# Patient Record
Sex: FEMALE | Race: WHITE | NOT HISPANIC OR LATINO | Employment: OTHER | URBAN - METROPOLITAN AREA
[De-identification: names, ages, dates, MRNs, and addresses within clinical notes are randomized per-mention and may not be internally consistent; named-entity substitution may affect disease eponyms.]

---

## 2017-03-10 ENCOUNTER — HOSPITAL ENCOUNTER (OUTPATIENT)
Dept: LAB | Facility: MEDICAL CENTER | Age: 72
End: 2017-03-10
Attending: SPECIALIST
Payer: MEDICARE

## 2017-03-10 LAB
ALBUMIN SERPL BCP-MCNC: 4 G/DL (ref 3.2–4.9)
ALBUMIN/GLOB SERPL: 1.2 G/DL
ALP SERPL-CCNC: 63 U/L (ref 30–99)
ALT SERPL-CCNC: 27 U/L (ref 2–50)
ANION GAP SERPL CALC-SCNC: 6 MMOL/L (ref 0–11.9)
AST SERPL-CCNC: 16 U/L (ref 12–45)
BASOPHILS # BLD AUTO: 0.07 K/UL (ref 0–0.12)
BASOPHILS NFR BLD AUTO: 0.8 % (ref 0–1.8)
BILIRUB SERPL-MCNC: 0.5 MG/DL (ref 0.1–1.5)
BUN SERPL-MCNC: 10 MG/DL (ref 8–22)
CALCIUM SERPL-MCNC: 9.9 MG/DL (ref 8.5–10.5)
CHLORIDE SERPL-SCNC: 107 MMOL/L (ref 96–112)
CO2 SERPL-SCNC: 25 MMOL/L (ref 20–33)
CREAT SERPL-MCNC: 0.82 MG/DL (ref 0.5–1.4)
EOSINOPHIL # BLD: 0.22 K/UL (ref 0–0.51)
EOSINOPHIL NFR BLD AUTO: 2.4 % (ref 0–6.9)
ERYTHROCYTE [DISTWIDTH] IN BLOOD BY AUTOMATED COUNT: 55 FL (ref 35.9–50)
GLOBULIN SER CALC-MCNC: 3.3 G/DL (ref 1.9–3.5)
GLUCOSE SERPL-MCNC: 116 MG/DL (ref 65–99)
HCT VFR BLD AUTO: 40.1 % (ref 37–47)
HGB BLD-MCNC: 13.3 G/DL (ref 12–16)
IMM GRANULOCYTES # BLD AUTO: 0.06 K/UL (ref 0–0.11)
IMM GRANULOCYTES NFR BLD AUTO: 0.6 % (ref 0–0.9)
LYMPHOCYTES # BLD: 3.13 K/UL (ref 1–4.8)
LYMPHOCYTES NFR BLD AUTO: 33.8 % (ref 22–41)
MCH RBC QN AUTO: 32.8 PG (ref 27–33)
MCHC RBC AUTO-ENTMCNC: 33.2 G/DL (ref 33.6–35)
MCV RBC AUTO: 99 FL (ref 81.4–97.8)
MONOCYTES # BLD: 0.66 K/UL (ref 0–0.85)
MONOCYTES NFR BLD AUTO: 7.1 % (ref 0–13.4)
NEUTROPHILS # BLD: 5.12 K/UL (ref 2–7.15)
NEUTROPHILS NFR BLD AUTO: 55.3 % (ref 44–72)
NRBC # BLD AUTO: 0 K/UL
NRBC BLD-RTO: 0 /100 WBC
PLATELET # BLD AUTO: 284 K/UL (ref 164–446)
PMV BLD AUTO: 9.9 FL (ref 9–12.9)
POTASSIUM SERPL-SCNC: 4.1 MMOL/L (ref 3.6–5.5)
PROT SERPL-MCNC: 7.3 G/DL (ref 6–8.2)
RBC # BLD AUTO: 4.05 M/UL (ref 4.2–5.4)
SODIUM SERPL-SCNC: 138 MMOL/L (ref 135–145)
WBC # BLD AUTO: 9.3 K/UL (ref 4.8–10.8)

## 2017-03-10 PROCEDURE — 36415 COLL VENOUS BLD VENIPUNCTURE: CPT

## 2017-03-10 PROCEDURE — 85025 COMPLETE CBC W/AUTO DIFF WBC: CPT

## 2017-03-10 PROCEDURE — 80053 COMPREHEN METABOLIC PANEL: CPT

## 2017-03-22 ENCOUNTER — OFFICE VISIT (OUTPATIENT)
Dept: URGENT CARE | Facility: PHYSICIAN GROUP | Age: 72
End: 2017-03-22
Payer: MEDICARE

## 2017-03-22 VITALS
WEIGHT: 210 LBS | HEART RATE: 98 BPM | BODY MASS INDEX: 35.85 KG/M2 | RESPIRATION RATE: 16 BRPM | HEIGHT: 64 IN | TEMPERATURE: 98.6 F | SYSTOLIC BLOOD PRESSURE: 140 MMHG | OXYGEN SATURATION: 93 % | DIASTOLIC BLOOD PRESSURE: 70 MMHG

## 2017-03-22 DIAGNOSIS — H69.92 EUSTACHIAN TUBE DYSFUNCTION, LEFT: ICD-10-CM

## 2017-03-22 PROCEDURE — 4040F PNEUMOC VAC/ADMIN/RCVD: CPT | Mod: 8P | Performed by: PHYSICIAN ASSISTANT

## 2017-03-22 PROCEDURE — G8419 CALC BMI OUT NRM PARAM NOF/U: HCPCS | Performed by: PHYSICIAN ASSISTANT

## 2017-03-22 PROCEDURE — 4004F PT TOBACCO SCREEN RCVD TLK: CPT | Performed by: PHYSICIAN ASSISTANT

## 2017-03-22 PROCEDURE — 3014F SCREEN MAMMO DOC REV: CPT | Mod: 8P | Performed by: PHYSICIAN ASSISTANT

## 2017-03-22 PROCEDURE — G8432 DEP SCR NOT DOC, RNG: HCPCS | Performed by: PHYSICIAN ASSISTANT

## 2017-03-22 PROCEDURE — G8484 FLU IMMUNIZE NO ADMIN: HCPCS | Performed by: PHYSICIAN ASSISTANT

## 2017-03-22 PROCEDURE — 1101F PT FALLS ASSESS-DOCD LE1/YR: CPT | Mod: 8P | Performed by: PHYSICIAN ASSISTANT

## 2017-03-22 PROCEDURE — 3017F COLORECTAL CA SCREEN DOC REV: CPT | Mod: 8P | Performed by: PHYSICIAN ASSISTANT

## 2017-03-22 PROCEDURE — 99214 OFFICE O/P EST MOD 30 MIN: CPT | Performed by: PHYSICIAN ASSISTANT

## 2017-03-22 RX ORDER — PREDNISONE 20 MG/1
TABLET ORAL
Qty: 10 TAB | Refills: 0 | Status: SHIPPED | OUTPATIENT
Start: 2017-03-22 | End: 2018-08-13

## 2017-03-22 ASSESSMENT — ENCOUNTER SYMPTOMS
RHINORRHEA: 0
COUGH: 0
NECK PAIN: 0
HEADACHES: 0
DIARRHEA: 0
SORE THROAT: 0

## 2017-03-22 NOTE — PROGRESS NOTES
"Subjective:      Angela Winchester is a 71 y.o. female who presents with Ear Pain            Otalgia   There is pain in the left ear. This is a new problem. The current episode started yesterday. The problem occurs constantly. The problem has been unchanged. There has been no fever. The fever has been present for less than 1 day. The pain is at a severity of 4/10. The pain is mild. Pertinent negatives include no coughing, diarrhea, ear discharge, headaches, hearing loss, neck pain, rash, rhinorrhea or sore throat.   had bronchitis and sinusitis in 11/2016. Denies ringing in ear. Feels \"full\".     Review of Systems   HENT: Positive for ear pain. Negative for ear discharge, hearing loss, rhinorrhea and sore throat.    Respiratory: Negative for cough.    Gastrointestinal: Negative for diarrhea.   Musculoskeletal: Negative for neck pain.   Skin: Negative for rash.   Neurological: Negative for headaches.          Objective:     /70 mmHg  Pulse 98  Temp(Src) 37 °C (98.6 °F)  Resp 16  Ht 1.626 m (5' 4.02\")  Wt 95.255 kg (210 lb)  BMI 36.03 kg/m2  SpO2 93%     Physical Exam   Constitutional: She appears well-developed.   HENT:   Head: Normocephalic.   Right Ear: External ear normal.   Left Ear: External ear normal. No drainage, swelling or tenderness. No foreign bodies. Tympanic membrane is not injected, not perforated, not erythematous and not bulging. Tympanic membrane mobility is abnormal.  No middle ear effusion. No decreased hearing is noted.   Eyes: EOM are normal. Pupils are equal, round, and reactive to light.   Neck: Normal range of motion.   Cardiovascular: Normal rate, regular rhythm and normal heart sounds.    Pulmonary/Chest: Effort normal and breath sounds normal. No respiratory distress. She has no wheezes. She has no rales.   Abdominal: Soft.   Skin: Skin is warm and dry.   Psychiatric: She has a normal mood and affect.               Assessment/Plan:     1. Eustachian tube dysfunction, " left  -take claritan-D daily  -use your fluticasone nasal spray once daily.  -no signs of otitis media. Discussed at length. Follow up as needed  - predniSONE (DELTASONE) 20 MG Tab; Take one pill twice a day for five days  Dispense: 10 Tab; Refill: 0

## 2017-03-22 NOTE — MR AVS SNAPSHOT
"        Angela Winchester   3/22/2017 8:00 AM   Office Visit   MRN: [id number]    Department:  [facility]   Dept Phone:  [phone]    Description:  Female : 1945   Provider:  Leslie Jones PA-C           Reason for Visit     Ear Pain C/o of left side ear and sinus pain, sinus headache, cough, reduced hearing in LT ear x1 day      Allergies as of 3/22/2017     No Known Allergies      You were diagnosed with     Eustachian tube dysfunction, left   [047680]         Vital Signs     Blood Pressure Pulse Temperature Respirations Height Weight    140/70 mmHg 98 37 °C (98.6 °F) 16 1.626 m (5' 4.02\") 95.255 kg (210 lb)    Body Mass Index Oxygen Saturation Smoking Status             36.03 kg/m2 93% Current Every Day Smoker         Basic Information     Date Of Birth Sex Race Ethnicity Preferred Language    1945 Female White Non- English      Health Maintenance        Date Due Completion Dates    IMM DTaP/Tdap/Td Vaccine (1 - Tdap) 1966 ---    PAP SMEAR 1968 ---    MAMMOGRAM 1987 ---    COLONOSCOPY 1997 ---    IMM ZOSTER VACCINE 2007 ---    IMM PNEUMOCOCCAL 65+ (ADULT) LOW/MEDIUM RISK SERIES (1 of 2 - PCV13) 2012 ---    BONE DENSITY 2015    IMM INFLUENZA (1) 2016 ---            Current Immunizations     No immunizations on file.      Below and/or attached are the medications your provider expects you to take. Review all of your home medications and newly ordered medications with your provider and/or pharmacist. Follow medication instructions as directed by your provider and/or pharmacist. Please keep your medication list with you and share with your provider. Update the information when medications are discontinued, doses are changed, or new medications (including over-the-counter products) are added; and carry medication information at all times in the event of emergency situations     Allergies:  No Known Allergies          Medications  Valid as " of: March 22, 2017 -  9:15 AM    Generic Name Brand Name Tablet Size Instructions for use    Adalimumab (Kit) HUMIRA 40 MG/0.8ML Inject 40 mg as instructed every 7 days.        Albuterol   Inhale 2 Puffs by mouth as needed.        ALPRAZolam (Tab) XANAX 0.5 MG Take 0.5 mg by mouth at bedtime as needed for Sleep.        AmLODIPine Besylate (Tab) NORVASC 5 MG Take 5 mg by mouth every day.        Amoxicillin (Cap) AMOXIL 500 MG Take 500 mg by mouth 3 times a day.        Atorvastatin Calcium (Tab) LIPITOR 20 MG Take 20 mg by mouth every evening.        Doxycycline Hyclate (Tab) VIBRAMYCIN 100 MG Take 1 Tab by mouth 2 times a day.        Doxycycline Hyclate (Tab) VIBRAMYCIN 100 MG Take 1 Tab by mouth 2 times a day.        Ergocalciferol (Cap) VITAMIN D2 46334 UNIT Take 50,000 Units by mouth every day.        Flunisolide (Solution) NASAREL 29 MCG/ACT (0.025%) Spray 2 Sprays in nose 2 Times a Day.        Folic Acid (Tab) FOLVITE 1 MG Take 1 mg by mouth every day.        GuaiFENesin   Take  by mouth.        Guaifenesin-Codeine (Solution) ROBITUSSIN -10 mg/5mL Take 5 mL by mouth every 6 hours as needed for Cough.        Hydrocodone-Acetaminophen (Tab) MAXIDONE  MG Take 1-2 Tabs by mouth every 6 hours as needed for Mild Pain.        Methotrexate Sodium (Tab) methotrexate 2.5 MG Take 2.5 mg by mouth every 7 days.        PredniSONE (Tab) DELTASONE 10 MG Take 10 mg by mouth every day. Take with food         PredniSONE (Tab) DELTASONE 20 MG Take one pill twice a day for five days        Tiotropium Bromide Monohydrate   Inhale 1 Tab by mouth every day.        Valsartan (Tab) DIOVAN 80 MG Take 320 mg by mouth every day.        .                 Medicines prescribed today were sent to:     [id number] [place] - [address] [facility]    [address] [place] [address]    Phone: [phone] Fax: [phone]    Open 24 Hours?: No      Medication refill instructions:       If your prescription bottle  indicates you have medication refills left, it is not necessary to call your provider’s office. Please contact your pharmacy and they will refill your medication.    If your prescription bottle indicates you do not have any refills left, you may request refills at any time through one of the following ways: The online Resilience system (except Urgent Care), by calling your provider’s office, or by asking your pharmacy to contact your provider’s office with a refill request. Medication refills are processed only during regular business hours and may not be available until the next business day. Your provider may request additional information or to have a follow-up visit with you prior to refilling your medication.   *Please Note: Medication refills are assigned a new Rx number when refilled electronically. Your pharmacy may indicate that no refills were authorized even though a new prescription for the same medication is available at the pharmacy. Please request the medicine by name with the pharmacy before contacting your provider for a refill.        Instructions    Take claritan-d daily as well as needed          Resilience Access Code: [email or web address]  Expires: 4/21/2017  9:15 AM    Resilience  A secure, online tool to manage your health information     [email or web address]’s Resilience® is a secure, online tool that connects you to your personalized health information from the privacy of your home -- day or night - making it very easy for you to manage your healthcare. Once the activation process is completed, you can even access your medical information using the Resilience daniela, which is available for free in the Apple Daniela store or Google Play store.     Resilience provides the following levels of access (as shown below):   My Chart Features   [facility] Doctor [facility]  Urgent  Care Non-[facility]  Primary Care  Doctor   Email your healthcare team securely and privately 24/7 X X X    Manage appointments:  schedule your next appointment; view details of past/upcoming appointments X      Request prescription refills. X      View recent personal medical records, including lab and immunizations X X X X   View health record, including health history, allergies, medications X X X X   Read reports about your outpatient visits, procedures, consult and ER notes X X X X   See your discharge summary, which is a recap of your hospital and/or ER visit that includes your diagnosis, lab results, and care plan. X X       How to register for [facility]:  1. Go to  [email or web address].  2. Click on the Sign Up Now box, which takes you to the New Member Sign Up page. You will need to provide the following information:  a. Enter your [facility] Access Code exactly as it appears at the top of this page. (You will not need to use this code after you’ve completed the sign-up process. If you do not sign up before the expiration date, you must request a new code.)   b. Enter your date of birth.   c. Enter your home email address.   d. Click Submit, and follow the next screen’s instructions.  3. Create a [facility]ara ID. This will be your [facility]ara login ID and cannot be changed, so think of one that is secure and easy to remember.  4. Create a [facility] password. You can change your password at any time.  5. Enter your Password Reset Question and Answer. This can be used at a later time if you forget your password.   6. Enter your e-mail address. This allows you to receive e-mail notifications when new information is available in [facility].  7. Click Sign Up. You can now view your health information.    For assistance activating your [facility] account, call [phone]        Quit Tobacco Information     Do you want to quit using tobacco?    Quitting tobacco decreases risks of cancer, heart and lung disease, increases life expectancy, improves sense of taste and smell, and increases spending money, among other benefits.    If you are thinking about  quitting, we can help.  • [facility] Quit Tobacco Program: 689-240-3562  o Program occurs weekly for four weeks and includes pharmacist consultation on products to support quitting smoking or chewing tobacco. A provider referral is needed for pharmacist consultation.  • Tobacco Users Help Hotline: 2-800-QUIT-NOW (742-9262) or https://[place].quitlogix.org/  o Free, confidential telephone and online coaching for [place] residents. Sessions are designed on a schedule that is convenient for you. Eligible clients receive free nicotine replacement therapy.  • Nationally: www.smokefree.gov  o Information and professional assistance to support both immediate and long-term needs as you become, and remain, a non-smoker. Smokefree.gov allows you to choose the help that best fits your needs.

## 2017-06-06 ENCOUNTER — HOSPITAL ENCOUNTER (OUTPATIENT)
Dept: LAB | Facility: MEDICAL CENTER | Age: 72
End: 2017-06-06
Attending: NURSE PRACTITIONER
Payer: MEDICARE

## 2017-06-06 LAB
ALBUMIN SERPL BCP-MCNC: 4 G/DL (ref 3.2–4.9)
ALBUMIN/GLOB SERPL: 1.1 G/DL
ALP SERPL-CCNC: 59 U/L (ref 30–99)
ALT SERPL-CCNC: 21 U/L (ref 2–50)
ANION GAP SERPL CALC-SCNC: 10 MMOL/L (ref 0–11.9)
AST SERPL-CCNC: 15 U/L (ref 12–45)
BASOPHILS # BLD AUTO: 0.8 % (ref 0–1.8)
BASOPHILS # BLD: 0.08 K/UL (ref 0–0.12)
BILIRUB SERPL-MCNC: 0.8 MG/DL (ref 0.1–1.5)
BUN SERPL-MCNC: 12 MG/DL (ref 8–22)
CALCIUM SERPL-MCNC: 9.5 MG/DL (ref 8.5–10.5)
CHLORIDE SERPL-SCNC: 100 MMOL/L (ref 96–112)
CO2 SERPL-SCNC: 24 MMOL/L (ref 20–33)
CREAT SERPL-MCNC: 0.83 MG/DL (ref 0.5–1.4)
EOSINOPHIL # BLD AUTO: 0.12 K/UL (ref 0–0.51)
EOSINOPHIL NFR BLD: 1.2 % (ref 0–6.9)
ERYTHROCYTE [DISTWIDTH] IN BLOOD BY AUTOMATED COUNT: 52.6 FL (ref 35.9–50)
GFR SERPL CREATININE-BSD FRML MDRD: >60 ML/MIN/1.73 M 2
GLOBULIN SER CALC-MCNC: 3.6 G/DL (ref 1.9–3.5)
GLUCOSE SERPL-MCNC: 131 MG/DL (ref 65–99)
HCT VFR BLD AUTO: 38.6 % (ref 37–47)
HGB BLD-MCNC: 13 G/DL (ref 12–16)
IMM GRANULOCYTES # BLD AUTO: 0.05 K/UL (ref 0–0.11)
IMM GRANULOCYTES NFR BLD AUTO: 0.5 % (ref 0–0.9)
LYMPHOCYTES # BLD AUTO: 1.61 K/UL (ref 1–4.8)
LYMPHOCYTES NFR BLD: 15.7 % (ref 22–41)
MCH RBC QN AUTO: 33.4 PG (ref 27–33)
MCHC RBC AUTO-ENTMCNC: 33.7 G/DL (ref 33.6–35)
MCV RBC AUTO: 99.2 FL (ref 81.4–97.8)
MONOCYTES # BLD AUTO: 0.64 K/UL (ref 0–0.85)
MONOCYTES NFR BLD AUTO: 6.2 % (ref 0–13.4)
NEUTROPHILS # BLD AUTO: 7.77 K/UL (ref 2–7.15)
NEUTROPHILS NFR BLD: 75.6 % (ref 44–72)
NRBC # BLD AUTO: 0 K/UL
NRBC BLD AUTO-RTO: 0 /100 WBC
PLATELET # BLD AUTO: 284 K/UL (ref 164–446)
PMV BLD AUTO: 9.9 FL (ref 9–12.9)
POTASSIUM SERPL-SCNC: 4 MMOL/L (ref 3.6–5.5)
PROT SERPL-MCNC: 7.6 G/DL (ref 6–8.2)
RBC # BLD AUTO: 3.89 M/UL (ref 4.2–5.4)
SODIUM SERPL-SCNC: 134 MMOL/L (ref 135–145)
WBC # BLD AUTO: 10.3 K/UL (ref 4.8–10.8)

## 2017-06-06 PROCEDURE — 85025 COMPLETE CBC W/AUTO DIFF WBC: CPT

## 2017-06-06 PROCEDURE — 36415 COLL VENOUS BLD VENIPUNCTURE: CPT

## 2017-06-06 PROCEDURE — 80053 COMPREHEN METABOLIC PANEL: CPT

## 2017-08-01 ENCOUNTER — APPOINTMENT (OUTPATIENT)
Dept: RADIOLOGY | Facility: IMAGING CENTER | Age: 72
End: 2017-08-01
Attending: PHYSICIAN ASSISTANT
Payer: MEDICARE

## 2017-08-01 ENCOUNTER — OFFICE VISIT (OUTPATIENT)
Dept: URGENT CARE | Facility: PHYSICIAN GROUP | Age: 72
End: 2017-08-01
Payer: MEDICARE

## 2017-08-01 VITALS
SYSTOLIC BLOOD PRESSURE: 124 MMHG | RESPIRATION RATE: 16 BRPM | BODY MASS INDEX: 35.85 KG/M2 | DIASTOLIC BLOOD PRESSURE: 84 MMHG | TEMPERATURE: 99.3 F | WEIGHT: 210 LBS | OXYGEN SATURATION: 94 % | HEART RATE: 85 BPM | HEIGHT: 64 IN

## 2017-08-01 DIAGNOSIS — Z72.0 TOBACCO ABUSE: ICD-10-CM

## 2017-08-01 DIAGNOSIS — M79.89 SWELLING OF RIGHT HAND: ICD-10-CM

## 2017-08-01 PROCEDURE — L3999 UPPER LIMB ORTHOSIS NOS: HCPCS | Performed by: PHYSICIAN ASSISTANT

## 2017-08-01 PROCEDURE — 73130 X-RAY EXAM OF HAND: CPT | Mod: TC,RT | Performed by: PHYSICIAN ASSISTANT

## 2017-08-01 PROCEDURE — 99214 OFFICE O/P EST MOD 30 MIN: CPT | Mod: 25 | Performed by: PHYSICIAN ASSISTANT

## 2017-08-01 PROCEDURE — 99406 BEHAV CHNG SMOKING 3-10 MIN: CPT | Performed by: PHYSICIAN ASSISTANT

## 2017-08-01 RX ORDER — NAPROXEN 375 MG/1
375 TABLET ORAL 2 TIMES DAILY WITH MEALS
Qty: 28 TAB | Refills: 0 | Status: SHIPPED | OUTPATIENT
Start: 2017-08-01 | End: 2017-08-15

## 2017-08-01 ASSESSMENT — ENCOUNTER SYMPTOMS
MYALGIAS: 0
VOMITING: 0
ABDOMINAL PAIN: 0
DIARRHEA: 0
NAUSEA: 0
SHORTNESS OF BREATH: 0
HEADACHES: 0
FEVER: 0
DIZZINESS: 0
CHILLS: 0

## 2017-08-01 NOTE — PATIENT INSTRUCTIONS
De Quervain Disease  De Quervain disease is inflammation of the tendon on the thumb side of the wrist. Tendons are cords of tissue that connect bones to muscles. The tendons in your hand pass through a tunnel, or sheath. A slippery layer of tissue (synovium) lets the tendons move smoothly in the sheath. With de Quervain disease, the sheath swells or thickens, causing friction and pain.  The condition is also called de Quervain tendinosis and de Quervain syndrome. It occurs most often in women who are 30-50 years old.  CAUSES   The exact cause of de Quervain disease is not known. It may result from:   · Overusing your hands, especially with repetitive motions that involve twisting your hand or using a forceful .  · Pregnancy.  · Rheumatoid disease.  RISK FACTORS  You may have a greater risk for de Quervain disease if you:  · Are a middle-aged woman.  · Are pregnant.  · Have rheumatoid arthritis.  · Have diabetes.  · Use your hands far more than normal, especially with a tight  or excessive twisting.  SIGNS AND SYMPTOMS  Pain on the thumb side of your wrist is the main symptom of de Quervain disease. Other signs and symptoms include:  · Pain that gets worse when you grasp something or turn your wrist.  · Pain that extends up the forearm.  · Cysts in the area of the pain.  · Swelling of your wrist and hand.  · A sensation of snapping in the wrist.  · Trouble moving the thumb and wrist.  DIAGNOSIS   Your health care provider may diagnose de Quervain disease based on your signs and symptoms. A physical exam will also be done. A simple test (Finkelstein test) that involves pulling your thumb and wrist to see if this causes pain can help determine whether you have the condition. Sometimes you may need to have an X-ray.   TREATMENT   Avoiding any activity that causes pain and swelling is the best treatment. Other options include:  · Wearing a splint.  · Taking medicine. Anti-inflammatory medicines and corticosteroid  injections may reduce inflammation and relieve pain.  · Having surgery if other treatments do not work.  HOME CARE INSTRUCTIONS   · Using ice can be helpful after doing activities that involve the sore wrist. To apply ice to the injured area:  ¨ Put ice in a plastic bag.  ¨ Place a towel between your skin and the bag.  ¨ Leave the ice on for 20 minutes, 2-3 times a day.  · Take medicines only as directed by your health care provider.  · Wear your splint as directed. This will allow your hand to rest and heal.  SEEK MEDICAL CARE IF:   · Your pain medicine does not help.    · Your pain gets worse.  · You develop new symptoms.  MAKE SURE YOU:   · Understand these instructions.  · Will watch your condition.  · Will get help right away if you are not doing well or get worse.     This information is not intended to replace advice given to you by your health care provider. Make sure you discuss any questions you have with your health care provider.     Document Released: 09/12/2002 Document Revised: 01/08/2016 Document Reviewed: 04/22/2015  Elsevier Interactive Patient Education ©2016 Elsevier Inc.

## 2017-08-01 NOTE — PROGRESS NOTES
"Subjective:      Angela Winchester is a 71 y.o. female who presents with Wrist Pain            HPI   Patient presents to urgent care reporting right wrist/hand pain x 3 days. She is right handed. She denies any known injury or trauma that provoked the pain. PMH includes rheumatoid arthritis, currently on methotrexate and humira with Norco  mg for break through pain. States he has taken the Norco for this problem without any relief. Pain is mostly localized over thumb and it radiates over dorsal aspect of hand. Reports slight swelling to the hand. She is concerned about a possible infection because she scratched her hand about a month ago. States the scratch was with a piece of metal and was very superficial. She cleaned it out afterwards and it never became red or swollen. She is not UTD on tetanus vaccinations, states they make her sick so she avoids them. Surgical history includes carpal tunnel release on right hand. Denies fevers, chills, nausea, vomiting, or body aches.     Review of Systems   Constitutional: Negative for fever and chills.   Respiratory: Negative for shortness of breath.    Cardiovascular: Negative for chest pain.   Gastrointestinal: Negative for nausea, vomiting, abdominal pain and diarrhea.   Genitourinary: Negative.    Musculoskeletal: Negative for myalgias.        Positive for hand pain   Neurological: Negative for dizziness and headaches.          Objective:     /84 mmHg  Pulse 85  Temp(Src) 37.4 °C (99.3 °F)  Resp 16  Ht 1.626 m (5' 4\")  Wt 95.255 kg (210 lb)  BMI 36.03 kg/m2  SpO2 94%     Physical Exam   Constitutional: She is oriented to person, place, and time. She appears well-developed and well-nourished. No distress.   HENT:   Head: Normocephalic and atraumatic.   Eyes: Pupils are equal, round, and reactive to light.   Neck: Normal range of motion.   Cardiovascular: Normal rate.    Pulmonary/Chest: Effort normal.   Musculoskeletal:        Right hand: She exhibits " decreased range of motion, tenderness and bony tenderness. Normal sensation noted. Decreased strength noted. She exhibits thumb/finger opposition and wrist extension trouble.        Hands:  Slight, diffuse swelling noted over right hand. No overlying erythema or warmth to touch. Patient especially tender over radial aspect of thumb. + Finkelstein test.    Neurological: She is alert and oriented to person, place, and time.   Skin: Skin is warm and dry. She is not diaphoretic.   Psychiatric: She has a normal mood and affect. Her behavior is normal.   Nursing note and vitals reviewed.         PMH:  has a past medical history of Arthritis; Hypertension; Sleep apnea; Snoring; Pain; Cancer (CMS-HCC) (2009); and RA (rheumatoid arthritis) (CMS-HCC).  MEDS:   Current outpatient prescriptions:   •  LOSARTAN POTASSIUM PO, Take  by mouth., Disp: , Rfl:   •  AMLODIPINE-ATORVASTATIN PO, Take  by mouth., Disp: , Rfl:   •  flunisolide, NASAL, (NASAREL) 29 MCG/ACT (0.025%) SOLN, Spray 2 Sprays in nose 2 Times a Day., Disp: 1 Bottle, Rfl: 0  •  amlodipine (NORVASC) 5 MG TABS, Take 5 mg by mouth every day., Disp: , Rfl:   •  Tiotropium Bromide Monohydrate (SPIRIVA HANDIHALER INH), Inhale 1 Tab by mouth every day., Disp: , Rfl:   •  alprazolam (XANAX) 0.5 MG TABS, Take 0.5 mg by mouth at bedtime as needed for Sleep., Disp: , Rfl:   •  atorvastatin (LIPITOR) 20 MG TABS, Take 20 mg by mouth every evening., Disp: , Rfl:   •  methotrexate (TREXALL) 2.5 MG TABS, Take 2.5 mg by mouth every 7 days., Disp: , Rfl:   •  folic acid (FOLVITE) 1 MG TABS, Take 1 mg by mouth every day., Disp: , Rfl:   •  hydrocodone-acetaminophen (MAXIDONE)  MG per tablet, Take 1-2 Tabs by mouth every 6 hours as needed for Mild Pain., Disp: , Rfl:   •  ergocalciferol (VITAMIN D2) 81184 UNIT CAPS, Take 50,000 Units by mouth every day., Disp: , Rfl:   •  adalimumab (HUMIRA) 40 MG/0.8ML injection, Inject 40 mg as instructed every 7 days., Disp: , Rfl:   •   GuaiFENesin (MUCINEX PO), Take  by mouth., Disp: , Rfl:   •  predniSONE (DELTASONE) 20 MG Tab, Take one pill twice a day for five days, Disp: 10 Tab, Rfl: 0  •  guaifenesin-codeine (CHERATUSSIN AC) Solution oral solution, Take 5 mL by mouth every 6 hours as needed for Cough., Disp: 90 mL, Rfl: 0  •  doxycycline (VIBRAMYCIN) 100 MG Tab, Take 1 Tab by mouth 2 times a day., Disp: 20 Tab, Rfl: 0  •  doxycycline (VIBRAMYCIN) 100 MG TABS, Take 1 Tab by mouth 2 times a day., Disp: 20 Tab, Rfl: 0  •  valsartan (DIOVAN) 80 MG TABS, Take 320 mg by mouth every day., Disp: , Rfl:   •  amoxicillin (AMOXIL) 500 MG CAPS, Take 500 mg by mouth 3 times a day., Disp: , Rfl:   •  predniSONE (DELTASONE) 10 MG TABS, Take 10 mg by mouth every day. Take with food , Disp: , Rfl:   •  Albuterol (VENTOLIN INH), Inhale 2 Puffs by mouth as needed., Disp: , Rfl:   ALLERGIES: No Known Allergies  SURGHX:   Past Surgical History   Procedure Laterality Date   • Thoracoscopy  6/8/2009     Performed by GANSER, JOHN H at SURGERY [facility] ORS   • Carpal tunnel endoscopic  2/5/2010     Performed by EREN KOVACS at SURGERY SAME DAY [facility] ORS   • Cataract phaco with iol  6/17/2013     Performed by Tyron Quiles M.D. at SURGERY [facility] ORS   • Cataract phaco with iol  7/1/2013     Performed by Tyron Quiles M.D. at SURGERY [facility]     SOCHX:  reports that she has been smoking Cigarettes.  She has a 10.5 pack-year smoking history. She does not have any smokeless tobacco history on file. She reports that she does not drink alcohol or use illicit drugs.  FH:      Assessment/Plan:     1. Swelling of right hand  - DX-HAND 3+ RIGHT; Future  Impression        Osteoarthritis affecting the interphalangeal joints with joint space narrowing, subchondral sclerosis and osteophyte formation.    No fracture or acute bony abnormality.         Informed patient of x-ray results. Advised her symptoms are consistent with DeQuervain's  tenosynovitis. Thumb spica splint given to patient in clinic. Short course of Naproxen 375 mg BID x 1-2 weeks. Handout given. Encouraged frequent icing. Patient has appointment with her PCP in 6 days and will follow up then.     2. Tobacco abuse    Tobacco cessation counseling was provided to the patient for a duration of three to seven minutes.  Tobacco effects, benefits of cessation and methods to use to achieve this goal were briefly discussed. An information handout was offered.

## 2017-08-01 NOTE — MR AVS SNAPSHOT
"        Angela Winchester   2017 8:45 AM   Office Visit   MRN: [id number]    Department:  [facility]   Dept Phone:  [phone]    Description:  Female : 1945   Provider:  Janet Rosado PA-C           Reason for Visit     Wrist Pain R. wrist and hand pain X 3 days Pt. cut their R. hand a month ago on metal and did not get a TDAP after.       Allergies as of 2017     No Known Allergies      You were diagnosed with     Swelling of right hand   [2094541]       Tobacco abuse   [[id number]]         Vital Signs     Blood Pressure Pulse Temperature Respirations Height Weight    124/84 mmHg 85 37.4 °C (99.3 °F) 16 1.626 m (5' 4\") 95.255 kg (210 lb)    Body Mass Index Oxygen Saturation Smoking Status             36.03 kg/m2 94% Current Every Day Smoker         Basic Information     Date Of Birth Sex Race Ethnicity Preferred Language    1945 Female White Non- English      Health Maintenance        Date Due Completion Dates    IMM DTaP/Tdap/Td Vaccine (1 - Tdap) 1966 ---    PAP SMEAR 1968 ---    MAMMOGRAM 1987 ---    COLONOSCOPY 1997 ---    IMM ZOSTER VACCINE 2007 ---    IMM PNEUMOCOCCAL 65+ (ADULT) LOW/MEDIUM RISK SERIES (1 of 2 - PCV13) 2012 ---    BONE DENSITY 2015    IMM INFLUENZA (1) 2017 ---            Current Immunizations     No immunizations on file.      Below and/or attached are the medications your provider expects you to take. Review all of your home medications and newly ordered medications with your provider and/or pharmacist. Follow medication instructions as directed by your provider and/or pharmacist. Please keep your medication list with you and share with your provider. Update the information when medications are discontinued, doses are changed, or new medications (including over-the-counter products) are added; and carry medication information at all times in the event of emergency situations     Allergies:  No Known " Allergies          Medications  Valid as of: August 01, 2017 - 11:01 AM    Generic Name Brand Name Tablet Size Instructions for use    Adalimumab (Kit) HUMIRA 40 MG/0.8ML Inject 40 mg as instructed every 7 days.        Albuterol   Inhale 2 Puffs by mouth as needed.        ALPRAZolam (Tab) XANAX 0.5 MG Take 0.5 mg by mouth at bedtime as needed for Sleep.        AmLODIPine Besylate (Tab) NORVASC 5 MG Take 5 mg by mouth every day.        Amlodipine-Atorvastatin   Take  by mouth.        Amoxicillin (Cap) AMOXIL 500 MG Take 500 mg by mouth 3 times a day.        Atorvastatin Calcium (Tab) LIPITOR 20 MG Take 20 mg by mouth every evening.        Doxycycline Hyclate (Tab) VIBRAMYCIN 100 MG Take 1 Tab by mouth 2 times a day.        Doxycycline Hyclate (Tab) VIBRAMYCIN 100 MG Take 1 Tab by mouth 2 times a day.        Ergocalciferol (Cap) VITAMIN D2 45763 UNIT Take 50,000 Units by mouth every day.        Flunisolide (Solution) NASAREL 29 MCG/ACT (0.025%) Spray 2 Sprays in nose 2 Times a Day.        Folic Acid (Tab) FOLVITE 1 MG Take 1 mg by mouth every day.        GuaiFENesin   Take  by mouth.        Guaifenesin-Codeine (Solution) ROBITUSSIN -10 mg/5mL Take 5 mL by mouth every 6 hours as needed for Cough.        Hydrocodone-Acetaminophen (Tab) MAXIDONE  MG Take 1-2 Tabs by mouth every 6 hours as needed for Mild Pain.        Losartan Potassium   Take  by mouth.        Methotrexate Sodium (Tab) methotrexate 2.5 MG Take 2.5 mg by mouth every 7 days.        Naproxen (Tab) NAPROSYN 375 MG Take 1 Tab by mouth 2 times a day, with meals for 14 days.        PredniSONE (Tab) DELTASONE 10 MG Take 10 mg by mouth every day. Take with food         PredniSONE (Tab) DELTASONE 20 MG Take one pill twice a day for five days        Tiotropium Bromide Monohydrate   Inhale 1 Tab by mouth every day.        Valsartan (Tab) DIOVAN 80 MG Take 320 mg by mouth every day.        .                 Medicines prescribed today were sent to:      [id number] - [place], [place] - [address] [facility]    [address] [facility] [place] [address]    Phone: [phone] Fax: [phone]    Open 24 Hours?: No      Medication refill instructions:       If your prescription bottle indicates you have medication refills left, it is not necessary to call your provider’s office. Please contact your pharmacy and they will refill your medication.    If your prescription bottle indicates you do not have any refills left, you may request refills at any time through one of the following ways: The online [employer] system (except Urgent Care), by calling your provider’s office, or by asking your pharmacy to contact your provider’s office with a refill request. Medication refills are processed only during regular business hours and may not be available until the next business day. Your provider may request additional information or to have a follow-up visit with you prior to refilling your medication.   *Please Note: Medication refills are assigned a new Rx number when refilled electronically. Your pharmacy may indicate that no refills were authorized even though a new prescription for the same medication is available at the pharmacy. Please request the medicine by name with the pharmacy before contacting your provider for a refill.        Your To Do List     Future Labs/Procedures Complete By Expires    DX-HAND 3+ RIGHT  As directed 8/1/2018      Instructions    De Quervain Disease  De Quervain disease is inflammation of the tendon on the thumb side of the wrist. Tendons are cords of tissue that connect bones to muscles. The tendons in your hand pass through a tunnel, or sheath. A slippery layer of tissue (synovium) lets the tendons move smoothly in the sheath. With de Quervain disease, the sheath swells or thickens, causing friction and pain.  The condition is also called de Quervain tendinosis and de Quervain syndrome. It occurs most often in women who are 30-50 years  old.  CAUSES   The exact cause of de Quervain disease is not known. It may result from:   · Overusing your hands, especially with repetitive motions that involve twisting your hand or using a forceful .  · Pregnancy.  · Rheumatoid disease.  RISK FACTORS  You may have a greater risk for de Quervain disease if you:  · Are a middle-aged woman.  · Are pregnant.  · Have rheumatoid arthritis.  · Have diabetes.  · Use your hands far more than normal, especially with a tight  or excessive twisting.  SIGNS AND SYMPTOMS  Pain on the thumb side of your wrist is the main symptom of de Quervain disease. Other signs and symptoms include:  · Pain that gets worse when you grasp something or turn your wrist.  · Pain that extends up the forearm.  · Cysts in the area of the pain.  · Swelling of your wrist and hand.  · A sensation of snapping in the wrist.  · Trouble moving the thumb and wrist.  DIAGNOSIS   Your health care provider may diagnose de Quervain disease based on your signs and symptoms. A physical exam will also be done. A simple test (Finkelstein test) that involves pulling your thumb and wrist to see if this causes pain can help determine whether you have the condition. Sometimes you may need to have an X-ray.   TREATMENT   Avoiding any activity that causes pain and swelling is the best treatment. Other options include:  · Wearing a splint.  · Taking medicine. Anti-inflammatory medicines and corticosteroid injections may reduce inflammation and relieve pain.  · Having surgery if other treatments do not work.  HOME CARE INSTRUCTIONS   · Using ice can be helpful after doing activities that involve the sore wrist. To apply ice to the injured area:  ¨ Put ice in a plastic bag.  ¨ Place a towel between your skin and the bag.  ¨ Leave the ice on for 20 minutes, 2-3 times a day.  · Take medicines only as directed by your health care provider.  · Wear your splint as directed. This will allow your hand to rest and  heal.  SEEK MEDICAL CARE IF:   · Your pain medicine does not help.    · Your pain gets worse.  · You develop new symptoms.  MAKE SURE YOU:   · Understand these instructions.  · Will watch your condition.  · Will get help right away if you are not doing well or get worse.     This information is not intended to replace advice given to you by your health care provider. Make sure you discuss any questions you have with your health care provider.     Document Released: 09/12/2002 Document Revised: 01/08/2016 Document Reviewed: 04/22/2015  [employer] Interactive Patient Education ©2016 [employer]vier Inc.            MedAware [email or web address] Access Code: Activation code not generated  Current MedAware [email or web address] Status: Active          Quit Tobacco Information     Do you want to quit using tobacco?    Quitting tobacco decreases risks of cancer, heart and lung disease, increases life expectancy, improves sense of taste and smell, and increases spending money, among other benefits.    If you are thinking about quitting, we can help.  • [facility] Quit Tobacco Program: 343-039-7897  o Program occurs weekly for four weeks and includes pharmacist consultation on products to support quitting smoking or chewing tobacco. A provider referral is needed for pharmacist consultation.  • Tobacco Users Help Hotline: 9-175-QUIT-NOW (053-8055) or https://[place].quitlogix.org/  o Free, confidential telephone and online coaching for [place] residents. Sessions are designed on a schedule that is convenient for you. Eligible clients receive free nicotine replacement therapy.  • Nationally: www.smokefree.gov  o Information and professional assistance to support both immediate and long-term needs as you become, and remain, a non-smoker. Smokefree.gov allows you to choose the help that best fits your needs.

## 2017-09-13 ENCOUNTER — HOSPITAL ENCOUNTER (OUTPATIENT)
Dept: LAB | Facility: MEDICAL CENTER | Age: 72
End: 2017-09-13
Attending: NURSE PRACTITIONER
Payer: MEDICARE

## 2017-09-13 LAB
ALBUMIN SERPL BCP-MCNC: 3.7 G/DL (ref 3.2–4.9)
ALBUMIN SERPL BCP-MCNC: 3.8 G/DL (ref 3.2–4.9)
ALBUMIN/GLOB SERPL: 1.1 G/DL
ALBUMIN/GLOB SERPL: 1.2 G/DL
ALP SERPL-CCNC: 53 U/L (ref 30–99)
ALP SERPL-CCNC: 53 U/L (ref 30–99)
ALT SERPL-CCNC: 18 U/L (ref 2–50)
ALT SERPL-CCNC: 19 U/L (ref 2–50)
ANION GAP SERPL CALC-SCNC: 6 MMOL/L (ref 0–11.9)
ANION GAP SERPL CALC-SCNC: 8 MMOL/L (ref 0–11.9)
AST SERPL-CCNC: 16 U/L (ref 12–45)
AST SERPL-CCNC: 16 U/L (ref 12–45)
BASOPHILS # BLD AUTO: 1.2 % (ref 0–1.8)
BASOPHILS # BLD: 0.08 K/UL (ref 0–0.12)
BILIRUB SERPL-MCNC: 1 MG/DL (ref 0.1–1.5)
BILIRUB SERPL-MCNC: 1 MG/DL (ref 0.1–1.5)
BUN SERPL-MCNC: 10 MG/DL (ref 8–22)
BUN SERPL-MCNC: 11 MG/DL (ref 8–22)
CALCIUM SERPL-MCNC: 8.8 MG/DL (ref 8.5–10.5)
CALCIUM SERPL-MCNC: 8.9 MG/DL (ref 8.5–10.5)
CHLORIDE SERPL-SCNC: 102 MMOL/L (ref 96–112)
CHLORIDE SERPL-SCNC: 103 MMOL/L (ref 96–112)
CHOLEST SERPL-MCNC: 125 MG/DL (ref 100–199)
CO2 SERPL-SCNC: 24 MMOL/L (ref 20–33)
CO2 SERPL-SCNC: 24 MMOL/L (ref 20–33)
CREAT SERPL-MCNC: 0.9 MG/DL (ref 0.5–1.4)
CREAT SERPL-MCNC: 0.93 MG/DL (ref 0.5–1.4)
EOSINOPHIL # BLD AUTO: 0.13 K/UL (ref 0–0.51)
EOSINOPHIL NFR BLD: 1.9 % (ref 0–6.9)
ERYTHROCYTE [DISTWIDTH] IN BLOOD BY AUTOMATED COUNT: 54.3 FL (ref 35.9–50)
GFR SERPL CREATININE-BSD FRML MDRD: 60 ML/MIN/1.73 M 2
GFR SERPL CREATININE-BSD FRML MDRD: >60 ML/MIN/1.73 M 2
GLOBULIN SER CALC-MCNC: 3.3 G/DL (ref 1.9–3.5)
GLOBULIN SER CALC-MCNC: 3.3 G/DL (ref 1.9–3.5)
GLUCOSE SERPL-MCNC: 138 MG/DL (ref 65–99)
GLUCOSE SERPL-MCNC: 138 MG/DL (ref 65–99)
HCT VFR BLD AUTO: 38.1 % (ref 37–47)
HDLC SERPL-MCNC: 28 MG/DL
HGB BLD-MCNC: 12.5 G/DL (ref 12–16)
IMM GRANULOCYTES # BLD AUTO: 0.07 K/UL (ref 0–0.11)
IMM GRANULOCYTES NFR BLD AUTO: 1 % (ref 0–0.9)
LDLC SERPL CALC-MCNC: 73 MG/DL
LYMPHOCYTES # BLD AUTO: 1.62 K/UL (ref 1–4.8)
LYMPHOCYTES NFR BLD: 24.3 % (ref 22–41)
MCH RBC QN AUTO: 33.2 PG (ref 27–33)
MCHC RBC AUTO-ENTMCNC: 32.8 G/DL (ref 33.6–35)
MCV RBC AUTO: 101.1 FL (ref 81.4–97.8)
MONOCYTES # BLD AUTO: 0.75 K/UL (ref 0–0.85)
MONOCYTES NFR BLD AUTO: 11.2 % (ref 0–13.4)
NEUTROPHILS # BLD AUTO: 4.02 K/UL (ref 2–7.15)
NEUTROPHILS NFR BLD: 60.4 % (ref 44–72)
NRBC # BLD AUTO: 0 K/UL
NRBC BLD AUTO-RTO: 0 /100 WBC
PLATELET # BLD AUTO: 270 K/UL (ref 164–446)
PMV BLD AUTO: 9.4 FL (ref 9–12.9)
POTASSIUM SERPL-SCNC: 4.1 MMOL/L (ref 3.6–5.5)
POTASSIUM SERPL-SCNC: 4.3 MMOL/L (ref 3.6–5.5)
PROT SERPL-MCNC: 7 G/DL (ref 6–8.2)
PROT SERPL-MCNC: 7.1 G/DL (ref 6–8.2)
RBC # BLD AUTO: 3.77 M/UL (ref 4.2–5.4)
SODIUM SERPL-SCNC: 133 MMOL/L (ref 135–145)
SODIUM SERPL-SCNC: 134 MMOL/L (ref 135–145)
TRIGL SERPL-MCNC: 121 MG/DL (ref 0–149)
WBC # BLD AUTO: 6.7 K/UL (ref 4.8–10.8)

## 2017-09-13 PROCEDURE — 80061 LIPID PANEL: CPT

## 2017-09-13 PROCEDURE — 80053 COMPREHEN METABOLIC PANEL: CPT

## 2017-09-13 PROCEDURE — 36415 COLL VENOUS BLD VENIPUNCTURE: CPT

## 2017-09-13 PROCEDURE — 80053 COMPREHEN METABOLIC PANEL: CPT | Mod: 91

## 2017-09-13 PROCEDURE — 85025 COMPLETE CBC W/AUTO DIFF WBC: CPT

## 2017-09-22 ENCOUNTER — OFFICE VISIT (OUTPATIENT)
Dept: URGENT CARE | Facility: PHYSICIAN GROUP | Age: 72
End: 2017-09-22
Payer: MEDICARE

## 2017-09-22 ENCOUNTER — APPOINTMENT (OUTPATIENT)
Dept: RADIOLOGY | Facility: IMAGING CENTER | Age: 72
End: 2017-09-22
Attending: NURSE PRACTITIONER
Payer: MEDICARE

## 2017-09-22 VITALS
HEART RATE: 86 BPM | OXYGEN SATURATION: 96 % | BODY MASS INDEX: 33.46 KG/M2 | WEIGHT: 196 LBS | TEMPERATURE: 99.4 F | RESPIRATION RATE: 24 BRPM | SYSTOLIC BLOOD PRESSURE: 126 MMHG | HEIGHT: 64 IN | DIASTOLIC BLOOD PRESSURE: 70 MMHG

## 2017-09-22 DIAGNOSIS — R06.02 SOB (SHORTNESS OF BREATH): ICD-10-CM

## 2017-09-22 DIAGNOSIS — J18.9 PNEUMONIA OF BOTH LOWER LOBES DUE TO INFECTIOUS ORGANISM: ICD-10-CM

## 2017-09-22 DIAGNOSIS — R05.8 PRODUCTIVE COUGH: ICD-10-CM

## 2017-09-22 DIAGNOSIS — R91.8 OPACITY OF LUNG ON IMAGING STUDY: ICD-10-CM

## 2017-09-22 PROCEDURE — 99214 OFFICE O/P EST MOD 30 MIN: CPT | Mod: 25 | Performed by: NURSE PRACTITIONER

## 2017-09-22 PROCEDURE — 71020 DX-CHEST-2 VIEWS: CPT | Mod: TC | Performed by: NURSE PRACTITIONER

## 2017-09-22 PROCEDURE — 94640 AIRWAY INHALATION TREATMENT: CPT | Performed by: NURSE PRACTITIONER

## 2017-09-22 RX ORDER — IPRATROPIUM BROMIDE AND ALBUTEROL SULFATE 2.5; .5 MG/3ML; MG/3ML
3 SOLUTION RESPIRATORY (INHALATION) ONCE
Status: COMPLETED | OUTPATIENT
Start: 2017-09-22 | End: 2017-09-22

## 2017-09-22 RX ORDER — DOXYCYCLINE HYCLATE 100 MG
100 TABLET ORAL 2 TIMES DAILY
Qty: 14 TAB | Refills: 0 | Status: SHIPPED | OUTPATIENT
Start: 2017-09-22 | End: 2017-09-29

## 2017-09-22 RX ORDER — METOPROLOL SUCCINATE 25 MG/1
25 TABLET, EXTENDED RELEASE ORAL DAILY
COMMUNITY

## 2017-09-22 RX ORDER — LEVOFLOXACIN 750 MG/1
750 TABLET, FILM COATED ORAL DAILY
COMMUNITY
End: 2018-02-06

## 2017-09-22 RX ADMIN — IPRATROPIUM BROMIDE AND ALBUTEROL SULFATE 3 ML: 2.5; .5 SOLUTION RESPIRATORY (INHALATION) at 11:52

## 2017-09-22 ASSESSMENT — ENCOUNTER SYMPTOMS
ORTHOPNEA: 0
MYALGIAS: 0
NAUSEA: 0
PALPITATIONS: 0
CONSTIPATION: 0
COUGH: 1
SPUTUM PRODUCTION: 1
WEAKNESS: 0
VOMITING: 0
FEVER: 0
HEADACHES: 0
CHILLS: 1
ABDOMINAL PAIN: 0
DIZZINESS: 0
SORE THROAT: 0
WHEEZING: 1
BACK PAIN: 0
DIARRHEA: 0
SHORTNESS OF BREATH: 1

## 2017-09-22 NOTE — PROGRESS NOTES
"Subjective:      Angela Winchester is a 72 y.o. female who presents with Cough (short of breath, wheezing, chest congestion X 2 weeks )            HPI  Angela is a 72 year old female who is here for cough x 2 weeks. C/o sob, chest tightness, wheeze, h/o COPD and smoker, taking spiriva once at night, ventolin- once daily prn but not regularly, states phlegm- thick white/brown/yellow. Nebulizer at home and states has albuterol- not using. Oxygen at home at night- CPAP. No fevers. Chills on and off. Prescribed Levaquin 750 mg x 5 day, states on 3rd day and \"not feeling any better\".     PMH:  has a past medical history of Arthritis; Cancer (CMS-HCC) (2009); Hypertension; Pain; RA (rheumatoid arthritis) (CMS-HCC); Sleep apnea; and Snoring.  MEDS:   Current Outpatient Prescriptions:   •  levofloxacin (LEVAQUIN) 750 MG tablet, Take 750 mg by mouth every day., Disp: , Rfl:   •  metoprolol SR (TOPROL XL) 25 MG TABLET SR 24 HR, Take 25 mg by mouth every day., Disp: , Rfl:   •  LOSARTAN POTASSIUM PO, Take  by mouth., Disp: , Rfl:   •  AMLODIPINE-ATORVASTATIN PO, Take  by mouth., Disp: , Rfl:   •  flunisolide, NASAL, (NASAREL) 29 MCG/ACT (0.025%) SOLN, Spray 2 Sprays in nose 2 Times a Day., Disp: 1 Bottle, Rfl: 0  •  Tiotropium Bromide Monohydrate (SPIRIVA HANDIHALER INH), Inhale 1 Tab by mouth every day., Disp: , Rfl:   •  alprazolam (XANAX) 0.5 MG TABS, Take 0.5 mg by mouth at bedtime as needed for Sleep., Disp: , Rfl:   •  atorvastatin (LIPITOR) 20 MG TABS, Take 20 mg by mouth every evening., Disp: , Rfl:   •  methotrexate (TREXALL) 2.5 MG TABS, Take 2.5 mg by mouth every 7 days., Disp: , Rfl:   •  hydrocodone-acetaminophen (MAXIDONE)  MG per tablet, Take 1-2 Tabs by mouth every 6 hours as needed for Mild Pain., Disp: , Rfl:   •  ergocalciferol (VITAMIN D2) 19076 UNIT CAPS, Take 50,000 Units by mouth every day., Disp: , Rfl:   •  GuaiFENesin (MUCINEX PO), Take  by mouth., Disp: , Rfl:   •  predniSONE (DELTASONE) 20 MG " Tab, Take one pill twice a day for five days, Disp: 10 Tab, Rfl: 0  •  guaifenesin-codeine (CHERATUSSIN AC) Solution oral solution, Take 5 mL by mouth every 6 hours as needed for Cough., Disp: 90 mL, Rfl: 0  •  doxycycline (VIBRAMYCIN) 100 MG Tab, Take 1 Tab by mouth 2 times a day., Disp: 20 Tab, Rfl: 0  •  doxycycline (VIBRAMYCIN) 100 MG TABS, Take 1 Tab by mouth 2 times a day., Disp: 20 Tab, Rfl: 0  •  valsartan (DIOVAN) 80 MG TABS, Take 320 mg by mouth every day., Disp: , Rfl:   •  amlodipine (NORVASC) 5 MG TABS, Take 5 mg by mouth every day., Disp: , Rfl:   •  amoxicillin (AMOXIL) 500 MG CAPS, Take 500 mg by mouth 3 times a day., Disp: , Rfl:   •  predniSONE (DELTASONE) 10 MG TABS, Take 10 mg by mouth every day. Take with food , Disp: , Rfl:   •  folic acid (FOLVITE) 1 MG TABS, Take 1 mg by mouth every day., Disp: , Rfl:   •  adalimumab (HUMIRA) 40 MG/0.8ML injection, Inject 40 mg as instructed every 7 days., Disp: , Rfl:   •  Albuterol (VENTOLIN INH), Inhale 2 Puffs by mouth as needed., Disp: , Rfl:   ALLERGIES: No Known Allergies  SURGHX:   Past Surgical History:   Procedure Laterality Date   • CATARACT PHACO WITH IOL  7/1/2013    Performed by Tyron Quiles M.D. at SURGERY [facility] ORS   • CATARACT PHACO WITH IOL  6/17/2013    Performed by Tyron Quiles M.D. at SUR[facility]ERY [facility] ORS   • CARPAL TUNNEL ENDOSCOPIC  2/5/2010    Performed by EREN KOVACS at SURGERY [facility] ORS   • THORACOSCOPY  6/8/2009    Performed by GANSER, JOHN H at [facility]ERY [facility] ORS     SOCHX:  reports that she has been smoking Cigarettes.  She has a 10.50 pack-year smoking history. She has never used smokeless tobacco. She reports that she does not drink alcohol or use drugs.  FH: Family history was reviewed, no pertinent findings to report    Review of Systems   Constitutional: Positive for chills. Negative for fever and malaise/fatigue.   HENT: Negative for congestion, ear pain and sore throat.   "  Respiratory: Positive for cough, sputum production, shortness of breath and wheezing.    Cardiovascular: Negative for chest pain, palpitations and orthopnea.   Gastrointestinal: Negative for abdominal pain, constipation, diarrhea, nausea and vomiting.   Musculoskeletal: Negative for back pain and myalgias.   Neurological: Negative for dizziness, weakness and headaches.   All other systems reviewed and are negative.         Objective:     /70   Pulse 86   Temp 37.4 °C (99.4 °F)   Resp (!) 24   Ht 1.626 m (5' 4\")   Wt 88.9 kg (196 lb)   SpO2 96%   BMI 33.64 kg/m²      Physical Exam   Constitutional: She is oriented to person, place, and time. Vital signs are normal. She appears well-developed and well-nourished. She is active and cooperative.  Non-toxic appearance. She does not have a sickly appearance. She does not appear ill. No distress.   HENT:   Head: Normocephalic.   Eyes: Conjunctivae and EOM are normal. Pupils are equal, round, and reactive to light.   Neck: Normal range of motion. Neck supple.   Cardiovascular: Normal rate and regular rhythm.    Pulmonary/Chest: Effort normal. No accessory muscle usage. No tachypnea. No respiratory distress. She has decreased breath sounds in the right lower field and the left lower field. She has no wheezes. She has no rhonchi. She has no rales.   Neurological: She is alert and oriented to person, place, and time.   Skin: Skin is warm and dry. She is not diaphoretic.   Psychiatric: Judgment and thought content normal. Her mood appears anxious. Her speech is rapid and/or pressured. She is agitated. She is not actively hallucinating. Cognition and memory are normal.   Patient appears agitated by waiting period, patient confrontational with when she will be better and was not interested in education about inhaler and nebulizer use to help her.  She is attentive.   Vitals reviewed.       Duo neb breathing treatment: Patient has chest tightness, sob without wheeze " or CP. Cough induced especially with talking. Air movement better throughout.     CXR FINDINGS:  The heart is normal in size.  Hyperexpanded lungs and apical blebs.  Mild lung base interstitial opacities. No consolidation.  No pleural effusions are appreciated.     Assessment/Plan:     1. SOB (shortness of breath)    - DX-CHEST-2 VIEWS; Future  - ipratropium-albuterol (DUONEB) nebulizer solution 3 mL; 3 mL by Nebulization route Once.    2. Productive cough    - ipratropium-albuterol (DUONEB) nebulizer solution 3 mL; 3 mL by Nebulization route Once.    3. Opacity of lung on imaging study    - doxycycline (VIBRAMYCIN) 100 MG Tab; Take 1 Tab by mouth 2 times a day for 7 days.  Dispense: 14 Tab; Refill: 0    4. Pneumonia of both lower lobes due to infectious organism    - doxycycline (VIBRAMYCIN) 100 MG Tab; Take 1 Tab by mouth 2 times a day for 7 days.  Dispense: 14 Tab; Refill: 0    Finish Levaquin, if no improvement, may start doxycycline  Increase water intake  May use Ibuprofen/Tylenol prn for fever or body aches  Get rest  May use saline nasal spray prn for any nasal congestion  Use inhalers and nebulizer prn for SOB, wheeze with cough  Monitor for fevers, productive cough, SOB, CP, chest tightness- need re-evaluation

## 2017-12-01 ENCOUNTER — HOSPITAL ENCOUNTER (OUTPATIENT)
Dept: LAB | Facility: MEDICAL CENTER | Age: 72
End: 2017-12-01
Attending: NURSE PRACTITIONER
Payer: MEDICARE

## 2017-12-01 ENCOUNTER — HOSPITAL ENCOUNTER (OUTPATIENT)
Dept: LAB | Facility: MEDICAL CENTER | Age: 72
End: 2017-12-01
Attending: SPECIALIST
Payer: MEDICARE

## 2017-12-01 LAB
ALBUMIN SERPL BCP-MCNC: 3.1 G/DL (ref 3.2–4.9)
ALBUMIN SERPL BCP-MCNC: 3.2 G/DL (ref 3.2–4.9)
ALBUMIN/GLOB SERPL: 0.8 G/DL
ALBUMIN/GLOB SERPL: 0.8 G/DL
ALP SERPL-CCNC: 71 U/L (ref 30–99)
ALP SERPL-CCNC: 74 U/L (ref 30–99)
ALT SERPL-CCNC: 23 U/L (ref 2–50)
ALT SERPL-CCNC: 24 U/L (ref 2–50)
ANION GAP SERPL CALC-SCNC: 10 MMOL/L (ref 0–11.9)
ANION GAP SERPL CALC-SCNC: 11 MMOL/L (ref 0–11.9)
AST SERPL-CCNC: 19 U/L (ref 12–45)
AST SERPL-CCNC: 19 U/L (ref 12–45)
BASOPHILS # BLD AUTO: 0.9 % (ref 0–1.8)
BASOPHILS # BLD: 0.14 K/UL (ref 0–0.12)
BILIRUB SERPL-MCNC: 0.2 MG/DL (ref 0.1–1.5)
BILIRUB SERPL-MCNC: 0.2 MG/DL (ref 0.1–1.5)
BUN SERPL-MCNC: 13 MG/DL (ref 8–22)
BUN SERPL-MCNC: 13 MG/DL (ref 8–22)
CALCIUM SERPL-MCNC: 8.8 MG/DL (ref 8.5–10.5)
CALCIUM SERPL-MCNC: 9.2 MG/DL (ref 8.5–10.5)
CHLORIDE SERPL-SCNC: 106 MMOL/L (ref 96–112)
CHLORIDE SERPL-SCNC: 107 MMOL/L (ref 96–112)
CHOLEST SERPL-MCNC: 97 MG/DL (ref 100–199)
CO2 SERPL-SCNC: 22 MMOL/L (ref 20–33)
CO2 SERPL-SCNC: 22 MMOL/L (ref 20–33)
CREAT SERPL-MCNC: 0.6 MG/DL (ref 0.5–1.4)
CREAT SERPL-MCNC: 0.63 MG/DL (ref 0.5–1.4)
EOSINOPHIL # BLD AUTO: 0.16 K/UL (ref 0–0.51)
EOSINOPHIL NFR BLD: 1.1 % (ref 0–6.9)
ERYTHROCYTE [DISTWIDTH] IN BLOOD BY AUTOMATED COUNT: 57.7 FL (ref 35.9–50)
GFR SERPL CREATININE-BSD FRML MDRD: >60 ML/MIN/1.73 M 2
GFR SERPL CREATININE-BSD FRML MDRD: >60 ML/MIN/1.73 M 2
GLOBULIN SER CALC-MCNC: 3.9 G/DL (ref 1.9–3.5)
GLOBULIN SER CALC-MCNC: 4 G/DL (ref 1.9–3.5)
GLUCOSE SERPL-MCNC: 100 MG/DL (ref 65–99)
GLUCOSE SERPL-MCNC: 99 MG/DL (ref 65–99)
HCT VFR BLD AUTO: 37.5 % (ref 37–47)
HDLC SERPL-MCNC: 28 MG/DL
HGB BLD-MCNC: 11.9 G/DL (ref 12–16)
IMM GRANULOCYTES # BLD AUTO: 0.63 K/UL (ref 0–0.11)
IMM GRANULOCYTES NFR BLD AUTO: 4.3 % (ref 0–0.9)
LDLC SERPL CALC-MCNC: 50 MG/DL
LYMPHOCYTES # BLD AUTO: 5.08 K/UL (ref 1–4.8)
LYMPHOCYTES NFR BLD: 34.3 % (ref 22–41)
MCH RBC QN AUTO: 32.1 PG (ref 27–33)
MCHC RBC AUTO-ENTMCNC: 31.7 G/DL (ref 33.6–35)
MCV RBC AUTO: 101.1 FL (ref 81.4–97.8)
MONOCYTES # BLD AUTO: 1.21 K/UL (ref 0–0.85)
MONOCYTES NFR BLD AUTO: 8.2 % (ref 0–13.4)
NEUTROPHILS # BLD AUTO: 7.6 K/UL (ref 2–7.15)
NEUTROPHILS NFR BLD: 51.2 % (ref 44–72)
NRBC # BLD AUTO: 0 K/UL
NRBC BLD AUTO-RTO: 0 /100 WBC
PLATELET # BLD AUTO: 526 K/UL (ref 164–446)
PMV BLD AUTO: 8.7 FL (ref 9–12.9)
POTASSIUM SERPL-SCNC: 3.6 MMOL/L (ref 3.6–5.5)
POTASSIUM SERPL-SCNC: 3.9 MMOL/L (ref 3.6–5.5)
PROT SERPL-MCNC: 7.1 G/DL (ref 6–8.2)
PROT SERPL-MCNC: 7.1 G/DL (ref 6–8.2)
RBC # BLD AUTO: 3.71 M/UL (ref 4.2–5.4)
SODIUM SERPL-SCNC: 138 MMOL/L (ref 135–145)
SODIUM SERPL-SCNC: 140 MMOL/L (ref 135–145)
TRIGL SERPL-MCNC: 97 MG/DL (ref 0–149)
WBC # BLD AUTO: 14.8 K/UL (ref 4.8–10.8)

## 2017-12-01 PROCEDURE — 80053 COMPREHEN METABOLIC PANEL: CPT

## 2017-12-01 PROCEDURE — 85025 COMPLETE CBC W/AUTO DIFF WBC: CPT

## 2017-12-01 PROCEDURE — 80061 LIPID PANEL: CPT

## 2017-12-01 PROCEDURE — 80053 COMPREHEN METABOLIC PANEL: CPT | Mod: 91

## 2017-12-01 PROCEDURE — 36415 COLL VENOUS BLD VENIPUNCTURE: CPT

## 2018-02-06 ENCOUNTER — OFFICE VISIT (OUTPATIENT)
Dept: URGENT CARE | Facility: PHYSICIAN GROUP | Age: 73
End: 2018-02-06
Payer: MEDICARE

## 2018-02-06 VITALS
HEART RATE: 71 BPM | SYSTOLIC BLOOD PRESSURE: 124 MMHG | OXYGEN SATURATION: 96 % | HEIGHT: 64 IN | TEMPERATURE: 98.6 F | DIASTOLIC BLOOD PRESSURE: 60 MMHG | WEIGHT: 203 LBS | BODY MASS INDEX: 34.66 KG/M2

## 2018-02-06 DIAGNOSIS — J01.10 ACUTE NON-RECURRENT FRONTAL SINUSITIS: Primary | ICD-10-CM

## 2018-02-06 PROCEDURE — 99214 OFFICE O/P EST MOD 30 MIN: CPT | Performed by: NURSE PRACTITIONER

## 2018-02-06 RX ORDER — CEFDINIR 300 MG/1
300 CAPSULE ORAL 2 TIMES DAILY
Qty: 20 CAP | Refills: 0 | Status: SHIPPED | OUTPATIENT
Start: 2018-02-06 | End: 2018-08-13

## 2018-02-06 ASSESSMENT — ENCOUNTER SYMPTOMS
SHORTNESS OF BREATH: 0
WEAKNESS: 1
SINUS PRESSURE: 1
VOMITING: 0
NAUSEA: 0
SORE THROAT: 1
CHILLS: 0
MYALGIAS: 0
COUGH: 1
FEVER: 0
DIARRHEA: 0
SPUTUM PRODUCTION: 1

## 2018-02-06 NOTE — PROGRESS NOTES
"Subjective:      Angela Winchester is a 72 y.o. female who presents with Sinus Problem (Sinus congestion, cough, chest feels tight and hurts in the morning x1 wk )            Medications, Allergies and Prior Medical Hx reviewed and [facility]with patient/family today         Sinus Problem   This is a new problem. The current episode started in the past 7 days. The problem has been gradually worsening since onset. There has been no fever. She is experiencing no pain. Associated symptoms include congestion, coughing, sinus pressure and a sore throat. Pertinent negatives include no chills, ear pain or shortness of breath. Past treatments include oral decongestants. The treatment provided mild relief.       Review of Systems   Constitutional: Positive for malaise/fatigue. Negative for chills and fever.   HENT: Positive for congestion, sinus pressure and sore throat. Negative for ear discharge and ear pain.    Respiratory: Positive for cough and sputum production. Negative for shortness of breath.    Gastrointestinal: Negative for diarrhea, nausea and vomiting.   Musculoskeletal: Negative for myalgias.   Neurological: Positive for weakness.          Objective:     /60   Pulse 71   Temp 37 °C (98.6 °F)   Ht 1.626 m (5' 4\")   Wt 92.1 kg (203 lb)   SpO2 96%   BMI 34.84 kg/m²      Physical Exam   Constitutional: She appears well-developed and well-nourished. No distress.   HENT:   Head: Normocephalic and atraumatic.   Right Ear: Tympanic membrane and ear canal normal.   Left Ear: Tympanic membrane and ear canal normal.   Nose: Rhinorrhea present. Right sinus exhibits maxillary sinus tenderness. Right sinus exhibits no frontal sinus tenderness. Left sinus exhibits maxillary sinus tenderness. Left sinus exhibits no frontal sinus tenderness.   Mouth/Throat: Uvula is midline and mucous membranes are normal. No trismus in the jaw. No uvula swelling. Posterior oropharyngeal edema and posterior oropharyngeal " erythema present. No oropharyngeal exudate.   Eyes: Conjunctivae are normal. Pupils are equal, round, and reactive to light.   Neck: Neck supple.   Cardiovascular: Normal rate, regular rhythm and normal heart sounds.    Pulmonary/Chest: Effort normal and breath sounds normal. No respiratory distress. She has no wheezes.   Lymphadenopathy:     She has cervical adenopathy.   Neurological: She is alert.   Skin: Skin is warm and dry. Capillary refill takes less than 2 seconds.   Psychiatric: She has a normal mood and affect. Her behavior is normal.   Vitals reviewed.              Assessment/Plan:     1. Acute non-recurrent frontal sinusitis  cefdinir (OMNICEF) 300 MG Cap         Rest, Fluids, tylenol, ibuprofen, sinus irrigation,  humidified air, and otc decongestants  Pt will go to the ER for worsening or changing symptoms as discussed,   Follow-up with your primary care provider or return here if not improving in 5 - 7 days   Discharge instructions discussed with pt/family who verbalize understanding and agreement with poc

## 2018-02-12 ENCOUNTER — OFFICE VISIT (OUTPATIENT)
Dept: URGENT CARE | Facility: PHYSICIAN GROUP | Age: 73
End: 2018-02-12
Payer: MEDICARE

## 2018-02-12 VITALS
BODY MASS INDEX: 35.2 KG/M2 | DIASTOLIC BLOOD PRESSURE: 60 MMHG | OXYGEN SATURATION: 92 % | HEART RATE: 80 BPM | HEIGHT: 64 IN | WEIGHT: 206.2 LBS | SYSTOLIC BLOOD PRESSURE: 118 MMHG | TEMPERATURE: 99.3 F

## 2018-02-12 DIAGNOSIS — J01.40 ACUTE PANSINUSITIS, RECURRENCE NOT SPECIFIED: ICD-10-CM

## 2018-02-12 PROCEDURE — 99214 OFFICE O/P EST MOD 30 MIN: CPT | Performed by: NURSE PRACTITIONER

## 2018-02-12 RX ORDER — AMOXICILLIN AND CLAVULANATE POTASSIUM 875; 125 MG/1; MG/1
1 TABLET, FILM COATED ORAL 2 TIMES DAILY
Qty: 14 TAB | Refills: 0 | Status: SHIPPED | OUTPATIENT
Start: 2018-02-12 | End: 2018-02-19

## 2018-02-12 RX ORDER — METHYLPREDNISOLONE 4 MG/1
TABLET ORAL
Qty: 1 KIT | Refills: 0 | Status: SHIPPED | OUTPATIENT
Start: 2018-02-12 | End: 2018-08-13

## 2018-02-12 RX ORDER — FLUTICASONE PROPIONATE 50 MCG
2 SPRAY, SUSPENSION (ML) NASAL DAILY
Qty: 1 BOTTLE | Refills: 0 | Status: SHIPPED | OUTPATIENT
Start: 2018-02-12 | End: 2018-08-13

## 2018-02-12 ASSESSMENT — ENCOUNTER SYMPTOMS
SINUS PAIN: 1
SORE THROAT: 0
NECK PAIN: 0
DIZZINESS: 0
WEAKNESS: 0
DIARRHEA: 0
VOMITING: 0
HEADACHES: 0
EYE REDNESS: 0
CHILLS: 0
SHORTNESS OF BREATH: 0
FEVER: 0
BACK PAIN: 0
CONSTIPATION: 0
NAUSEA: 0
EYE DISCHARGE: 0
ABDOMINAL PAIN: 0
COUGH: 0
WHEEZING: 0
MYALGIAS: 0

## 2018-02-12 NOTE — PROGRESS NOTES
Subjective:      Angela Winchester is a 72 y.o. female who presents with Sinus Problem (sinus pressure, trouble hearing after taking precribed meds, x10 days  )            HPI  Angela is here for sinus problems. Was seen in  6 days ago and placed on Cefdinir. States increased nasal congestion and ear pressure with decreased hearing. Flonase nasal spray at night- just started, no saline flushing. Denies fever, sore throat or cough. Smoker. Wants Augmentin.    PMH:  has a past medical history of Arthritis; Cancer (CMS-HCC) (2009); Hypertension; Pain; RA (rheumatoid arthritis) (CMS-HCC); Sleep apnea; and Snoring.  MEDS:   Current Outpatient Prescriptions:   •  fluticasone (FLONASE) 50 MCG/ACT nasal spray, Spray 2 Sprays in nose every day., Disp: 1 Bottle, Rfl: 0  •  MethylPREDNISolone (MEDROL DOSEPAK) 4 MG Tablet Therapy Pack, Use as directed, Disp: 1 Kit, Rfl: 0  •  amoxicillin-clavulanate (AUGMENTIN) 875-125 MG Tab, Take 1 Tab by mouth 2 times a day for 7 days., Disp: 14 Tab, Rfl: 0  •  cefdinir (OMNICEF) 300 MG Cap, Take 1 Cap by mouth 2 times a day., Disp: 20 Cap, Rfl: 0  •  metoprolol SR (TOPROL XL) 25 MG TABLET SR 24 HR, Take 25 mg by mouth every day., Disp: , Rfl:   •  LOSARTAN POTASSIUM PO, Take  by mouth., Disp: , Rfl:   •  AMLODIPINE-ATORVASTATIN PO, Take  by mouth., Disp: , Rfl:   •  GuaiFENesin (MUCINEX PO), Take  by mouth., Disp: , Rfl:   •  predniSONE (DELTASONE) 20 MG Tab, Take one pill twice a day for five days, Disp: 10 Tab, Rfl: 0  •  guaifenesin-codeine (CHERATUSSIN AC) Solution oral solution, Take 5 mL by mouth every 6 hours as needed for Cough., Disp: 90 mL, Rfl: 0  •  flunisolide, NASAL, (NASAREL) 29 MCG/ACT (0.025%) SOLN, Spray 2 Sprays in nose 2 Times a Day., Disp: 1 Bottle, Rfl: 0  •  valsartan (DIOVAN) 80 MG TABS, Take 320 mg by mouth every day., Disp: , Rfl:   •  amlodipine (NORVASC) 5 MG TABS, Take 5 mg by mouth every day., Disp: , Rfl:   •  predniSONE (DELTASONE) 10 MG TABS, Take 10 mg by  mouth every day. Take with food , Disp: , Rfl:   •  Tiotropium Bromide Monohydrate (SPIRIVA HANDIHALER INH), Inhale 1 Tab by mouth every day., Disp: , Rfl:   •  alprazolam (XANAX) 0.5 MG TABS, Take 0.5 mg by mouth at bedtime as needed for Sleep., Disp: , Rfl:   •  atorvastatin (LIPITOR) 20 MG TABS, Take 20 mg by mouth every evening., Disp: , Rfl:   •  methotrexate (TREXALL) 2.5 MG TABS, Take 2.5 mg by mouth every 7 days., Disp: , Rfl:   •  folic acid (FOLVITE) 1 MG TABS, Take 1 mg by mouth every day., Disp: , Rfl:   •  hydrocodone-acetaminophen (MAXIDONE)  MG per tablet, Take 1-2 Tabs by mouth every 6 hours as needed for Mild Pain., Disp: , Rfl:   •  ergocalciferol (VITAMIN D2) 08815 UNIT CAPS, Take 50,000 Units by mouth every day., Disp: , Rfl:   •  adalimumab (HUMIRA) 40 MG/0.8ML injection, Inject 40 mg as instructed every 7 days., Disp: , Rfl:   •  Albuterol (VENTOLIN INH), Inhale 2 Puffs by mouth as needed., Disp: , Rfl:   ALLERGIES: No Known Allergies  SURGHX:   Past Surgical History:   Procedure Laterality Date   • CATARACT PHACO WITH IOL  7/1/2013    Performed by Tyron Quiles M.D. at SURGERY SURGICAL [facility] ORS   • CATARACT PHACO WITH IOL  6/17/2013    Performed by Tyron Quiles M.D. at SURGERY SURGICAL [facility] ORS   • CARPAL TUNNEL ENDOSCOPIC  2/5/2010    Performed by EREN KOVACS at SURGERY SAME DAY [facility] ORS   • THORACOSCOPY  6/8/2009    Performed by GANSER, JOHN H at SURGERY [facility] ORS     SOCHX:  reports that she has been smoking Cigarettes.  She has a 10.50 pack-year smoking history. She has never used smokeless tobacco. She reports that she does not drink alcohol or use drugs.  FH: Family history was reviewed, no pertinent findings to report    Review of Systems   Constitutional: Positive for malaise/fatigue. Negative for chills and fever.   HENT: Positive for congestion, hearing loss and sinus pain. Negative for ear discharge, ear pain and sore throat.    Eyes: Negative for  "discharge and redness.   Respiratory: Negative for cough, shortness of breath and wheezing.    Gastrointestinal: Negative for abdominal pain, constipation, diarrhea, nausea and vomiting.   Musculoskeletal: Negative for back pain, myalgias and neck pain.   Neurological: Negative for dizziness, weakness and headaches.   All other systems reviewed and are negative.         Objective:     /60   Pulse 80   Temp 37.4 °C (99.3 °F)   Ht 1.626 m (5' 4\")   Wt 93.5 kg (206 lb 3.2 oz)   SpO2 92%   BMI 35.39 kg/m²      Physical Exam   Constitutional: She is oriented to person, place, and time. She appears well-developed and well-nourished. She is active and cooperative.  Non-toxic appearance. She does not have a sickly appearance. She appears ill. No distress.   HENT:   Head: Normocephalic.   Right Ear: External ear and ear canal normal. Tympanic membrane is injected and bulging.   Left Ear: External ear and ear canal normal. Tympanic membrane is injected.   Nose: Mucosal edema, rhinorrhea and sinus tenderness present. Right sinus exhibits maxillary sinus tenderness and frontal sinus tenderness. Left sinus exhibits maxillary sinus tenderness and frontal sinus tenderness.   Mouth/Throat: Uvula is midline. Mucous membranes are dry. No uvula swelling. No posterior oropharyngeal edema or posterior oropharyngeal erythema.   Eyes: Conjunctivae and EOM are normal. Pupils are equal, round, and reactive to light.   Neck: Normal range of motion. Neck supple.   Cardiovascular: Normal rate and regular rhythm.    Pulmonary/Chest: Effort normal and breath sounds normal. No accessory muscle usage. No respiratory distress. She has no decreased breath sounds. She has no wheezes. She has no rhonchi. She has no rales.   Musculoskeletal: Normal range of motion.   Lymphadenopathy:     She has no cervical adenopathy.   Neurological: She is alert and oriented to person, place, and time.   Skin: Skin is warm and dry. She is not diaphoretic. "   Vitals reviewed.              Assessment/Plan:     1. Acute pansinusitis, recurrence not specified    - fluticasone (FLONASE) 50 MCG/ACT nasal spray; Spray 2 Sprays in nose every day.  Dispense: 1 Bottle; Refill: 0  - MethylPREDNISolone (MEDROL DOSEPAK) 4 MG Tablet Therapy Pack; Use as directed  Dispense: 1 Kit; Refill: 0  - amoxicillin-clavulanate (AUGMENTIN) 875-125 MG Tab; Take 1 Tab by mouth 2 times a day for 7 days.  Dispense: 14 Tab; Refill: 0    D/c Cefdinir  Increase water intake  Get rest  May use Ibuprofen/Tylenol prn for any fever, body aches or throat pain  May take longer acting antihistamine for seasonal allergy symptoms prn  May use saline nasal spray for nasal congestion 4x/day  May use Flonase  for  nasal congestion  May gargle with salt water prn for throat discomfort  May drink smoothies for nutrition if too painful to swallow solid foods  Monitor for productive cough, SOB and chest pain/tightness- need re-evaluation

## 2018-03-13 ENCOUNTER — HOSPITAL ENCOUNTER (OUTPATIENT)
Dept: LAB | Facility: MEDICAL CENTER | Age: 73
End: 2018-03-13
Attending: SPECIALIST
Payer: MEDICARE

## 2018-03-13 LAB
ALBUMIN SERPL BCP-MCNC: 3.8 G/DL (ref 3.2–4.9)
ALBUMIN/GLOB SERPL: 0.9 G/DL
ALP SERPL-CCNC: 55 U/L (ref 30–99)
ALT SERPL-CCNC: 14 U/L (ref 2–50)
ANION GAP SERPL CALC-SCNC: 9 MMOL/L (ref 0–11.9)
AST SERPL-CCNC: 12 U/L (ref 12–45)
BASOPHILS # BLD AUTO: 1 % (ref 0–1.8)
BASOPHILS # BLD: 0.13 K/UL (ref 0–0.12)
BILIRUB SERPL-MCNC: 0.5 MG/DL (ref 0.1–1.5)
BUN SERPL-MCNC: 19 MG/DL (ref 8–22)
CALCIUM SERPL-MCNC: 9.9 MG/DL (ref 8.5–10.5)
CHLORIDE SERPL-SCNC: 102 MMOL/L (ref 96–112)
CO2 SERPL-SCNC: 22 MMOL/L (ref 20–33)
CREAT SERPL-MCNC: 0.95 MG/DL (ref 0.5–1.4)
EOSINOPHIL # BLD AUTO: 0.36 K/UL (ref 0–0.51)
EOSINOPHIL NFR BLD: 2.7 % (ref 0–6.9)
ERYTHROCYTE [DISTWIDTH] IN BLOOD BY AUTOMATED COUNT: 52.3 FL (ref 35.9–50)
GLOBULIN SER CALC-MCNC: 4.2 G/DL (ref 1.9–3.5)
GLUCOSE SERPL-MCNC: 112 MG/DL (ref 65–99)
HCT VFR BLD AUTO: 38.5 % (ref 37–47)
HGB BLD-MCNC: 12.6 G/DL (ref 12–16)
IMM GRANULOCYTES # BLD AUTO: 0.15 K/UL (ref 0–0.11)
IMM GRANULOCYTES NFR BLD AUTO: 1.1 % (ref 0–0.9)
LYMPHOCYTES # BLD AUTO: 3.31 K/UL (ref 1–4.8)
LYMPHOCYTES NFR BLD: 25 % (ref 22–41)
MCH RBC QN AUTO: 32.3 PG (ref 27–33)
MCHC RBC AUTO-ENTMCNC: 32.7 G/DL (ref 33.6–35)
MCV RBC AUTO: 98.7 FL (ref 81.4–97.8)
MONOCYTES # BLD AUTO: 1.85 K/UL (ref 0–0.85)
MONOCYTES NFR BLD AUTO: 14 % (ref 0–13.4)
NEUTROPHILS # BLD AUTO: 7.45 K/UL (ref 2–7.15)
NEUTROPHILS NFR BLD: 56.2 % (ref 44–72)
NRBC # BLD AUTO: 0 K/UL
NRBC BLD-RTO: 0 /100 WBC
PLATELET # BLD AUTO: 415 K/UL (ref 164–446)
PMV BLD AUTO: 9.3 FL (ref 9–12.9)
POTASSIUM SERPL-SCNC: 4.2 MMOL/L (ref 3.6–5.5)
PROT SERPL-MCNC: 8 G/DL (ref 6–8.2)
RBC # BLD AUTO: 3.9 M/UL (ref 4.2–5.4)
SODIUM SERPL-SCNC: 133 MMOL/L (ref 135–145)
WBC # BLD AUTO: 13.3 K/UL (ref 4.8–10.8)

## 2018-03-13 PROCEDURE — 80053 COMPREHEN METABOLIC PANEL: CPT

## 2018-03-13 PROCEDURE — 85025 COMPLETE CBC W/AUTO DIFF WBC: CPT

## 2018-03-13 PROCEDURE — 36415 COLL VENOUS BLD VENIPUNCTURE: CPT

## 2018-04-30 ENCOUNTER — HOSPITAL ENCOUNTER (OUTPATIENT)
Dept: LAB | Facility: MEDICAL CENTER | Age: 73
End: 2018-04-30
Attending: SPECIALIST
Payer: MEDICARE

## 2018-04-30 LAB
ALBUMIN SERPL BCP-MCNC: 3.9 G/DL (ref 3.2–4.9)
ALBUMIN/GLOB SERPL: 1 G/DL
ALP SERPL-CCNC: 50 U/L (ref 30–99)
ALT SERPL-CCNC: 17 U/L (ref 2–50)
ANION GAP SERPL CALC-SCNC: 12 MMOL/L (ref 0–11.9)
AST SERPL-CCNC: 12 U/L (ref 12–45)
BASOPHILS # BLD AUTO: 0.9 % (ref 0–1.8)
BASOPHILS # BLD: 0.12 K/UL (ref 0–0.12)
BILIRUB SERPL-MCNC: 0.3 MG/DL (ref 0.1–1.5)
BUN SERPL-MCNC: 18 MG/DL (ref 8–22)
CALCIUM SERPL-MCNC: 9.1 MG/DL (ref 8.5–10.5)
CHLORIDE SERPL-SCNC: 98 MMOL/L (ref 96–112)
CO2 SERPL-SCNC: 27 MMOL/L (ref 20–33)
CREAT SERPL-MCNC: 0.69 MG/DL (ref 0.5–1.4)
EOSINOPHIL # BLD AUTO: 0.15 K/UL (ref 0–0.51)
EOSINOPHIL NFR BLD: 1.1 % (ref 0–6.9)
ERYTHROCYTE [DISTWIDTH] IN BLOOD BY AUTOMATED COUNT: 52.3 FL (ref 35.9–50)
GLOBULIN SER CALC-MCNC: 4.1 G/DL (ref 1.9–3.5)
GLUCOSE SERPL-MCNC: 81 MG/DL (ref 65–99)
HCT VFR BLD AUTO: 39.5 % (ref 37–47)
HGB BLD-MCNC: 13.1 G/DL (ref 12–16)
IMM GRANULOCYTES # BLD AUTO: 0.14 K/UL (ref 0–0.11)
IMM GRANULOCYTES NFR BLD AUTO: 1.1 % (ref 0–0.9)
LYMPHOCYTES # BLD AUTO: 4.37 K/UL (ref 1–4.8)
LYMPHOCYTES NFR BLD: 33.1 % (ref 22–41)
MCH RBC QN AUTO: 32.3 PG (ref 27–33)
MCHC RBC AUTO-ENTMCNC: 33.2 G/DL (ref 33.6–35)
MCV RBC AUTO: 97.5 FL (ref 81.4–97.8)
MONOCYTES # BLD AUTO: 1.11 K/UL (ref 0–0.85)
MONOCYTES NFR BLD AUTO: 8.4 % (ref 0–13.4)
NEUTROPHILS # BLD AUTO: 7.32 K/UL (ref 2–7.15)
NEUTROPHILS NFR BLD: 55.4 % (ref 44–72)
NRBC # BLD AUTO: 0 K/UL
NRBC BLD-RTO: 0 /100 WBC
PLATELET # BLD AUTO: 452 K/UL (ref 164–446)
PMV BLD AUTO: 9.1 FL (ref 9–12.9)
POTASSIUM SERPL-SCNC: 3.8 MMOL/L (ref 3.6–5.5)
PROT SERPL-MCNC: 8 G/DL (ref 6–8.2)
RBC # BLD AUTO: 4.05 M/UL (ref 4.2–5.4)
SODIUM SERPL-SCNC: 137 MMOL/L (ref 135–145)
WBC # BLD AUTO: 13.2 K/UL (ref 4.8–10.8)

## 2018-04-30 PROCEDURE — 80053 COMPREHEN METABOLIC PANEL: CPT

## 2018-04-30 PROCEDURE — 36415 COLL VENOUS BLD VENIPUNCTURE: CPT

## 2018-04-30 PROCEDURE — 85025 COMPLETE CBC W/AUTO DIFF WBC: CPT

## 2018-06-08 ENCOUNTER — HOSPITAL ENCOUNTER (OUTPATIENT)
Dept: LAB | Facility: MEDICAL CENTER | Age: 73
End: 2018-06-08
Attending: SPECIALIST
Payer: MEDICARE

## 2018-06-08 LAB
ALBUMIN SERPL BCP-MCNC: 4 G/DL (ref 3.2–4.9)
ALBUMIN/GLOB SERPL: 1.2 G/DL
ALP SERPL-CCNC: 54 U/L (ref 30–99)
ALT SERPL-CCNC: 23 U/L (ref 2–50)
ANION GAP SERPL CALC-SCNC: 6 MMOL/L (ref 0–11.9)
AST SERPL-CCNC: 12 U/L (ref 12–45)
BASOPHILS # BLD AUTO: 0.8 % (ref 0–1.8)
BASOPHILS # BLD: 0.09 K/UL (ref 0–0.12)
BILIRUB SERPL-MCNC: 0.5 MG/DL (ref 0.1–1.5)
BUN SERPL-MCNC: 21 MG/DL (ref 8–22)
CALCIUM SERPL-MCNC: 9.8 MG/DL (ref 8.5–10.5)
CHLORIDE SERPL-SCNC: 106 MMOL/L (ref 96–112)
CO2 SERPL-SCNC: 26 MMOL/L (ref 20–33)
CREAT SERPL-MCNC: 0.82 MG/DL (ref 0.5–1.4)
EOSINOPHIL # BLD AUTO: 0.1 K/UL (ref 0–0.51)
EOSINOPHIL NFR BLD: 0.9 % (ref 0–6.9)
ERYTHROCYTE [DISTWIDTH] IN BLOOD BY AUTOMATED COUNT: 61.9 FL (ref 35.9–50)
GLOBULIN SER CALC-MCNC: 3.3 G/DL (ref 1.9–3.5)
GLUCOSE SERPL-MCNC: 106 MG/DL (ref 65–99)
HCT VFR BLD AUTO: 41.4 % (ref 37–47)
HGB BLD-MCNC: 13.4 G/DL (ref 12–16)
IMM GRANULOCYTES # BLD AUTO: 0.07 K/UL (ref 0–0.11)
IMM GRANULOCYTES NFR BLD AUTO: 0.6 % (ref 0–0.9)
LYMPHOCYTES # BLD AUTO: 2.55 K/UL (ref 1–4.8)
LYMPHOCYTES NFR BLD: 22.7 % (ref 22–41)
MCH RBC QN AUTO: 32.4 PG (ref 27–33)
MCHC RBC AUTO-ENTMCNC: 32.4 G/DL (ref 33.6–35)
MCV RBC AUTO: 100 FL (ref 81.4–97.8)
MONOCYTES # BLD AUTO: 0.89 K/UL (ref 0–0.85)
MONOCYTES NFR BLD AUTO: 7.9 % (ref 0–13.4)
NEUTROPHILS # BLD AUTO: 7.53 K/UL (ref 2–7.15)
NEUTROPHILS NFR BLD: 67.1 % (ref 44–72)
NRBC # BLD AUTO: 0 K/UL
NRBC BLD-RTO: 0 /100 WBC
PLATELET # BLD AUTO: 281 K/UL (ref 164–446)
PMV BLD AUTO: 9.6 FL (ref 9–12.9)
POTASSIUM SERPL-SCNC: 4.7 MMOL/L (ref 3.6–5.5)
PROT SERPL-MCNC: 7.3 G/DL (ref 6–8.2)
RBC # BLD AUTO: 4.14 M/UL (ref 4.2–5.4)
SODIUM SERPL-SCNC: 138 MMOL/L (ref 135–145)
WBC # BLD AUTO: 11.2 K/UL (ref 4.8–10.8)

## 2018-06-08 PROCEDURE — 85025 COMPLETE CBC W/AUTO DIFF WBC: CPT

## 2018-06-08 PROCEDURE — 80053 COMPREHEN METABOLIC PANEL: CPT

## 2018-06-08 PROCEDURE — 36415 COLL VENOUS BLD VENIPUNCTURE: CPT

## 2018-08-07 ENCOUNTER — APPOINTMENT (OUTPATIENT)
Dept: OTHER | Facility: IMAGING CENTER | Age: 73
End: 2018-08-07

## 2018-08-13 DIAGNOSIS — Z01.812 PRE-OPERATIVE LABORATORY EXAMINATION: ICD-10-CM

## 2018-08-13 DIAGNOSIS — Z01.810 PRE-OPERATIVE CARDIOVASCULAR EXAMINATION: ICD-10-CM

## 2018-08-13 LAB
ANION GAP SERPL CALC-SCNC: 12 MMOL/L (ref 0–11.9)
BUN SERPL-MCNC: 11 MG/DL (ref 8–22)
CALCIUM SERPL-MCNC: 9.3 MG/DL (ref 8.5–10.5)
CHLORIDE SERPL-SCNC: 100 MMOL/L (ref 96–112)
CO2 SERPL-SCNC: 23 MMOL/L (ref 20–33)
CREAT SERPL-MCNC: 0.8 MG/DL (ref 0.5–1.4)
EKG IMPRESSION: NORMAL
GLUCOSE SERPL-MCNC: 104 MG/DL (ref 65–99)
POTASSIUM SERPL-SCNC: 4.5 MMOL/L (ref 3.6–5.5)
SODIUM SERPL-SCNC: 135 MMOL/L (ref 135–145)

## 2018-08-13 PROCEDURE — 36415 COLL VENOUS BLD VENIPUNCTURE: CPT

## 2018-08-13 PROCEDURE — 87640 STAPH A DNA AMP PROBE: CPT | Mod: XU

## 2018-08-13 PROCEDURE — 81001 URINALYSIS AUTO W/SCOPE: CPT

## 2018-08-13 PROCEDURE — 85027 COMPLETE CBC AUTOMATED: CPT

## 2018-08-13 PROCEDURE — 93005 ELECTROCARDIOGRAM TRACING: CPT

## 2018-08-13 PROCEDURE — 80048 BASIC METABOLIC PNL TOTAL CA: CPT

## 2018-08-13 PROCEDURE — 87641 MR-STAPH DNA AMP PROBE: CPT

## 2018-08-13 PROCEDURE — 93010 ELECTROCARDIOGRAM REPORT: CPT | Performed by: INTERNAL MEDICINE

## 2018-08-13 RX ORDER — LOSARTAN POTASSIUM 100 MG/1
100 TABLET ORAL DAILY
COMMUNITY

## 2018-08-13 RX ORDER — LOSARTAN POTASSIUM 25 MG/1
25 TABLET ORAL DAILY
COMMUNITY
End: 2018-08-13

## 2018-08-13 NOTE — DISCHARGE PLANNING
DISCHARGE PLANNING NOTE - TOTAL JOINT    Procedure: Procedure(s):  KNEE ARTHROPLASTY TOTAL  TIBIAL OSTEOTOMY - POSS TUBERCLE  Procedure Date: 8/27/2018  Insurance:    Equipment currently available at home? Yes  Steps into the home? unknown  Steps within the home? unknown  Toilet height?   Type of shower?   Who will be with you during your recovery? spouse  Is Outpatient Physical Therapy set up after surgery? Yes  Did you take the Total Joint Class and where? Yes    Plan: Plans to be admitted possible 2 night stay

## 2018-08-13 NOTE — OR NURSING
"TOTAL JOINT REPLACEMENT SURGERY   PreAdmit Appointment  Pre-Operative Education Note       1) Did you take a Total Joint Replacement Pre-Operative Education class?  Where?  Answer: Yes; [facility]    2) If you did not take a class, did you receive pre-op education in the form of a book or through an online class?    Answer:     3) Have you had the same joint replacement procedure within the last 3 years?   Answer:    For patients who answered \"No\" to the above questions:     4) Was patient given information on [facility]'s Pre-Op Education class through the Pre-Op Education Class flyer or the Alternative Pre-op Education flyer?   Answer:     5) Was a [facility] Total Joint Replacement Patient Guide binder handed out?   Answer:    6) Did the patient refuse preoperative education?  Answer:    "

## 2018-08-13 NOTE — OR NURSING
"Preadmit appointment: \" Preparing for your Procedure information\" sheet given to patient with verbal and written instructions. Patient instructed to continue prescribed medications through the day before surgery, instructed to take the following medications the day of surgery per anesthesia protocol: Albuterol inhaler if needed, Amlodipine, Hydrocodone if needed, Metoprolol.  Pt instructed to bring rescue inhaler and CPAP day of surgery.  Pt given [facility] education sheet.  Pt had stopped her inhalers because she thought she needed to along with her vitamins and supplements and RA meds.  Pt was given specific instructions from her MD when to stop RA meds.  Instructed pt to start back on her inhalers today and to use as prescribed up to surgery.   "

## 2018-08-14 LAB
APPEARANCE UR: ABNORMAL
BACTERIA #/AREA URNS HPF: ABNORMAL /HPF
BILIRUB UR QL STRIP.AUTO: NEGATIVE
COLOR UR: ABNORMAL
EPI CELLS #/AREA URNS HPF: ABNORMAL /HPF
ERYTHROCYTE [DISTWIDTH] IN BLOOD BY AUTOMATED COUNT: 56.7 FL (ref 35.9–50)
GLUCOSE UR STRIP.AUTO-MCNC: NEGATIVE MG/DL
HCT VFR BLD AUTO: 37.8 % (ref 37–47)
HGB BLD-MCNC: 12.1 G/DL (ref 12–16)
HYALINE CASTS #/AREA URNS LPF: ABNORMAL /LPF
KETONES UR STRIP.AUTO-MCNC: NEGATIVE MG/DL
LEUKOCYTE ESTERASE UR QL STRIP.AUTO: ABNORMAL
MCH RBC QN AUTO: 31.5 PG (ref 27–33)
MCHC RBC AUTO-ENTMCNC: 32 G/DL (ref 33.6–35)
MCV RBC AUTO: 98.4 FL (ref 81.4–97.8)
MICRO URNS: ABNORMAL
NITRITE UR QL STRIP.AUTO: NEGATIVE
PH UR STRIP.AUTO: 6 [PH]
PLATELET # BLD AUTO: 416 K/UL (ref 164–446)
PMV BLD AUTO: 9.2 FL (ref 9–12.9)
PROT UR QL STRIP: 30 MG/DL
RBC # BLD AUTO: 3.84 M/UL (ref 4.2–5.4)
RBC # URNS HPF: ABNORMAL /HPF
RBC UR QL AUTO: NEGATIVE
SCCMEC + MECA PNL NOSE NAA+PROBE: NEGATIVE
SCCMEC + MECA PNL NOSE NAA+PROBE: NEGATIVE
SP GR UR STRIP.AUTO: 1.02
UROBILINOGEN UR STRIP.AUTO-MCNC: 0.2 MG/DL
WBC # BLD AUTO: 13.9 K/UL (ref 4.8–10.8)
WBC #/AREA URNS HPF: ABNORMAL /HPF

## 2018-08-27 ENCOUNTER — HOSPITAL ENCOUNTER (OUTPATIENT)
Facility: MEDICAL CENTER | Age: 73
End: 2018-08-28
Attending: ORTHOPAEDIC SURGERY | Admitting: ORTHOPAEDIC SURGERY
Payer: MEDICARE

## 2018-08-27 ENCOUNTER — APPOINTMENT (OUTPATIENT)
Dept: RADIOLOGY | Facility: MEDICAL CENTER | Age: 73
End: 2018-08-27
Attending: ORTHOPAEDIC SURGERY
Payer: MEDICARE

## 2018-08-27 PROCEDURE — 160041 HCHG SURGERY MINUTES - EA ADDL 1 MIN LEVEL 4: Performed by: ORTHOPAEDIC SURGERY

## 2018-08-27 PROCEDURE — 502000 HCHG MISC OR IMPLANTS RC 0278: Performed by: ORTHOPAEDIC SURGERY

## 2018-08-27 PROCEDURE — 160035 HCHG PACU - 1ST 60 MINS PHASE I: Performed by: ORTHOPAEDIC SURGERY

## 2018-08-27 PROCEDURE — 96365 THER/PROPH/DIAG IV INF INIT: CPT | Mod: XU

## 2018-08-27 PROCEDURE — 160009 HCHG ANES TIME/MIN: Performed by: ORTHOPAEDIC SURGERY

## 2018-08-27 PROCEDURE — 501838 HCHG SUTURE GENERAL: Performed by: ORTHOPAEDIC SURGERY

## 2018-08-27 PROCEDURE — 160029 HCHG SURGERY MINUTES - 1ST 30 MINS LEVEL 4: Performed by: ORTHOPAEDIC SURGERY

## 2018-08-27 PROCEDURE — 500367 HCHG DRAIN KIT, HEMOVAC: Performed by: ORTHOPAEDIC SURGERY

## 2018-08-27 PROCEDURE — 700101 HCHG RX REV CODE 250

## 2018-08-27 PROCEDURE — 73560 X-RAY EXAM OF KNEE 1 OR 2: CPT | Mod: RT

## 2018-08-27 PROCEDURE — A4314 CATH W/DRAINAGE 2-WAY LATEX: HCPCS | Performed by: ORTHOPAEDIC SURGERY

## 2018-08-27 PROCEDURE — L8699 PROSTHETIC IMPLANT NOS: HCPCS | Performed by: ORTHOPAEDIC SURGERY

## 2018-08-27 PROCEDURE — 160036 HCHG PACU - EA ADDL 30 MINS PHASE I: Performed by: ORTHOPAEDIC SURGERY

## 2018-08-27 PROCEDURE — 700111 HCHG RX REV CODE 636 W/ 250 OVERRIDE (IP): Performed by: ORTHOPAEDIC SURGERY

## 2018-08-27 PROCEDURE — A9270 NON-COVERED ITEM OR SERVICE: HCPCS | Performed by: ORTHOPAEDIC SURGERY

## 2018-08-27 PROCEDURE — 700105 HCHG RX REV CODE 258: Performed by: ORTHOPAEDIC SURGERY

## 2018-08-27 PROCEDURE — 700112 HCHG RX REV CODE 229: Performed by: ORTHOPAEDIC SURGERY

## 2018-08-27 PROCEDURE — 94760 N-INVAS EAR/PLS OXIMETRY 1: CPT

## 2018-08-27 PROCEDURE — 160048 HCHG OR STATISTICAL LEVEL 1-5: Performed by: ORTHOPAEDIC SURGERY

## 2018-08-27 PROCEDURE — 160002 HCHG RECOVERY MINUTES (STAT): Performed by: ORTHOPAEDIC SURGERY

## 2018-08-27 PROCEDURE — 502579 HCHG PACK, TOTAL KNEE: Performed by: ORTHOPAEDIC SURGERY

## 2018-08-27 PROCEDURE — 96376 TX/PRO/DX INJ SAME DRUG ADON: CPT | Mod: XU

## 2018-08-27 PROCEDURE — 502580 HCHG PACK, KNEE ARTHROSCOPY: Performed by: ORTHOPAEDIC SURGERY

## 2018-08-27 PROCEDURE — 700111 HCHG RX REV CODE 636 W/ 250 OVERRIDE (IP)

## 2018-08-27 PROCEDURE — 700102 HCHG RX REV CODE 250 W/ 637 OVERRIDE(OP): Performed by: ORTHOPAEDIC SURGERY

## 2018-08-27 PROCEDURE — G0378 HOSPITAL OBSERVATION PER HR: HCPCS

## 2018-08-27 PROCEDURE — 700102 HCHG RX REV CODE 250 W/ 637 OVERRIDE(OP)

## 2018-08-27 PROCEDURE — 96375 TX/PRO/DX INJ NEW DRUG ADDON: CPT | Mod: XU

## 2018-08-27 PROCEDURE — A9270 NON-COVERED ITEM OR SERVICE: HCPCS

## 2018-08-27 PROCEDURE — A6454 SELF-ADHER BAND W>=3" <5"/YD: HCPCS | Performed by: ORTHOPAEDIC SURGERY

## 2018-08-27 DEVICE — CEMENT ORTHOPEDIC HV US  (10/PK): Type: IMPLANTABLE DEVICE | Site: KNEE | Status: FUNCTIONAL

## 2018-08-27 DEVICE — IMPLANTABLE DEVICE: Type: IMPLANTABLE DEVICE | Site: KNEE | Status: FUNCTIONAL

## 2018-08-27 DEVICE — LGN PS HIGH FLEX XLPE SZ 3-4 10MM (1EA): Type: IMPLANTABLE DEVICE | Site: KNEE | Status: FUNCTIONAL

## 2018-08-27 DEVICE — PATELLA GNS II RESURF  29MM: Type: IMPLANTABLE DEVICE | Site: KNEE | Status: FUNCTIONAL

## 2018-08-27 DEVICE — IMPLANT GII CM TIB SIZE 3 RIGHT (1EA): Type: IMPLANTABLE DEVICE | Site: KNEE | Status: FUNCTIONAL

## 2018-08-27 RX ORDER — AMLODIPINE BESYLATE 5 MG/1
5 TABLET ORAL DAILY
Status: DISCONTINUED | OUTPATIENT
Start: 2018-08-28 | End: 2018-08-28 | Stop reason: HOSPADM

## 2018-08-27 RX ORDER — BUPIVACAINE HYDROCHLORIDE 2.5 MG/ML
INJECTION, SOLUTION EPIDURAL; INFILTRATION; INTRACAUDAL
Status: DISCONTINUED | OUTPATIENT
Start: 2018-08-27 | End: 2018-08-27 | Stop reason: HOSPADM

## 2018-08-27 RX ORDER — SODIUM CHLORIDE, SODIUM LACTATE, POTASSIUM CHLORIDE, CALCIUM CHLORIDE 600; 310; 30; 20 MG/100ML; MG/100ML; MG/100ML; MG/100ML
1000 INJECTION, SOLUTION INTRAVENOUS
Status: ACTIVE | OUTPATIENT
Start: 2018-08-27 | End: 2018-08-27

## 2018-08-27 RX ORDER — OXYCODONE HYDROCHLORIDE 10 MG/1
10 TABLET ORAL
Status: DISCONTINUED | OUTPATIENT
Start: 2018-08-27 | End: 2018-08-28 | Stop reason: HOSPADM

## 2018-08-27 RX ORDER — CELECOXIB 200 MG/1
200 CAPSULE ORAL 2 TIMES DAILY WITH MEALS
Status: DISCONTINUED | OUTPATIENT
Start: 2018-08-28 | End: 2018-08-28 | Stop reason: HOSPADM

## 2018-08-27 RX ORDER — POLYETHYLENE GLYCOL 3350 17 G/17G
1 POWDER, FOR SOLUTION ORAL 2 TIMES DAILY PRN
Status: DISCONTINUED | OUTPATIENT
Start: 2018-08-27 | End: 2018-08-28 | Stop reason: HOSPADM

## 2018-08-27 RX ORDER — HALOPERIDOL 5 MG/ML
1 INJECTION INTRAMUSCULAR EVERY 6 HOURS PRN
Status: DISCONTINUED | OUTPATIENT
Start: 2018-08-27 | End: 2018-08-28 | Stop reason: HOSPADM

## 2018-08-27 RX ORDER — ENEMA 19; 7 G/133ML; G/133ML
1 ENEMA RECTAL
Status: DISCONTINUED | OUTPATIENT
Start: 2018-08-27 | End: 2018-08-28 | Stop reason: HOSPADM

## 2018-08-27 RX ORDER — OXYCODONE HYDROCHLORIDE 5 MG/1
5 TABLET ORAL
Status: DISCONTINUED | OUTPATIENT
Start: 2018-08-27 | End: 2018-08-28 | Stop reason: HOSPADM

## 2018-08-27 RX ORDER — ATORVASTATIN CALCIUM 20 MG/1
20 TABLET, FILM COATED ORAL NIGHTLY
Status: DISCONTINUED | OUTPATIENT
Start: 2018-08-27 | End: 2018-08-28 | Stop reason: HOSPADM

## 2018-08-27 RX ORDER — DIPHENHYDRAMINE HYDROCHLORIDE 50 MG/ML
25 INJECTION INTRAMUSCULAR; INTRAVENOUS EVERY 6 HOURS PRN
Status: DISCONTINUED | OUTPATIENT
Start: 2018-08-27 | End: 2018-08-28 | Stop reason: HOSPADM

## 2018-08-27 RX ORDER — OXYCODONE HCL 5 MG/5 ML
SOLUTION, ORAL ORAL
Status: COMPLETED
Start: 2018-08-27 | End: 2018-08-27

## 2018-08-27 RX ORDER — BISACODYL 10 MG
10 SUPPOSITORY, RECTAL RECTAL
Status: DISCONTINUED | OUTPATIENT
Start: 2018-08-27 | End: 2018-08-28 | Stop reason: HOSPADM

## 2018-08-27 RX ORDER — AMOXICILLIN 250 MG
1 CAPSULE ORAL NIGHTLY
Status: DISCONTINUED | OUTPATIENT
Start: 2018-08-27 | End: 2018-08-28 | Stop reason: HOSPADM

## 2018-08-27 RX ORDER — ALPRAZOLAM 0.5 MG/1
0.5 TABLET ORAL NIGHTLY PRN
Status: DISCONTINUED | OUTPATIENT
Start: 2018-08-27 | End: 2018-08-28 | Stop reason: HOSPADM

## 2018-08-27 RX ORDER — SCOLOPAMINE TRANSDERMAL SYSTEM 1 MG/1
1 PATCH, EXTENDED RELEASE TRANSDERMAL
Status: DISCONTINUED | OUTPATIENT
Start: 2018-08-27 | End: 2018-08-28 | Stop reason: HOSPADM

## 2018-08-27 RX ORDER — DEXAMETHASONE SODIUM PHOSPHATE 4 MG/ML
4 INJECTION, SOLUTION INTRA-ARTICULAR; INTRALESIONAL; INTRAMUSCULAR; INTRAVENOUS; SOFT TISSUE
Status: DISCONTINUED | OUTPATIENT
Start: 2018-08-27 | End: 2018-08-28 | Stop reason: HOSPADM

## 2018-08-27 RX ORDER — DOCUSATE SODIUM 100 MG/1
100 CAPSULE, LIQUID FILLED ORAL 2 TIMES DAILY
Status: DISCONTINUED | OUTPATIENT
Start: 2018-08-27 | End: 2018-08-28 | Stop reason: HOSPADM

## 2018-08-27 RX ORDER — LOSARTAN POTASSIUM 25 MG/1
100 TABLET ORAL DAILY
Status: DISCONTINUED | OUTPATIENT
Start: 2018-08-27 | End: 2018-08-28 | Stop reason: HOSPADM

## 2018-08-27 RX ORDER — ONDANSETRON 2 MG/ML
4 INJECTION INTRAMUSCULAR; INTRAVENOUS EVERY 4 HOURS PRN
Status: DISCONTINUED | OUTPATIENT
Start: 2018-08-27 | End: 2018-08-28 | Stop reason: HOSPADM

## 2018-08-27 RX ORDER — FOLIC ACID 1 MG/1
1 TABLET ORAL DAILY
Status: DISCONTINUED | OUTPATIENT
Start: 2018-08-27 | End: 2018-08-28 | Stop reason: HOSPADM

## 2018-08-27 RX ORDER — KETOROLAC TROMETHAMINE 30 MG/ML
15 INJECTION, SOLUTION INTRAMUSCULAR; INTRAVENOUS EVERY 6 HOURS
Status: COMPLETED | OUTPATIENT
Start: 2018-08-27 | End: 2018-08-28

## 2018-08-27 RX ORDER — CEFAZOLIN SODIUM 1 G/3ML
INJECTION, POWDER, FOR SOLUTION INTRAMUSCULAR; INTRAVENOUS
Status: DISCONTINUED | OUTPATIENT
Start: 2018-08-27 | End: 2018-08-27 | Stop reason: HOSPADM

## 2018-08-27 RX ORDER — ACETAMINOPHEN 325 MG/1
650 TABLET ORAL EVERY 6 HOURS
Status: DISCONTINUED | OUTPATIENT
Start: 2018-08-27 | End: 2018-08-28 | Stop reason: HOSPADM

## 2018-08-27 RX ORDER — AMOXICILLIN 250 MG
1 CAPSULE ORAL
Status: DISCONTINUED | OUTPATIENT
Start: 2018-08-27 | End: 2018-08-28 | Stop reason: HOSPADM

## 2018-08-27 RX ORDER — HYDROMORPHONE HYDROCHLORIDE 2 MG/ML
0.5 INJECTION, SOLUTION INTRAMUSCULAR; INTRAVENOUS; SUBCUTANEOUS
Status: DISCONTINUED | OUTPATIENT
Start: 2018-08-27 | End: 2018-08-28 | Stop reason: HOSPADM

## 2018-08-27 RX ORDER — METOPROLOL SUCCINATE 25 MG/1
25 TABLET, EXTENDED RELEASE ORAL DAILY
Status: DISCONTINUED | OUTPATIENT
Start: 2018-08-28 | End: 2018-08-28 | Stop reason: HOSPADM

## 2018-08-27 RX ADMIN — KETOROLAC TROMETHAMINE 15 MG: 30 INJECTION, SOLUTION INTRAMUSCULAR at 12:55

## 2018-08-27 RX ADMIN — DOCUSATE SODIUM 100 MG: 100 CAPSULE, LIQUID FILLED ORAL at 18:04

## 2018-08-27 RX ADMIN — SODIUM CHLORIDE 2 G: 9 INJECTION, SOLUTION INTRAVENOUS at 16:05

## 2018-08-27 RX ADMIN — OXYCODONE HYDROCHLORIDE 5 MG: 5 SOLUTION ORAL at 10:47

## 2018-08-27 RX ADMIN — KETOROLAC TROMETHAMINE 15 MG: 30 INJECTION, SOLUTION INTRAMUSCULAR at 18:04

## 2018-08-27 RX ADMIN — OXYCODONE HYDROCHLORIDE 5 MG: 5 TABLET ORAL at 21:48

## 2018-08-27 RX ADMIN — ASPIRIN 325 MG: 325 TABLET, DELAYED RELEASE ORAL at 21:48

## 2018-08-27 RX ADMIN — ALPRAZOLAM 0.5 MG: 0.5 TABLET ORAL at 21:53

## 2018-08-27 RX ADMIN — ATORVASTATIN CALCIUM 20 MG: 20 TABLET, FILM COATED ORAL at 21:48

## 2018-08-27 RX ADMIN — DOCUSATE SODIUM 100 MG: 100 CAPSULE, LIQUID FILLED ORAL at 12:54

## 2018-08-27 RX ADMIN — DOCUSATE SODIUM AND SENNOSIDES 1 TABLET: 8.6; 5 TABLET, FILM COATED ORAL at 21:48

## 2018-08-27 RX ADMIN — OXYCODONE HYDROCHLORIDE 5 MG: 5 TABLET ORAL at 18:07

## 2018-08-27 RX ADMIN — ACETAMINOPHEN 650 MG: 325 TABLET, FILM COATED ORAL at 12:54

## 2018-08-27 RX ADMIN — ACETAMINOPHEN 650 MG: 325 TABLET, FILM COATED ORAL at 18:04

## 2018-08-27 ASSESSMENT — PATIENT HEALTH QUESTIONNAIRE - PHQ9
6. FEELING BAD ABOUT YOURSELF - OR THAT YOU ARE A FAILURE OR HAVE LET YOURSELF OR YOUR FAMILY DOWN: NOT AL ALL
4. FEELING TIRED OR HAVING LITTLE ENERGY: NOT AT ALL
1. LITTLE INTEREST OR PLEASURE IN DOING THINGS: SEVERAL DAYS
7. TROUBLE CONCENTRATING ON THINGS, SUCH AS READING THE NEWSPAPER OR WATCHING TELEVISION: NOT AT ALL
8. MOVING OR SPEAKING SO SLOWLY THAT OTHER PEOPLE COULD HAVE NOTICED. OR THE OPPOSITE, BEING SO FIGETY OR RESTLESS THAT YOU HAVE BEEN MOVING AROUND A LOT MORE THAN USUAL: NOT AT ALL
SUM OF ALL RESPONSES TO PHQ QUESTIONS 1-9: 2
5. POOR APPETITE OR OVEREATING: NOT AT ALL
9. THOUGHTS THAT YOU WOULD BE BETTER OFF DEAD, OR OF HURTING YOURSELF: NOT AT ALL
SUM OF ALL RESPONSES TO PHQ9 QUESTIONS 1 AND 2: 2
2. FEELING DOWN, DEPRESSED, IRRITABLE, OR HOPELESS: SEVERAL DAYS
3. TROUBLE FALLING OR STAYING ASLEEP OR SLEEPING TOO MUCH: NOT AT ALL

## 2018-08-27 ASSESSMENT — COGNITIVE AND FUNCTIONAL STATUS - GENERAL
STANDING UP FROM CHAIR USING ARMS: A LITTLE
WALKING IN HOSPITAL ROOM: A LITTLE
CLIMB 3 TO 5 STEPS WITH RAILING: A LITTLE
MOBILITY SCORE: 21
SUGGESTED CMS G CODE MODIFIER MOBILITY: CJ

## 2018-08-27 ASSESSMENT — PAIN SCALES - GENERAL
PAINLEVEL_OUTOF10: 0
PAINLEVEL_OUTOF10: 2
PAINLEVEL_OUTOF10: 2
PAINLEVEL_OUTOF10: 0
PAINLEVEL_OUTOF10: 3
PAINLEVEL_OUTOF10: 4
PAINLEVEL_OUTOF10: 2

## 2018-08-27 ASSESSMENT — LIFESTYLE VARIABLES: ALCOHOL_USE: NO

## 2018-08-27 NOTE — OR SURGEON
Immediate Post OP Note    PreOp Diagnosis: R knee arthritis    PostOp Diagnosis: same    Procedure(s):  KNEE ARTHROPLASTY TOTAL - Wound Class: Clean      Surgeon(s):  Fabian Kennedy M.D.    Anesthesiologist/Type of Anesthesia:  Anesthesiologist: Jace Gomez D.O./General    Surgical Staff:  Assistant: Candie Davila R.N.  Circulator: Rebecca Ji R.N.  Limb Castillo: Gaudencio Massey Circulator: Ale Child R.N.  Scrub Person: Amy Grossman    Specimens removed if any:  * No specimens in log *    Estimated Blood Loss: 100 cc    Findings: above    Complications: none        8/27/2018 10:08 AM Fabian Kennedy M.D.

## 2018-08-27 NOTE — OP REPORT
DATE OF SERVICE:  08/27/2018    PREOPERATIVE DIAGNOSES:  Right knee rheumatoid arthritis with severe secondary   degenerative joint disease and patellar tendon calcific tendonitis.    POSTOPERATIVE DIAGNOSES:  Right knee rheumatoid arthritis with severe   secondary degenerative joint disease and patellar tendon calcific tendonitis.    PROCEDURE:  Right total knee arthroplasty.    SURGEON:  Fabian Kennedy MD    ASSISTANT:  YAZMIN Toscano    ANESTHESIA:  General through gentle block.    BLOOD LOSS:  100 mL.    TOURNIQUET:  Right thigh 86 minutes at 250 mmHg.    INDICATION:  The patient is a 72-year-old woman with a history of rheumatoid   arthritis and severe right greater than left knee secondary DJD.  She also has   radiographic evidence of extensive patellar tendon calcific tendinitis.  She   is indicated for total knee arthroplasty with possibility for need for an   extensile approach.    PROSTHESIS:  [employer] Mar[employer]a femur size 4 PS cemented, tibia size 3   cemented, tibial insert 10 mm PS, patella 29 mm onset cemented.    FINDINGS:  Exam under anesthesia revealed range of motion from  degrees.    Ligamentous exam is grossly stable.  There is some limited patellar   mobility.  Open arthrotomy revealed extensive rheumatoid pannus and synovitis.    There was extensive calcification of the patellar tendon with stiffening and   friability of the tendon that limited patellar mobility.  There was full   thickness erosion of the articular cartilage of all compartments.    DESCRIPTION OF PROCEDURE:  Following induction of general anesthesia, time-out   was performed confirming patient, site, and procedure.  Two grams of Ancef IV   were ordered and administered.  A gram of tranexamic acid was administered.    The Gan catheter was placed.  The right thigh tourniquet was applied.  The   right lower extremity was prepped and draped in sterile fashion.  The   extremity was exsanguinated and the  tourniquet was inflated.  A standard   anterior midline longitudinal incision was made and dissection was carried   down sharply through subcutaneous tissues.  A medial parapatellar arthrotomy   was performed in the standard fashion with a standard medial release.  The   posterior half of the fat pad was excised and the lateral patellofemoral   ligaments were incised.  At this point, the patellar tendon was noted to be   very calcified with stiffness and friability.  The patella could not be   everted.  A quadriceps snip was then performed, which increased the mobility   of the patella.  It could still not be safely everted, but it could be   laterally displaced, adequately flex and visualize the joint without any undue   tension on the tendon.  The ACL was absent, the PCL was present, but   attenuated.  Based on the preoperative flexion contracture and severe   attenuation of the tissues with rheumatoid arthritis, it was felt that it was   appropriate to sacrifice the posterior cruciate ligament and substituted.  The   remnants of the medial and lateral menisci were excised.  The drill was used   to enter the tibial and femoral canals.  The femoral IM janes with 5-degree   valgus module was placed and proper alignment was checked, it was pinned.    Sizing was done and the preliminary anterior cut was made.  Next, the   preliminary distal cut was made taking an extra 2 mm because of the flexion   contracture.  Next, the cutting block for the PS box was placed in the proper   position and reaming and chiseling was done to remove the box, the chamfers   were then cut.  Next, with proper retraction, the proximal tibial cut was   made.  The IM alignment janes with cutting block was placed and the proper   rotation was placed in the primary position off the less deficient medial   plateau.  An extramedullary check was done for alignment and the block was   pinned with proper retraction and protection of the patellar tendon  and the   proximal tibial cut was made.  Remaining osteophytes were removed posteriorly.    Care was taken throughout the procedure to not apply too much tension to the   patellar tendon.  Flexion and extension gaps were checked and found to be   appropriate with good alignment.  The tibia was then sized and punched for the   trial.  The tibial trial was placed followed by the femoral trial.  The 9 mm   insert appeared to be appropriate with good alignment and good ligamentous   balance.  Alignment of the tibia was marked.    Next, the patella was cut free cut, taking care not to put too much tension on   the patellar tendon.  It was measured with the caliper and freehand cut was   made.  It was sized and drilled for the lugs.  Patellar trialing was done and   it tracked well with no thumbs test.  The tibia was then punched for the keel   and the trials were removed.  The knee was thoroughly irrigated.    Next cementing was done.  The bony surfaces were thoroughly irrigated and   dried.  Cement in the doughy phase was pressurized onto the proximal tibia and   the tibial component was impacted into place.  Excess cement was removed.    Next, the femoral component was likewise cemented and excess cement was   removed.  The 11 mm trial insert was placed and the knee was held in   extension.  The patella was then cemented and held with a clamp.  Excess   cement was removed and the cement was allowed to cure.  Next, the knee was   thoroughly irrigated and trialing was done.  It was felt that the 10 mm insert   would be appropriate.  The tourniquet was deflated.  Posterior capsule was   injected with 30 mL of 0.25% plain Marcaine.  Hemostasis was achieved with   electrocautery.  The knee was again irrigated and the 10 mm PS tibial insert   was implanted.  This allowed for full extension and good flexion with good   ligamentous balance.  The patella tracked well with no thumbs test.  A deep   Hemovac drain was placed.   Care was taken to carefully repair the quadriceps   snip and the medial parapatellar arthrotomy with #1 Vicryl figure-of-eight   sutures.  This repair was stable to beyond 120 degrees of flexion with   gravity.  Skin was closed in layers with 2-0 Vicryl subcutaneous and staples   on skin.  Sterile dressing was applied followed by JAY hose and knee   immobilizer.  Another gram of tranexamic acid was given.  Patient was awakened   and extubated in the operating room and transferred to recovery room in   stable condition.       ____________________________________     MD KAVIN Ortiz / NINA    DD:  08/27/2018 10:01:55  DT:  08/27/2018 10:46:10    D#:  [id number]  Job#:  [id number]

## 2018-08-27 NOTE — OR NURSING
1001- Patient arrived from OR with oral airway in place. Respirations even and unlabored. Surgical dressing to R knee. C/D/I. Pulse 2+ throughout. Nail beds pink with brisk cap refill. Skin warm.  1020- Patient waking up. Oral airway removed. CPAP unavailable due to family taking all belongings in preop. Patient denies any pain or nausea.   1033- Xray completed at bedside.   1046- Discussion with patient regarding pain management. Patient reporting 3/10 pain. Pain medication administered, see MAR and anesthesia record.   1058- Report to DASH Jacobs  1107- VSS, see flowsheets. Patient transferred out of PACU with CNA and transport.

## 2018-08-28 VITALS
DIASTOLIC BLOOD PRESSURE: 75 MMHG | HEART RATE: 81 BPM | OXYGEN SATURATION: 95 % | RESPIRATION RATE: 18 BRPM | SYSTOLIC BLOOD PRESSURE: 148 MMHG | WEIGHT: 210.1 LBS | BODY MASS INDEX: 36.06 KG/M2 | TEMPERATURE: 97.9 F

## 2018-08-28 LAB
ANION GAP SERPL CALC-SCNC: 9 MMOL/L (ref 0–11.9)
BUN SERPL-MCNC: 12 MG/DL (ref 8–22)
CALCIUM SERPL-MCNC: 8.4 MG/DL (ref 8.4–10.2)
CHLORIDE SERPL-SCNC: 106 MMOL/L (ref 96–112)
CO2 SERPL-SCNC: 21 MMOL/L (ref 20–33)
CREAT SERPL-MCNC: 0.61 MG/DL (ref 0.5–1.4)
GLUCOSE SERPL-MCNC: 139 MG/DL (ref 65–99)
HCT VFR BLD AUTO: 27.9 % (ref 37–47)
HGB BLD-MCNC: 9.3 G/DL (ref 12–16)
POTASSIUM SERPL-SCNC: 3.4 MMOL/L (ref 3.6–5.5)
SODIUM SERPL-SCNC: 136 MMOL/L (ref 135–145)

## 2018-08-28 PROCEDURE — A9270 NON-COVERED ITEM OR SERVICE: HCPCS | Performed by: ORTHOPAEDIC SURGERY

## 2018-08-28 PROCEDURE — G8979 MOBILITY GOAL STATUS: HCPCS | Mod: CJ

## 2018-08-28 PROCEDURE — 700102 HCHG RX REV CODE 250 W/ 637 OVERRIDE(OP): Performed by: ORTHOPAEDIC SURGERY

## 2018-08-28 PROCEDURE — 700111 HCHG RX REV CODE 636 W/ 250 OVERRIDE (IP): Performed by: ORTHOPAEDIC SURGERY

## 2018-08-28 PROCEDURE — G8987 SELF CARE CURRENT STATUS: HCPCS | Mod: CJ

## 2018-08-28 PROCEDURE — 96376 TX/PRO/DX INJ SAME DRUG ADON: CPT

## 2018-08-28 PROCEDURE — 85018 HEMOGLOBIN: CPT

## 2018-08-28 PROCEDURE — G8988 SELF CARE GOAL STATUS: HCPCS | Mod: CJ

## 2018-08-28 PROCEDURE — 36415 COLL VENOUS BLD VENIPUNCTURE: CPT

## 2018-08-28 PROCEDURE — 94660 CPAP INITIATION&MGMT: CPT

## 2018-08-28 PROCEDURE — 97535 SELF CARE MNGMENT TRAINING: CPT

## 2018-08-28 PROCEDURE — G8989 SELF CARE D/C STATUS: HCPCS | Mod: CJ

## 2018-08-28 PROCEDURE — 97161 PT EVAL LOW COMPLEX 20 MIN: CPT

## 2018-08-28 PROCEDURE — 80048 BASIC METABOLIC PNL TOTAL CA: CPT

## 2018-08-28 PROCEDURE — G0378 HOSPITAL OBSERVATION PER HR: HCPCS

## 2018-08-28 PROCEDURE — G8980 MOBILITY D/C STATUS: HCPCS | Mod: CJ

## 2018-08-28 PROCEDURE — 700112 HCHG RX REV CODE 229: Performed by: ORTHOPAEDIC SURGERY

## 2018-08-28 PROCEDURE — 94760 N-INVAS EAR/PLS OXIMETRY 1: CPT

## 2018-08-28 PROCEDURE — 700105 HCHG RX REV CODE 258: Performed by: ORTHOPAEDIC SURGERY

## 2018-08-28 PROCEDURE — 85014 HEMATOCRIT: CPT

## 2018-08-28 PROCEDURE — 97165 OT EVAL LOW COMPLEX 30 MIN: CPT

## 2018-08-28 PROCEDURE — G8978 MOBILITY CURRENT STATUS: HCPCS | Mod: CJ

## 2018-08-28 RX ADMIN — AMLODIPINE BESYLATE 5 MG: 5 TABLET ORAL at 05:40

## 2018-08-28 RX ADMIN — SODIUM CHLORIDE 2 G: 9 INJECTION, SOLUTION INTRAVENOUS at 00:46

## 2018-08-28 RX ADMIN — ACETAMINOPHEN 650 MG: 325 TABLET, FILM COATED ORAL at 05:40

## 2018-08-28 RX ADMIN — DOCUSATE SODIUM 100 MG: 100 CAPSULE, LIQUID FILLED ORAL at 05:40

## 2018-08-28 RX ADMIN — LOSARTAN POTASSIUM 100 MG: 25 TABLET, FILM COATED ORAL at 05:39

## 2018-08-28 RX ADMIN — KETOROLAC TROMETHAMINE 15 MG: 30 INJECTION, SOLUTION INTRAMUSCULAR at 05:40

## 2018-08-28 RX ADMIN — METOPROLOL SUCCINATE 25 MG: 25 TABLET, EXTENDED RELEASE ORAL at 05:44

## 2018-08-28 RX ADMIN — FOLIC ACID 1 MG: 1 TABLET ORAL at 05:40

## 2018-08-28 RX ADMIN — ACETAMINOPHEN 650 MG: 325 TABLET, FILM COATED ORAL at 00:43

## 2018-08-28 RX ADMIN — KETOROLAC TROMETHAMINE 15 MG: 30 INJECTION, SOLUTION INTRAMUSCULAR at 00:44

## 2018-08-28 RX ADMIN — OXYCODONE HYDROCHLORIDE 5 MG: 5 TABLET ORAL at 05:40

## 2018-08-28 ASSESSMENT — GAIT ASSESSMENTS
DISTANCE (FEET): 15
DEVIATION: ANTALGIC;STEP TO;DECREASED HEEL STRIKE;DECREASED TOE OFF
GAIT LEVEL OF ASSIST: SUPERVISED
ASSISTIVE DEVICE: FRONT WHEEL WALKER

## 2018-08-28 ASSESSMENT — COGNITIVE AND FUNCTIONAL STATUS - GENERAL
WALKING IN HOSPITAL ROOM: A LITTLE
CLIMB 3 TO 5 STEPS WITH RAILING: A LITTLE
MOVING FROM LYING ON BACK TO SITTING ON SIDE OF FLAT BED: A LITTLE
STANDING UP FROM CHAIR USING ARMS: A LITTLE
SUGGESTED CMS G CODE MODIFIER MOBILITY: CJ
MOBILITY SCORE: 20

## 2018-08-28 ASSESSMENT — ACTIVITIES OF DAILY LIVING (ADL): TOILETING: INDEPENDENT

## 2018-08-28 ASSESSMENT — PAIN SCALES - GENERAL
PAINLEVEL_OUTOF10: 0
PAINLEVEL_OUTOF10: 4
PAINLEVEL_OUTOF10: 4

## 2018-08-28 NOTE — PROGRESS NOTES
Discussed with pt about ambulating 50ft in the corley. Pt hesitant about ambulating, fearful of body pain r/t her RA. Pt encouraged & reassured about ambulating. Pt assisted up out of recliner chair with 2 person assist & FWW. RN provided cueing for pt regarding technique & FWW. Pt able to ambulate 50ft - steady on feet, some SOB with activity. Pt assisted back to bed safely. Pt tired once back in bed. Denies any further needs at this time. Hourly rounds continue.

## 2018-08-28 NOTE — PROGRESS NOTES
C/O: Doing well.     Blood pressure 148/75, pulse 81, temperature 36.6 °C (97.9 °F), resp. rate 18, weight 95.3 kg (210 lb 1.6 oz), SpO2 95 %, not currently breastfeeding.      Recent Labs      08/28/18   0508   HEMOGLOBIN  9.3*   HEMATOCRIT  27.9*     Recent Labs      08/28/18   0508   SODIUM  136   POTASSIUM  3.4*   CHLORIDE  106   CO2  21   GLUCOSE  139*   BUN  12   CREATININE  0.61   CALCIUM  8.4     Dressing: CD&I   Sensation: intact  Pulses: 2+   Motor: intact   X-ray: components well-positioned      Assessment:  POD 1 R TKA   Plan: PT/OT WBAT with walker

## 2018-08-28 NOTE — PROGRESS NOTES
"Total Joint Replacement Program Rounding     Inpatient bedside rounding completed to address quality of care provided by total joint replacement program IDT and overall patient experience at [facility].    Patient Satisfaction addressed. Patient/family updated on POC during hospital stay.  Thorough safety education completed including use of call light prior to all mobility throughout the entirety of the hospital stay. Also educated on ankle pumps and incentive inspirometry use to prevent post-op complications.    No further questions/concerns at this time. Please see \"MyRounding\" for further details of rounding discussion. RN updated.     Patient states pain much improved post-operatively from her baseline pain due to RA.    Per MD, no ROM restrictions at this time, WBAT on RLE.   "

## 2018-08-28 NOTE — CARE PLAN
Problem: Safety  Goal: Will remain free from falls  Able to demo call light & within reach, non-skid socks in place, personal belongings within reach, room floor free of clutter, bed alarm on through out shift    Problem: Pain Management  Goal: Pain level will decrease to patient's comfort goal  Scheduled & PRN pain medications given - see MAR

## 2018-08-28 NOTE — FLOWSHEET NOTE
08/28/18 0000   CPAP/BiPAP [facility] [employer]   Nocturnal CPAP or BiPAP CPAP   #System Evaluation Yes   Home Unit Used? Yes   Equipment Inspected for Cleanliness, Operation, and Safety? Yes   Settings (If Known) CPAP   FiO2 or LPM 4   Home Mask Used? Yes

## 2018-08-28 NOTE — DISCHARGE INSTRUCTIONS
Take 325mg of Aspirin 2 times a day.    Discharge Instructions        Discharged to home by car with relative. Discharged via wheelchair, hospital escort: Yes.  Special equipment needed: Walker    Be sure to schedule a follow-up appointment with your primary care doctor or any specialists as instructed.     Discharge Plan:   Diet Plan: Discussed  Activity Level: Discussed  Confirmed Follow up Appointment: Patient to Call and Schedule Appointment  Confirmed Symptoms Management: Discussed  Medication Reconciliation Updated: Yes  Pneumococcal Vaccine Administered/Refused: Not given - Patient refused pneumococcal vaccine  Influenza Vaccine Indication: Patient Refuses    I understand that a diet low in cholesterol, fat, and sodium is recommended for good health. Unless I have been given specific instructions below for another diet, I accept this instruction as my diet prescription.   Other diet: reg    Special Instructions: Discharge instructions for the Orthopedic Patient    Follow up with Primary Care Physician within 2 weeks of discharge to home, regarding:  Review of medications and diagnostic testing.  Surveillance for medical complications.  Workup and treatment of osteoporosis, if appropriate.     -Is this a Joint Replacement patient? Yes Total Joint Knee Replacement Discharge Instructions    Pain  - The goal is to slowly wean off the prescription pain medicine.  - Ice can be used for pain control.  20 minutes at a time is recommended, and never directly against your skin or incision.  - Most patients are off the pain pills by 3 weeks; others may require a low level of pain medications for many months.  If your pain continues to be severe, follow up with your physician.  Infection    Knee joint infections; occur in fewer than 2% of patients. The most common causes of infection following total knee replacement surgery are from bacteria that enter the bloodstream during dental procedures, urinary tract infections,  or skin infections. These bacteria can lodge around your knee replacement and cause an infection.  - Keep the incision as clean and dry as possible.  - Always wash your hands before touching your incision.  - Skin infections tend to develop around 7-10 days after surgery; most can be treated with oral antibiotics.  - Dental Care should be delayed for 3 months after surgery, your surgeon recommends taking a dose of antibiotics 1 hour prior to any dental procedure. After 2 years, most surgeons recommend antibiotics only before an extensive procedure.  Ask your surgeon what he recommends.  - Signs and symptoms of infection can include:  low grade fever, redness, pain, swelling and drainage from your incision.  Notify your surgeon immediately if you develop any of these symptoms.  Other instructions  - Bowel habits - constipation is extremely common and is caused by a combination of anesthesia, lack of mobility and pain medicine.  Use stool softeners or laxatives if necessary. It is important not to ignore this problem, as bowel obstructions can be a serious complication after joint replacement surgery.  - Mood/Energy Level - Many patients experience a lack of energy and endurance for up to 2-3 months after surgery.  Some may also feel down and can even become depressed.  This is likely due to the postoperative anemia, change in activity level, lack of sleep, pain medicine and just the emotional reaction to the surgery itself that is a big disruption in a person’s life.  This usually passes.  If symptoms persist, follow up with your primary physician.  - Returning to work - Your surgeon will give you more specific instructions. Depending on the type of activities you perform, it may be 6 to 8 weeks before you return to work.   Generally, if you work a sedentary job requiring little standing or walking, most patients may return within 2-6 weeks.  Manual labor jobs involving walking, lifting and standing may take longer.  Your surgeon’s office can provide a release to part-time or light duty work early on in your recovery and progress you to full duty as able.    - Driving - If your left knee was replaced and you have an automatic transmission, you may be able to begin driving in a week or so, provided you are no longer taking narcotic pain medication. If your right knee was replaced, avoid driving for 6 to 8 weeks. Remember that your reflexes may not be as sharp as before your surgery. Ask your surgeon for specific instructions.   - Avoiding falls - A fall during the first few weeks after surgery can damage your new knee and may result in a need for further surgery.   throw rugs and tack down loose carpeting.  Be aware of floor hazards such as pets, small objects or uneven surfaces.    - Airport Metal Detectors - The sensitivity of metal detectors varies and it is likely that your prosthesis will cause an alarm.  Inform the  of your artificial joint.  Diet  - Resume your normal diet as tolerated.  - It is important to achieve a healthy nutritional status by eating a well balanced diet on a regular basis.  - Your physician may recommend that you take iron and vitamin supplements.   - Continue to drink plenty of fluids.  Shower/Bathing  - You may shower as soon as you get home from the hospital unless otherwise instructed.  - Keep your incision out of water.  To keep the incision dry when showering, cover it with a plastic bag or plastic wrap.  - Pat incision dry if it gets wet.  Don’t rub.  - Do not submerge in a bath until staples are out and the incision is completely healed. (Approximately 6-8 weeks)  Dressing Change:  Procedure (if recommended by your physician)  - Wash hands.  - Open all new dressing change materials.  - Remove old dressing and discard.  - Inspect incision for redness, increase in clear drainage, yellow/green drainage, odor and surrounding skin hot to touch.  -  ABD (large gauze)  pad or “island dressing” by one corner and lay over the incision.  Be careful not to touch the inside of the dressing that will lay over the incision.  - Secure in place as instructed (Ace wrap or tape).    Swelling/Bruising    - Swelling can last from 3-6 months.  - Elevate your leg higher than your heart while reclining.   The first week you are home you should elevate your leg an equal amount of time, as you are active.    - Anti-inflammatory pills can be taken once you have stopped the blood thinners.  - The swelling is usually worse after you go home since you are upright for longer periods of time.  - Bruising is common and can involve the entire leg including the thigh, calf and even foot. Bruising often does not appear until after you arrive home and it can be quite dramatic- purple, black, and green.  The bruising you can see is not usually concerning and will subside without any treatment.      Blood Clot Prevention  Blood clots in the legs and the less common, but frightening, clots that travel to the lungs are a real focus of our preventative. Most patients are at standard risk for them, but those patients who are at higher risk include people who have had previous clots, a family history of clotting, smoking, diabetes, obesity, advanced age, use of estrogen and a sedentary lifestyle.    - Signs of blood clots in legs - Swelling in thigh, calf or ankle that does not go down with elevation.  Pain, heat and tenderness in calf, back of calf or groin area.  NOTE: blood clots can occur in either leg.  - You have been receiving anticoagulant therapy (blood thinners) in the hospital and you may be instructed to continue at home depending on your risk factors.  - Your risk for developing a clot continues for up to 2-3 months after surgery.  You should avoid prolonged sitting and dehydration during that time (long air trips and car trips).  If you do take a trip during this time, please get up and move around  every 1- 1.5 hours.  - If you are prescribed blood-thinning medication for home, follow instructions as directed. (Handouts provided if applicable).      Activity  Once home, you should continue to stay active. The key is to remember not to overdo it! While you can expect some good days and some bad days, you should notice a gradual improvement and a gradual increase in your endurance over the next 6 to 12 months. Exercise is a critical component of home care, particularly during the first few weeks after surgery.     - Normal activities of daily living You should be able to resume most within 3 to 6 weeks following surgery. Some pain with activity and at night is common for several weeks after surgery  -  Physical Therapy Exercises - Continue to do the exercises prescribed for at least two months after surgery. Riding a stationary bicycle can help maintain muscle tone and keep your knee flexible. Try to achieve the maximum degree of bending and extension possible. (handout provided by Therapist).  - Sexual Activity -. Your surgeon can tell you when it safe to resume sexual activity.    - Sleeping Positions - You can safely sleep on your back, on either side, or on your stomach.   - Other Activities - Walk as much as you like, but remember that walking is no substitute for the exercises your doctor and physical therapist will prescribe. Lower impact activities are preferred.  If you have specific questions, consult your Surgeon.    When to Call the Doctor   Call the physician if:   - Fever over 100.5? F  - Increased pain, drainage, redness, odor or heat around the incision area  - Shaking chills  - Increased knee pain with activity and rest  - Increased pain in calf, tenderness or redness above or below the knee  - Increased swelling of calf, ankle, foot  - Sudden increased shortness of breath, sudden onset of chest pain, localized chest pain with coughing  - Incision opening  Or, if there are any questions or  concerns about medications or care.       -Is this patient being discharged with medication to prevent blood clots?  Yes, Aspirin Aspirin, ASA oral tablets  What is this medicine?  ASPIRIN (AS pir in) is a pain reliever. It is used to treat mild pain and fever. This medicine is also used as directed by a doctor to prevent and to treat heart attacks, to prevent strokes, and to treat arthritis or inflammation.  This medicine may be used for other purposes; ask your health care provider or pharmacist if you have questions.  COMMON BRAND NAME(S): Aspir-Low, Aspir-Edwige, Aspirtab, Manoj Advanced Aspirin, Manoj Aspirin, Manoj Aspirin Extra Strength, Manoj Aspirin Plus, Manoj Extra Strength, Manoj Extra Strength Plus, Manoj Genuine Aspirin, Manoj Womens Aspirin, Bufferin, Bufferin Extra Strength, Bufferin Low Dose  What should I tell my health care provider before I take this medicine?  They need to know if you have any of these conditions:  -anemia  -asthma  -bleeding problems  -child with chickenpox, the flu, or other viral infection  -diabetes  -gout  -if you frequently drink alcohol containing drinks  -kidney disease  -liver disease  -low level of vitamin K  -lupus  -smoke tobacco  -stomach ulcers or other problems  -an unusual or allergic reaction to aspirin, tartrazine dye, other medicines, dyes, or preservatives  -pregnant or trying to get pregnant  -breast-feeding  How should I use this medicine?  Take this medicine by mouth with a glass of water. Follow the directions on the package or prescription label. You can take this medicine with or without food. If it upsets your stomach, take it with food. Do not take your medicine more often than directed.  Talk to your pediatrician regarding the use of this medicine in children. While this drug may be prescribed for children as young as 12 years of age for selected conditions, precautions do apply. Children and teenagers should not use this medicine to treat chicken pox  or flu symptoms unless directed by a doctor.  Patients over 65 years old may have a stronger reaction and need a smaller dose.  Overdosage: If you think you have taken too much of this medicine contact a poison control center or emergency room at once.  NOTE: This medicine is only for you. Do not share this medicine with others.  What if I miss a dose?  If you are taking this medicine on a regular schedule and miss a dose, take it as soon as you can. If it is almost time for your next dose, take only that dose. Do not take double or extra doses.  What may interact with this medicine?  Do not take this medicine with any of the following medications:  -cidofovir  -ketorolac  -probenecid  This medicine may also interact with the following medications:  -alcohol  -alendronate  -bismuth subsalicylate  -flavocoxid  -herbal supplements like feverfew, garlic, john, ginkgo biloba, horse chestnut  -medicines for diabetes or glaucoma like acetazolamide, methazolamide  -medicines for gout  -medicines that treat or prevent blood clots like enoxaparin, heparin, ticlopidine, warfarin  -other aspirin and aspirin-like medicines  -NSAIDs, medicines for pain and inflammation, like ibuprofen or naproxen  -pemetrexed  -sulfinpyrazone  -varicella live vaccine  This list may not describe all possible interactions. Give your health care provider a list of all the medicines, herbs, non-prescription drugs, or dietary supplements you use. Also tell them if you smoke, drink alcohol, or use illegal drugs. Some items may interact with your medicine.  What should I watch for while using this medicine?  If you are treating yourself for pain, tell your doctor or health care professional if the pain lasts more than 10 days, if it gets worse, or if there is a new or different kind of pain. Tell your doctor if you see redness or swelling. Also, check with your doctor if you have a fever that lasts for more than 3 days. Only take this medicine to  prevent heart attacks or blood clotting if prescribed by your doctor or health care professional.  Do not take aspirin or aspirin-like medicines with this medicine. Too much aspirin can be dangerous. Always read the labels carefully.  This medicine can irritate your stomach or cause bleeding problems. Do not smoke cigarettes or drink alcohol while taking this medicine. Do not lie down for 30 minutes after taking this medicine to prevent irritation to your throat.  If you are scheduled for any medical or dental procedure, tell your healthcare provider that you are taking this medicine. You may need to stop taking this medicine before the procedure.  This medicine may be used to treat migraines. If you take migraine medicines for 10 or more days a month, your migraines may get worse. Keep a diary of headache days and medicine use. Contact your healthcare professional if your migraine attacks occur more frequently.  What side effects may I notice from receiving this medicine?  Side effects that you should report to your doctor or health care professional as soon as possible:  -allergic reactions like skin rash, itching or hives, swelling of the face, lips, or tongue  -breathing problems  -changes in hearing, ringing in the ears  -confusion  -general ill feeling or flu-like symptoms  -pain on swallowing  -redness, blistering, peeling or loosening of the skin, including inside the mouth or nose  -signs and symptoms of bleeding such as bloody or black, tarry stools; red or dark-brown urine; spitting up blood or brown material that looks like coffee grounds; red spots on the skin; unusual bruising or bleeding from the eye, gums, or nose  -trouble passing urine or change in the amount of urine  -unusually weak or tired  -yellowing of the eyes or skin  Side effects that usually do not require medical attention (report to your doctor or health care professional if they continue or are bothersome):  -diarrhea or  constipation  -headache  -nausea, vomiting  -stomach gas, heartburn  This list may not describe all possible side effects. Call your doctor for medical advice about side effects. You may report side effects to FDA at 8-667-ITR-3433.  Where should I keep my medicine?  Keep out of the reach of children.  Store at room temperature between 15 and 30 degrees C (59 and 86 degrees F). Protect from heat and moisture. Do not use this medicine if it has a strong vinegar smell. Throw away any unused medicine after the expiration date.  NOTE: This sheet is a summary. It may not cover all possible information. If you have questions about this medicine, talk to your doctor, pharmacist, or health care provider.  © 2018 [employer]/Gold Standard (2014-08-19 11:30:31)      · Is patient discharged on Warfarin / Coumadin?   No     Depression / Suicide Risk    As you are discharged from this [facility] facility, it is important to learn how to keep safe from harming yourself.    Recognize the warning signs:  · Abrupt changes in personality, positive or negative- including increase in energy   · Giving away possessions  · Change in eating patterns- significant weight changes-  positive or negative  · Change in sleeping patterns- unable to sleep or sleeping all the time   · Unwillingness or inability to communicate  · Depression  · Unusual sadness, discouragement and loneliness  · Talk of wanting to die  · Neglect of personal appearance   · Rebelliousness- reckless behavior  · Withdrawal from people/activities they love  · Confusion- inability to concentrate     If you or a loved one observes any of these behaviors or has concerns about self-harm, here's what you can do:  · Talk about it- your feelings and reasons for harming yourself  · Remove any means that you might use to hurt yourself (examples: pills, rope, extension cords, firearm)  · Get professional help from the community (Mental Health, Substance Abuse, psychological  counseling)  · Do not be alone:Call your Safe Contact- someone whom you trust who will be there for you.  · Call your local CRISIS HOTLINE 560-6850 or 736-187-0330  · Call your local Children's Mobile Crisis Response Team [place] (187) 864-1399 or www.jiffstore  · Call the toll free National Suicide Prevention Hotlines   · National Suicide Prevention Lifeline 202-099-SCPK (4330)  · National Hope Line Network 800-SUICIDE (751-7843)

## 2018-08-28 NOTE — PROGRESS NOTES
Received report from NOC shift RN and assumed care. Pt is a&ox4. Orders received to remove Hemovac and nj. Removed and pt tolerated well. Awaiting PT/OT and pt will need to void before d/c.

## 2018-08-28 NOTE — DISCHARGE SUMMARY
DATE OF ADMISSION:  08/27/2018    DATE OF DISCHARGE:  08/28/2018    ADMISSION DIAGNOSES:  Right knee rheumatoid arthritis with severe secondary   degenerative joint disease and patellar tendon calcific tendinitis.    POSTOPERATIVE DIAGNOSES:  Right knee rheumatoid arthritis with severe   secondary degenerative joint disease and patellar tendon calcific tendinitis,   status post right total knee arthroplasty.    PROCEDURE:  Right total knee arthroplasty.    COMPLICATIONS:  None.    HISTORY:  Patient is a 72-year-old woman with history of bilateral knee   rheumatoid arthritis involvement with severe DJD.  She also has radiographic   evidence of calcific tendinitis of the right patellar tendon.  She is   indicated for total knee arthroplasty.    HOSPITAL COURSE:  Patient underwent right total knee arthroplasty on the day   of admission.  She recovered appropriately on the orthopedics floor and was   mobilized appropriately.  She was maintained on mechanical DVT prophylaxis and   aspirin 325 p.o. b.i.d.  It was felt that she was ready for discharge to home   on postoperative day #1.    DISPOSITION:  The patient will be discharged to home, weightbearing as   tolerated with a walker.  She will start outpatient physical therapy.  She   will continue aspirin 325 p.o. b.i.d.  She will follow up in my office in 2   weeks.       ____________________________________     MD KAVIN Ortiz / NINA    DD:  08/28/2018 08:17:19  DT:  08/28/2018 08:45:41    D#:  [id number]  Job#:  [id number]

## 2018-08-28 NOTE — THERAPY
"Physical Therapy Evaluation completed.   Bed Mobility:  Supine to Sit:  (Pt up in chair upon entering room )  Transfers: Sit to Stand: Supervised  Gait: Level Of Assist: Supervised with Front-Wheel Walker       Plan of Care: Patient with no further skilled PT needs in the acute care setting at this time  Discharge Recommendations: Equipment: No Equipment Needed. Post-acute therapy Discharge to home with outpatient for additional skilled therapy services.    See \"Rehab Therapy-Acute\" Patient Summary Report for complete documentation.       Pt is a 73 y/o female presenting s/p right TKA. Pt was previously limited in mobility due to pain at right knee and is now able to ambulate again. Pt demonstrating good safety awareness and balance with activity and has good insight into her current health issues. Pt with good family support and owns several pieces of equipment as well as she has a hx of RA. Pt is set up with outpatient therapy upon DC and appears safe to DC home at this time from a PT perspective. Pt with no further skilled acute PT needs at this time.   "

## 2018-08-28 NOTE — CARE PLAN
Problem: Knowledge Deficit  Goal: Knowledge of disease process/condition, treatment plan, diagnostic tests, and medications will improve    Intervention: Explain information regarding disease process/condition, treatment plan, diagnostic tests, and medications and document in education  1115 admitted to room 215 from PACU with VSS, dressing CDI to Rt Knee with immobilizer on and neurovasc status intact, no post op n/v, all admission orders reviewed with pt and , expresses comfortable no pain when up to chair at 1500

## 2018-08-28 NOTE — THERAPY
"Occupational Therapy Evaluation completed.   Functional Status:  Pt seated in chair.  Able to dress LB with Arias, UB with CGA.  Spouse at bedside and attentive, able to assist with LB dressing.   Pt denied need to demo toileting with OT at this moment.  Pt has adequate AE at home as she varies in her functional level due to RA. Educated pt and spouse on role of OT and Total Knee Replacement Protocol.  Reviewed application of precautions and use of Adaptive Equipment for dressing, bathing, toileting, grooming, kitchen activities and general functional mobility in the home. Reviewed the use of reacher (tongs), shower chair and raised toilet seat to assist with ADL's.  Reviewed tub/ shower transfers. Provided pt with handout and suggestions on where to obtain needed items. Educated on covering incision/dressing with extra plastic wrap and waterproof tape to ensure that incision does not get wet.  Educated on around the clock pain management strategies so that pt does not wake up in a pain crisis.  Pt verbalized understanding of all education provided.    Plan of Care: Patient with no further skilled OT needs in the acute care setting at this time  Discharge Recommendations:  Equipment: No Equipment Needed. Post-acute therapy Discharge to home with outpatient or home health for additional skilled therapy services.    See \"Rehab Therapy-Acute\" Patient Summary Report for complete documentation.    "

## 2018-08-28 NOTE — PROGRESS NOTES
Pt discharged to home. Discharge instructions given to pt. Pt verbalizes understanding of instructions at this time. IV removed per policy. Dressing applied to site. Pt tolerated well. Escorted to facility entrance by staff via wheelchair. Accompanied by spouse. Transported via private vehicle. Extra dressings provided to pt.

## 2018-11-12 ENCOUNTER — HOSPITAL ENCOUNTER (OUTPATIENT)
Dept: LAB | Facility: MEDICAL CENTER | Age: 73
End: 2018-11-12
Attending: NURSE PRACTITIONER
Payer: MEDICARE

## 2018-11-12 PROCEDURE — 86803 HEPATITIS C AB TEST: CPT | Mod: GY

## 2018-11-12 PROCEDURE — 36415 COLL VENOUS BLD VENIPUNCTURE: CPT | Mod: GY

## 2018-11-13 LAB — HCV AB SER QL: NEGATIVE

## 2019-01-14 ENCOUNTER — HOSPITAL ENCOUNTER (OUTPATIENT)
Dept: LAB | Facility: MEDICAL CENTER | Age: 74
End: 2019-01-14
Attending: NURSE PRACTITIONER
Payer: MEDICARE

## 2019-01-14 LAB
ALBUMIN SERPL BCP-MCNC: 3.8 G/DL (ref 3.2–4.9)
ALBUMIN/GLOB SERPL: 1 G/DL
ALP SERPL-CCNC: 77 U/L (ref 30–99)
ALT SERPL-CCNC: 8 U/L (ref 2–50)
ANION GAP SERPL CALC-SCNC: 7 MMOL/L (ref 0–11.9)
AST SERPL-CCNC: 11 U/L (ref 12–45)
BILIRUB SERPL-MCNC: 0.4 MG/DL (ref 0.1–1.5)
BUN SERPL-MCNC: 12 MG/DL (ref 8–22)
CALCIUM SERPL-MCNC: 10.1 MG/DL (ref 8.5–10.5)
CHLORIDE SERPL-SCNC: 105 MMOL/L (ref 96–112)
CHOLEST SERPL-MCNC: 109 MG/DL (ref 100–199)
CO2 SERPL-SCNC: 24 MMOL/L (ref 20–33)
CREAT SERPL-MCNC: 0.9 MG/DL (ref 0.5–1.4)
FASTING STATUS PATIENT QL REPORTED: NORMAL
GLOBULIN SER CALC-MCNC: 3.8 G/DL (ref 1.9–3.5)
GLUCOSE SERPL-MCNC: 91 MG/DL (ref 65–99)
HDLC SERPL-MCNC: 32 MG/DL
LDLC SERPL CALC-MCNC: 62 MG/DL
POTASSIUM SERPL-SCNC: 4.4 MMOL/L (ref 3.6–5.5)
PROT SERPL-MCNC: 7.6 G/DL (ref 6–8.2)
SODIUM SERPL-SCNC: 136 MMOL/L (ref 135–145)
TRIGL SERPL-MCNC: 77 MG/DL (ref 0–149)

## 2019-01-14 PROCEDURE — 36415 COLL VENOUS BLD VENIPUNCTURE: CPT

## 2019-01-14 PROCEDURE — 84443 ASSAY THYROID STIM HORMONE: CPT

## 2019-01-14 PROCEDURE — 82306 VITAMIN D 25 HYDROXY: CPT

## 2019-01-14 PROCEDURE — 80061 LIPID PANEL: CPT

## 2019-01-14 PROCEDURE — 80053 COMPREHEN METABOLIC PANEL: CPT

## 2019-01-15 LAB
25(OH)D3 SERPL-MCNC: 62 NG/ML (ref 30–100)
TSH SERPL DL<=0.005 MIU/L-ACNC: 3.05 UIU/ML (ref 0.38–5.33)

## 2019-03-05 ENCOUNTER — HOSPITAL ENCOUNTER (OUTPATIENT)
Dept: LAB | Facility: MEDICAL CENTER | Age: 74
End: 2019-03-05
Attending: NURSE PRACTITIONER
Payer: MEDICARE

## 2019-03-05 LAB
ALBUMIN SERPL BCP-MCNC: 4.1 G/DL (ref 3.2–4.9)
ALBUMIN/GLOB SERPL: 1.1 G/DL
ALP SERPL-CCNC: 74 U/L (ref 30–99)
ALT SERPL-CCNC: 15 U/L (ref 2–50)
ANION GAP SERPL CALC-SCNC: 8 MMOL/L (ref 0–11.9)
AST SERPL-CCNC: 15 U/L (ref 12–45)
BASOPHILS # BLD AUTO: 0.9 % (ref 0–1.8)
BASOPHILS # BLD: 0.1 K/UL (ref 0–0.12)
BILIRUB SERPL-MCNC: 0.4 MG/DL (ref 0.1–1.5)
BUN SERPL-MCNC: 16 MG/DL (ref 8–22)
CALCIUM SERPL-MCNC: 9.8 MG/DL (ref 8.5–10.5)
CHLORIDE SERPL-SCNC: 102 MMOL/L (ref 96–112)
CO2 SERPL-SCNC: 24 MMOL/L (ref 20–33)
CREAT SERPL-MCNC: 0.81 MG/DL (ref 0.5–1.4)
EOSINOPHIL # BLD AUTO: 0.19 K/UL (ref 0–0.51)
EOSINOPHIL NFR BLD: 1.7 % (ref 0–6.9)
ERYTHROCYTE [DISTWIDTH] IN BLOOD BY AUTOMATED COUNT: 64.2 FL (ref 35.9–50)
GLOBULIN SER CALC-MCNC: 3.8 G/DL (ref 1.9–3.5)
GLUCOSE SERPL-MCNC: 85 MG/DL (ref 65–99)
HCT VFR BLD AUTO: 39.7 % (ref 37–47)
HGB BLD-MCNC: 12.8 G/DL (ref 12–16)
IMM GRANULOCYTES # BLD AUTO: 0.05 K/UL (ref 0–0.11)
IMM GRANULOCYTES NFR BLD AUTO: 0.4 % (ref 0–0.9)
LYMPHOCYTES # BLD AUTO: 2.38 K/UL (ref 1–4.8)
LYMPHOCYTES NFR BLD: 21.3 % (ref 22–41)
MCH RBC QN AUTO: 31.9 PG (ref 27–33)
MCHC RBC AUTO-ENTMCNC: 32.2 G/DL (ref 33.6–35)
MCV RBC AUTO: 99 FL (ref 81.4–97.8)
MONOCYTES # BLD AUTO: 0.68 K/UL (ref 0–0.85)
MONOCYTES NFR BLD AUTO: 6.1 % (ref 0–13.4)
NEUTROPHILS # BLD AUTO: 7.78 K/UL (ref 2–7.15)
NEUTROPHILS NFR BLD: 69.6 % (ref 44–72)
NRBC # BLD AUTO: 0 K/UL
NRBC BLD-RTO: 0 /100 WBC
PLATELET # BLD AUTO: 311 K/UL (ref 164–446)
PMV BLD AUTO: 9.4 FL (ref 9–12.9)
POTASSIUM SERPL-SCNC: 4.7 MMOL/L (ref 3.6–5.5)
PROT SERPL-MCNC: 7.9 G/DL (ref 6–8.2)
RBC # BLD AUTO: 4.01 M/UL (ref 4.2–5.4)
SODIUM SERPL-SCNC: 134 MMOL/L (ref 135–145)
WBC # BLD AUTO: 11.2 K/UL (ref 4.8–10.8)

## 2019-03-05 PROCEDURE — 85025 COMPLETE CBC W/AUTO DIFF WBC: CPT

## 2019-03-05 PROCEDURE — 80053 COMPREHEN METABOLIC PANEL: CPT

## 2019-03-05 PROCEDURE — 36415 COLL VENOUS BLD VENIPUNCTURE: CPT

## 2019-06-03 ENCOUNTER — HOSPITAL ENCOUNTER (OUTPATIENT)
Dept: RADIOLOGY | Facility: MEDICAL CENTER | Age: 74
End: 2019-06-03
Attending: INTERNAL MEDICINE
Payer: MEDICARE

## 2019-06-03 DIAGNOSIS — M25.572 PAIN IN JOINTS OF BOTH FEET: ICD-10-CM

## 2019-06-03 DIAGNOSIS — Z11.1 TUBERCULOSIS SCREENING: ICD-10-CM

## 2019-06-03 DIAGNOSIS — M25.542 ARTHRALGIA OF BOTH HANDS: ICD-10-CM

## 2019-06-03 DIAGNOSIS — M05.79 RHEUMATOID ARTHRITIS INVOLVING MULTIPLE SITES WITH POSITIVE RHEUMATOID FACTOR (HCC): ICD-10-CM

## 2019-06-03 DIAGNOSIS — M05.79 SEROPOSITIVE RHEUMATOID ARTHRITIS OF MULTIPLE SITES (HCC): ICD-10-CM

## 2019-06-03 DIAGNOSIS — M25.511 PAIN OF BOTH SHOULDER JOINTS: ICD-10-CM

## 2019-06-03 DIAGNOSIS — M25.571 PAIN IN JOINTS OF BOTH FEET: ICD-10-CM

## 2019-06-03 DIAGNOSIS — M25.512 PAIN OF BOTH SHOULDER JOINTS: ICD-10-CM

## 2019-06-03 DIAGNOSIS — M25.541 ARTHRALGIA OF BOTH HANDS: ICD-10-CM

## 2019-06-03 DIAGNOSIS — M54.2 NECK PAIN: ICD-10-CM

## 2019-06-03 PROCEDURE — 73620 X-RAY EXAM OF FOOT: CPT | Mod: RT

## 2019-06-03 PROCEDURE — 73120 X-RAY EXAM OF HAND: CPT | Mod: LT

## 2019-06-03 PROCEDURE — 72050 X-RAY EXAM NECK SPINE 4/5VWS: CPT

## 2019-06-03 PROCEDURE — 71046 X-RAY EXAM CHEST 2 VIEWS: CPT

## 2019-06-03 PROCEDURE — 73030 X-RAY EXAM OF SHOULDER: CPT | Mod: RT

## 2019-07-02 ENCOUNTER — HOSPITAL ENCOUNTER (OUTPATIENT)
Dept: LAB | Facility: MEDICAL CENTER | Age: 74
End: 2019-07-02
Attending: INTERNAL MEDICINE
Payer: MEDICARE

## 2019-07-02 LAB
ALBUMIN SERPL BCP-MCNC: 4.2 G/DL (ref 3.2–4.9)
ALBUMIN/GLOB SERPL: 1 G/DL
ALP SERPL-CCNC: 111 U/L (ref 30–99)
ALT SERPL-CCNC: 10 U/L (ref 2–50)
ANION GAP SERPL CALC-SCNC: 9 MMOL/L (ref 0–11.9)
ANISOCYTOSIS BLD QL SMEAR: ABNORMAL
AST SERPL-CCNC: 11 U/L (ref 12–45)
BASOPHILS # BLD AUTO: 1.7 % (ref 0–1.8)
BASOPHILS # BLD: 0.19 K/UL (ref 0–0.12)
BILIRUB SERPL-MCNC: 0.4 MG/DL (ref 0.1–1.5)
BUN SERPL-MCNC: 12 MG/DL (ref 8–22)
BURR CELLS BLD QL SMEAR: NORMAL
CALCIUM SERPL-MCNC: 10.1 MG/DL (ref 8.5–10.5)
CHLORIDE SERPL-SCNC: 104 MMOL/L (ref 96–112)
CHOLEST SERPL-MCNC: 123 MG/DL (ref 100–199)
CO2 SERPL-SCNC: 23 MMOL/L (ref 20–33)
CREAT SERPL-MCNC: 0.82 MG/DL (ref 0.5–1.4)
CRP SERPL HS-MCNC: 1.29 MG/DL (ref 0–0.75)
EOSINOPHIL # BLD AUTO: 0.19 K/UL (ref 0–0.51)
EOSINOPHIL NFR BLD: 1.7 % (ref 0–6.9)
ERYTHROCYTE [DISTWIDTH] IN BLOOD BY AUTOMATED COUNT: 70.6 FL (ref 35.9–50)
ERYTHROCYTE [SEDIMENTATION RATE] IN BLOOD BY WESTERGREN METHOD: 76 MM/HOUR (ref 0–30)
GLOBULIN SER CALC-MCNC: 4.1 G/DL (ref 1.9–3.5)
GLUCOSE SERPL-MCNC: 91 MG/DL (ref 65–99)
HAV IGM SERPL QL IA: NEGATIVE
HBV CORE IGM SER QL: POSITIVE
HBV SURFACE AG SER QL: NEGATIVE
HCT VFR BLD AUTO: 40.3 % (ref 37–47)
HCV AB SER QL: NEGATIVE
HDLC SERPL-MCNC: 37 MG/DL
HGB BLD-MCNC: 12.5 G/DL (ref 12–16)
LDLC SERPL CALC-MCNC: 65 MG/DL
LYMPHOCYTES # BLD AUTO: 3.26 K/UL (ref 1–4.8)
LYMPHOCYTES NFR BLD: 29.6 % (ref 22–41)
MANUAL DIFF BLD: NORMAL
MCH RBC QN AUTO: 29.8 PG (ref 27–33)
MCHC RBC AUTO-ENTMCNC: 31 G/DL (ref 33.6–35)
MCV RBC AUTO: 96 FL (ref 81.4–97.8)
MICROCYTES BLD QL SMEAR: ABNORMAL
MONOCYTES # BLD AUTO: 1.06 K/UL (ref 0–0.85)
MONOCYTES NFR BLD AUTO: 9.6 % (ref 0–13.4)
MORPHOLOGY BLD-IMP: NORMAL
NEUTROPHILS # BLD AUTO: 6.31 K/UL (ref 2–7.15)
NEUTROPHILS NFR BLD: 57.4 % (ref 44–72)
NRBC # BLD AUTO: 0 K/UL
NRBC BLD-RTO: 0 /100 WBC
PLATELET # BLD AUTO: 349 K/UL (ref 164–446)
PLATELET BLD QL SMEAR: NORMAL
PMV BLD AUTO: 9.4 FL (ref 9–12.9)
POIKILOCYTOSIS BLD QL SMEAR: NORMAL
POTASSIUM SERPL-SCNC: 4.1 MMOL/L (ref 3.6–5.5)
PROT SERPL-MCNC: 8.3 G/DL (ref 6–8.2)
RBC # BLD AUTO: 4.2 M/UL (ref 4.2–5.4)
RBC BLD AUTO: PRESENT
RHEUMATOID FACT SER IA-ACNC: 107 IU/ML (ref 0–14)
SODIUM SERPL-SCNC: 136 MMOL/L (ref 135–145)
TRIGL SERPL-MCNC: 103 MG/DL (ref 0–149)
WBC # BLD AUTO: 11 K/UL (ref 4.8–10.8)

## 2019-07-02 PROCEDURE — 85027 COMPLETE CBC AUTOMATED: CPT

## 2019-07-02 PROCEDURE — 86140 C-REACTIVE PROTEIN: CPT

## 2019-07-02 PROCEDURE — 86480 TB TEST CELL IMMUN MEASURE: CPT

## 2019-07-02 PROCEDURE — 80061 LIPID PANEL: CPT

## 2019-07-02 PROCEDURE — 86038 ANTINUCLEAR ANTIBODIES: CPT

## 2019-07-02 PROCEDURE — 85652 RBC SED RATE AUTOMATED: CPT

## 2019-07-02 PROCEDURE — 85007 BL SMEAR W/DIFF WBC COUNT: CPT

## 2019-07-02 PROCEDURE — 86200 CCP ANTIBODY: CPT

## 2019-07-02 PROCEDURE — 36415 COLL VENOUS BLD VENIPUNCTURE: CPT

## 2019-07-02 PROCEDURE — 80074 ACUTE HEPATITIS PANEL: CPT

## 2019-07-02 PROCEDURE — 80053 COMPREHEN METABOLIC PANEL: CPT

## 2019-07-02 PROCEDURE — 86431 RHEUMATOID FACTOR QUANT: CPT

## 2019-07-03 LAB
GAMMA INTERFERON BACKGROUND BLD IA-ACNC: 0.01 IU/ML
M TB IFN-G BLD-IMP: NEGATIVE
M TB IFN-G CD4+ BCKGRND COR BLD-ACNC: 0 IU/ML
MITOGEN IGNF BCKGRD COR BLD-ACNC: >10 IU/ML
QFT TB2 - NIL TBQ2: 0 IU/ML

## 2019-07-04 LAB
CCP IGG SERPL-ACNC: 174 UNITS (ref 0–19)
NUCLEAR IGG SER QL IA: NORMAL

## 2019-07-11 ENCOUNTER — HOSPITAL ENCOUNTER (OUTPATIENT)
Dept: LAB | Facility: MEDICAL CENTER | Age: 74
End: 2019-07-11
Attending: NURSE PRACTITIONER
Payer: MEDICARE

## 2019-10-01 ENCOUNTER — HOSPITAL ENCOUNTER (OUTPATIENT)
Dept: LAB | Facility: MEDICAL CENTER | Age: 74
End: 2019-10-01
Attending: INTERNAL MEDICINE
Payer: MEDICARE

## 2019-10-01 LAB
ALBUMIN SERPL BCP-MCNC: 4.7 G/DL (ref 3.2–4.9)
ALBUMIN/GLOB SERPL: 1.6 G/DL
ALP SERPL-CCNC: 100 U/L (ref 30–99)
ALT SERPL-CCNC: 20 U/L (ref 2–50)
ANION GAP SERPL CALC-SCNC: 10 MMOL/L (ref 0–11.9)
AST SERPL-CCNC: 16 U/L (ref 12–45)
BASOPHILS # BLD AUTO: 0.9 % (ref 0–1.8)
BASOPHILS # BLD: 0.11 K/UL (ref 0–0.12)
BILIRUB SERPL-MCNC: 0.5 MG/DL (ref 0.1–1.5)
BUN SERPL-MCNC: 15 MG/DL (ref 8–22)
CALCIUM SERPL-MCNC: 9.6 MG/DL (ref 8.5–10.5)
CHLORIDE SERPL-SCNC: 109 MMOL/L (ref 96–112)
CHOLEST SERPL-MCNC: 142 MG/DL (ref 100–199)
CO2 SERPL-SCNC: 22 MMOL/L (ref 20–33)
CREAT SERPL-MCNC: 0.77 MG/DL (ref 0.5–1.4)
CRP SERPL HS-MCNC: 0.32 MG/DL (ref 0–0.75)
EOSINOPHIL # BLD AUTO: 0.16 K/UL (ref 0–0.51)
EOSINOPHIL NFR BLD: 1.3 % (ref 0–6.9)
ERYTHROCYTE [DISTWIDTH] IN BLOOD BY AUTOMATED COUNT: 60.4 FL (ref 35.9–50)
ERYTHROCYTE [SEDIMENTATION RATE] IN BLOOD BY WESTERGREN METHOD: 33 MM/HOUR (ref 0–30)
GLOBULIN SER CALC-MCNC: 2.9 G/DL (ref 1.9–3.5)
GLUCOSE SERPL-MCNC: 91 MG/DL (ref 65–99)
HBV CORE AB SERPL QL IA: NEGATIVE
HCT VFR BLD AUTO: 40.2 % (ref 37–47)
HDLC SERPL-MCNC: 45 MG/DL
HGB BLD-MCNC: 13.3 G/DL (ref 12–16)
IMM GRANULOCYTES # BLD AUTO: 0.13 K/UL (ref 0–0.11)
IMM GRANULOCYTES NFR BLD AUTO: 1.1 % (ref 0–0.9)
LDLC SERPL CALC-MCNC: 78 MG/DL
LYMPHOCYTES # BLD AUTO: 2.76 K/UL (ref 1–4.8)
LYMPHOCYTES NFR BLD: 22.8 % (ref 22–41)
MCH RBC QN AUTO: 34.6 PG (ref 27–33)
MCHC RBC AUTO-ENTMCNC: 33.1 G/DL (ref 33.6–35)
MCV RBC AUTO: 104.7 FL (ref 81.4–97.8)
MONOCYTES # BLD AUTO: 1.08 K/UL (ref 0–0.85)
MONOCYTES NFR BLD AUTO: 8.9 % (ref 0–13.4)
NEUTROPHILS # BLD AUTO: 7.85 K/UL (ref 2–7.15)
NEUTROPHILS NFR BLD: 65 % (ref 44–72)
NRBC # BLD AUTO: 0 K/UL
NRBC BLD-RTO: 0 /100 WBC
PLATELET # BLD AUTO: 302 K/UL (ref 164–446)
PMV BLD AUTO: 10 FL (ref 9–12.9)
POTASSIUM SERPL-SCNC: 4 MMOL/L (ref 3.6–5.5)
PROT SERPL-MCNC: 7.6 G/DL (ref 6–8.2)
RBC # BLD AUTO: 3.84 M/UL (ref 4.2–5.4)
SODIUM SERPL-SCNC: 141 MMOL/L (ref 135–145)
TRIGL SERPL-MCNC: 93 MG/DL (ref 0–149)
WBC # BLD AUTO: 12.1 K/UL (ref 4.8–10.8)

## 2019-10-01 PROCEDURE — 86140 C-REACTIVE PROTEIN: CPT

## 2019-10-01 PROCEDURE — 80053 COMPREHEN METABOLIC PANEL: CPT

## 2019-10-01 PROCEDURE — 87517 HEPATITIS B DNA QUANT: CPT

## 2019-10-01 PROCEDURE — 86704 HEP B CORE ANTIBODY TOTAL: CPT | Mod: GA

## 2019-10-01 PROCEDURE — 85025 COMPLETE CBC W/AUTO DIFF WBC: CPT

## 2019-10-01 PROCEDURE — 85652 RBC SED RATE AUTOMATED: CPT

## 2019-10-01 PROCEDURE — 80061 LIPID PANEL: CPT

## 2019-10-01 PROCEDURE — 36415 COLL VENOUS BLD VENIPUNCTURE: CPT

## 2019-10-03 LAB
HBV DNA SERPL NAA+PROBE-ACNC: NOT DETECTED IU/ML
HBV DNA SERPL NAA+PROBE-LOG IU: NOT DETECTED LOG IU/ML
HBV DNA SERPL QL NAA+PROBE: NOT DETECTED

## 2020-01-07 ENCOUNTER — HOSPITAL ENCOUNTER (OUTPATIENT)
Dept: LAB | Facility: MEDICAL CENTER | Age: 75
End: 2020-01-07
Attending: NURSE PRACTITIONER
Payer: MEDICARE

## 2020-01-07 LAB
ALBUMIN SERPL BCP-MCNC: 4.2 G/DL (ref 3.2–4.9)
ALBUMIN/GLOB SERPL: 1.2 G/DL
ALP SERPL-CCNC: 95 U/L (ref 30–99)
ALT SERPL-CCNC: 22 U/L (ref 2–50)
ANION GAP SERPL CALC-SCNC: 9 MMOL/L (ref 0–11.9)
AST SERPL-CCNC: 15 U/L (ref 12–45)
BASOPHILS # BLD AUTO: 0.9 % (ref 0–1.8)
BASOPHILS # BLD: 0.11 K/UL (ref 0–0.12)
BILIRUB SERPL-MCNC: 0.4 MG/DL (ref 0.1–1.5)
BUN SERPL-MCNC: 15 MG/DL (ref 8–22)
CALCIUM SERPL-MCNC: 9.4 MG/DL (ref 8.5–10.5)
CHLORIDE SERPL-SCNC: 106 MMOL/L (ref 96–112)
CO2 SERPL-SCNC: 24 MMOL/L (ref 20–33)
CREAT SERPL-MCNC: 0.79 MG/DL (ref 0.5–1.4)
CRP SERPL HS-MCNC: 0.43 MG/DL (ref 0–0.75)
EOSINOPHIL # BLD AUTO: 0.18 K/UL (ref 0–0.51)
EOSINOPHIL NFR BLD: 1.5 % (ref 0–6.9)
ERYTHROCYTE [DISTWIDTH] IN BLOOD BY AUTOMATED COUNT: 57 FL (ref 35.9–50)
ERYTHROCYTE [SEDIMENTATION RATE] IN BLOOD BY WESTERGREN METHOD: 18 MM/HOUR (ref 0–30)
GLOBULIN SER CALC-MCNC: 3.5 G/DL (ref 1.9–3.5)
GLUCOSE SERPL-MCNC: 85 MG/DL (ref 65–99)
HCT VFR BLD AUTO: 42.1 % (ref 37–47)
HGB BLD-MCNC: 13.7 G/DL (ref 12–16)
IMM GRANULOCYTES # BLD AUTO: 0.17 K/UL (ref 0–0.11)
IMM GRANULOCYTES NFR BLD AUTO: 1.4 % (ref 0–0.9)
LYMPHOCYTES # BLD AUTO: 2.23 K/UL (ref 1–4.8)
LYMPHOCYTES NFR BLD: 18.5 % (ref 22–41)
MCH RBC QN AUTO: 34.4 PG (ref 27–33)
MCHC RBC AUTO-ENTMCNC: 32.5 G/DL (ref 33.6–35)
MCV RBC AUTO: 105.8 FL (ref 81.4–97.8)
MONOCYTES # BLD AUTO: 1.27 K/UL (ref 0–0.85)
MONOCYTES NFR BLD AUTO: 10.5 % (ref 0–13.4)
NEUTROPHILS # BLD AUTO: 8.08 K/UL (ref 2–7.15)
NEUTROPHILS NFR BLD: 67.2 % (ref 44–72)
NRBC # BLD AUTO: 0 K/UL
NRBC BLD-RTO: 0 /100 WBC
PLATELET # BLD AUTO: 331 K/UL (ref 164–446)
PMV BLD AUTO: 9.6 FL (ref 9–12.9)
POTASSIUM SERPL-SCNC: 4.9 MMOL/L (ref 3.6–5.5)
PROT SERPL-MCNC: 7.7 G/DL (ref 6–8.2)
RBC # BLD AUTO: 3.98 M/UL (ref 4.2–5.4)
SODIUM SERPL-SCNC: 139 MMOL/L (ref 135–145)
WBC # BLD AUTO: 12 K/UL (ref 4.8–10.8)

## 2020-01-07 PROCEDURE — 36415 COLL VENOUS BLD VENIPUNCTURE: CPT

## 2020-01-07 PROCEDURE — 85652 RBC SED RATE AUTOMATED: CPT

## 2020-01-07 PROCEDURE — 80053 COMPREHEN METABOLIC PANEL: CPT

## 2020-01-07 PROCEDURE — 85025 COMPLETE CBC W/AUTO DIFF WBC: CPT

## 2020-01-07 PROCEDURE — 86140 C-REACTIVE PROTEIN: CPT

## 2020-01-25 ENCOUNTER — OFFICE VISIT (OUTPATIENT)
Dept: URGENT CARE | Facility: PHYSICIAN GROUP | Age: 75
End: 2020-01-25
Payer: MEDICARE

## 2020-01-25 ENCOUNTER — APPOINTMENT (OUTPATIENT)
Dept: RADIOLOGY | Facility: IMAGING CENTER | Age: 75
End: 2020-01-25
Attending: NURSE PRACTITIONER
Payer: MEDICARE

## 2020-01-25 VITALS
BODY MASS INDEX: 32.61 KG/M2 | SYSTOLIC BLOOD PRESSURE: 110 MMHG | HEART RATE: 71 BPM | OXYGEN SATURATION: 96 % | RESPIRATION RATE: 18 BRPM | WEIGHT: 191 LBS | HEIGHT: 64 IN | TEMPERATURE: 97.8 F | DIASTOLIC BLOOD PRESSURE: 52 MMHG

## 2020-01-25 DIAGNOSIS — Z87.01 HISTORY OF PNEUMONIA: ICD-10-CM

## 2020-01-25 DIAGNOSIS — R05.9 COUGH: ICD-10-CM

## 2020-01-25 DIAGNOSIS — J18.9 PNEUMONIA OF RIGHT UPPER LOBE DUE TO INFECTIOUS ORGANISM: ICD-10-CM

## 2020-01-25 PROCEDURE — 71046 X-RAY EXAM CHEST 2 VIEWS: CPT | Mod: TC | Performed by: NURSE PRACTITIONER

## 2020-01-25 PROCEDURE — 94640 AIRWAY INHALATION TREATMENT: CPT | Performed by: NURSE PRACTITIONER

## 2020-01-25 PROCEDURE — 99214 OFFICE O/P EST MOD 30 MIN: CPT | Mod: 25 | Performed by: NURSE PRACTITIONER

## 2020-01-25 RX ORDER — AMOXICILLIN AND CLAVULANATE POTASSIUM 875; 125 MG/1; MG/1
1 TABLET, FILM COATED ORAL 2 TIMES DAILY
Qty: 14 TAB | Refills: 0 | Status: SHIPPED | OUTPATIENT
Start: 2020-01-25 | End: 2020-02-01

## 2020-01-25 RX ORDER — ALBUTEROL SULFATE 90 UG/1
1-2 AEROSOL, METERED RESPIRATORY (INHALATION) EVERY 4 HOURS PRN
Qty: 1 INHALER | Refills: 0 | Status: SHIPPED | OUTPATIENT
Start: 2020-01-25

## 2020-01-25 RX ORDER — DOXYCYCLINE 100 MG/1
100 CAPSULE ORAL 2 TIMES DAILY
Qty: 14 CAP | Refills: 0 | Status: SHIPPED | OUTPATIENT
Start: 2020-01-25 | End: 2020-02-01

## 2020-01-25 RX ORDER — ALBUTEROL SULFATE 2.5 MG/3ML
2.5 SOLUTION RESPIRATORY (INHALATION) ONCE
Status: COMPLETED | OUTPATIENT
Start: 2020-01-25 | End: 2020-01-25

## 2020-01-25 RX ADMIN — ALBUTEROL SULFATE 2.5 MG: 2.5 SOLUTION RESPIRATORY (INHALATION) at 15:34

## 2020-01-25 ASSESSMENT — ENCOUNTER SYMPTOMS
COUGH: 1
SHORTNESS OF BREATH: 0
STRIDOR: 0
FEVER: 1
MYALGIAS: 0
WHEEZING: 0
EYE REDNESS: 0
CHILLS: 1
EYE DISCHARGE: 0
VOMITING: 0
SORE THROAT: 0
NAUSEA: 0
ABDOMINAL PAIN: 0
SPUTUM PRODUCTION: 1
PALPITATIONS: 0

## 2020-01-25 NOTE — PROGRESS NOTES
"Subjective:   Angela Winchester is a 74 y.o. female who presents for Cough (C/o ear fullness, cough, fever, chest congestion, RT chest discomfort while coughing x1wk)        Cough   This is a new problem. The current episode started in the past 7 days. The problem has been gradually worsening. The cough is productive of sputum, productive of brown sputum and productive of blood-tinged sputum. Associated symptoms include chest pain (When coughing), chills and a fever. Pertinent negatives include no ear pain, eye redness, myalgias, nasal congestion, postnasal drip, rash, sore throat, shortness of breath or wheezing. She has tried OTC cough suppressant for the symptoms. The treatment provided no relief. Her past medical history is significant for pneumonia. There is no history of asthma.        Review of Systems   Constitutional: Positive for chills, fever and malaise/fatigue.   HENT: Positive for congestion. Negative for ear discharge, ear pain, postnasal drip and sore throat.    Eyes: Negative for discharge and redness.   Respiratory: Positive for cough and sputum production. Negative for shortness of breath, wheezing and stridor.    Cardiovascular: Positive for chest pain (When coughing). Negative for palpitations.   Gastrointestinal: Negative for abdominal pain, nausea and vomiting.   Musculoskeletal: Negative for myalgias.   Skin: Negative for itching and rash.   All other systems reviewed and are negative.    Patient's PMH, SocHx, SurgHx, FamHx, Drug allergies and medications reviewed.     Objective:   /52 (BP Location: Right arm, Patient Position: Sitting, BP Cuff Size: Adult)   Pulse 71   Temp 36.6 °C (97.8 °F) (Temporal)   Resp 18   Ht 1.626 m (5' 4\")   Wt 86.6 kg (191 lb)   SpO2 96%   BMI 32.79 kg/m²   Physical Exam  Vitals signs reviewed.   Constitutional:       Appearance: She is well-developed.   HENT:      Head: Normocephalic.      Right Ear: Tympanic membrane and ear canal normal. No middle " ear effusion. Tympanic membrane is not perforated or erythematous.      Left Ear: Tympanic membrane and ear canal normal.  No middle ear effusion. Tympanic membrane is not perforated or erythematous.      Nose: Nose normal. No rhinorrhea.      Right Sinus: No maxillary sinus tenderness or frontal sinus tenderness.      Left Sinus: No maxillary sinus tenderness or frontal sinus tenderness.      Mouth/Throat:      Lips: Pink.      Mouth: Mucous membranes are moist.      Pharynx: Uvula midline. Oropharyngeal exudate present. No posterior oropharyngeal erythema or uvula swelling.      Tonsils: No tonsillar exudate.   Eyes:      General: Lids are normal.      Extraocular Movements: Extraocular movements intact.      Conjunctiva/sclera: Conjunctivae normal.      Pupils: Pupils are equal, round, and reactive to light.   Neck:      Musculoskeletal: Normal range of motion.      Thyroid: No thyromegaly.   Cardiovascular:      Rate and Rhythm: Normal rate and regular rhythm.      Heart sounds: Normal heart sounds.   Pulmonary:      Effort: Pulmonary effort is normal. No tachypnea, bradypnea, accessory muscle usage, prolonged expiration or respiratory distress.      Breath sounds: Examination of the right-upper field reveals wheezing. Examination of the right-lower field reveals decreased breath sounds. Examination of the left-lower field reveals decreased breath sounds. Decreased breath sounds and wheezing present.   Lymphadenopathy:      Head:      Right side of head: No submandibular or tonsillar adenopathy.      Left side of head: No submandibular or tonsillar adenopathy.   Skin:     General: Skin is warm and dry.      Findings: No rash.   Neurological:      Mental Status: She is alert and oriented to person, place, and time.   Psychiatric:         Mood and Affect: Mood normal.         Speech: Speech normal.         Behavior: Behavior normal. Behavior is cooperative.         Thought Content: Thought content normal.          "Judgment: Judgment normal.           Assessment/Plan:   Assessment    1. Cough  - DX-CHEST-2 VIEWS; Future  - albuterol (PROVENTIL) 2.5mg/3ml nebulizer solution 2.5 mg    2. History of pneumonia  - amoxicillin-clavulanate (AUGMENTIN) 875-125 MG Tab; Take 1 Tab by mouth 2 times a day for 7 days.  Dispense: 14 Tab; Refill: 0  - doxycycline (MONODOX) 100 MG capsule; Take 1 Cap by mouth 2 times a day for 7 days.  Dispense: 14 Cap; Refill: 0    3. Pneumonia of right upper lobe due to infectious organism (HCC)  - albuterol 108 (90 Base) MCG/ACT Aero Soln inhalation aerosol; Inhale 1-2 Puffs by mouth every four hours as needed for Shortness of Breath.  Dispense: 1 Inhaler; Refill: 0  - amoxicillin-clavulanate (AUGMENTIN) 875-125 MG Tab; Take 1 Tab by mouth 2 times a day for 7 days.  Dispense: 14 Tab; Refill: 0  - doxycycline (MONODOX) 100 MG capsule; Take 1 Cap by mouth 2 times a day for 7 days.  Dispense: 14 Cap; Refill: 0    Xray:\"Right upper lobe pneumonia. Follow-up after treatment to ensure resolution.\"  Albuterol breathing treatment given in office with much improvement to respiratory wheezing. Discussed proper inhaler use for at home.  I have reviewed CURB-65 criteria and patient receives 1 point at this time. Strict ER precautions discussed  Recommended Repeat Chest xray in 3-4 weeks for complete resolution.  Follow up with a PCP within 7-10 days  Use an oral probiotic daily, such as Culturelle, Align, or yogurt to reduce gastrointestinal symptoms from antibiotic use.    Differential diagnosis, natural history, supportive care, and indications for immediate follow-up discussed.     **Please note that all invasive procedures during this visit were performed by myself and/or the Medical Assistant under the supervision of the PA or MD in office**        "

## 2020-06-10 NOTE — RESPIRATORY CARE
Patient able to remove CPAP mask on her on.   Implemented All Universal Safety Interventions:  [place] to call system. Call bell, personal items and telephone within reach. Instruct patient to call for assistance. Room bathroom lighting operational. Non-slip footwear when patient is off stretcher. Physically safe environment: no spills, clutter or unnecessary equipment. Stretcher in lowest position, wheels locked, appropriate side rails in place.

## 2020-06-17 ENCOUNTER — HOSPITAL ENCOUNTER (OUTPATIENT)
Dept: LAB | Facility: MEDICAL CENTER | Age: 75
End: 2020-06-17
Attending: INTERNAL MEDICINE
Payer: MEDICARE

## 2020-06-17 LAB
ALBUMIN SERPL BCP-MCNC: 4.1 G/DL (ref 3.2–4.9)
ALBUMIN/GLOB SERPL: 1.3 G/DL
ALP SERPL-CCNC: 79 U/L (ref 30–99)
ALT SERPL-CCNC: 15 U/L (ref 2–50)
ANION GAP SERPL CALC-SCNC: 16 MMOL/L (ref 7–16)
AST SERPL-CCNC: 15 U/L (ref 12–45)
BASOPHILS # BLD AUTO: 1 % (ref 0–1.8)
BASOPHILS # BLD: 0.12 K/UL (ref 0–0.12)
BILIRUB SERPL-MCNC: 0.3 MG/DL (ref 0.1–1.5)
BUN SERPL-MCNC: 10 MG/DL (ref 8–22)
CALCIUM SERPL-MCNC: 9.5 MG/DL (ref 8.5–10.5)
CHLORIDE SERPL-SCNC: 102 MMOL/L (ref 96–112)
CO2 SERPL-SCNC: 22 MMOL/L (ref 20–33)
CREAT SERPL-MCNC: 0.8 MG/DL (ref 0.5–1.4)
CRP SERPL HS-MCNC: 0.54 MG/DL (ref 0–0.75)
EOSINOPHIL # BLD AUTO: 0.24 K/UL (ref 0–0.51)
EOSINOPHIL NFR BLD: 2 % (ref 0–6.9)
ERYTHROCYTE [DISTWIDTH] IN BLOOD BY AUTOMATED COUNT: 55 FL (ref 35.9–50)
FASTING STATUS PATIENT QL REPORTED: NORMAL
GLOBULIN SER CALC-MCNC: 3.2 G/DL (ref 1.9–3.5)
GLUCOSE SERPL-MCNC: 113 MG/DL (ref 65–99)
HCT VFR BLD AUTO: 39.4 % (ref 37–47)
HGB BLD-MCNC: 12.8 G/DL (ref 12–16)
IMM GRANULOCYTES # BLD AUTO: 0.24 K/UL (ref 0–0.11)
IMM GRANULOCYTES NFR BLD AUTO: 2 % (ref 0–0.9)
LYMPHOCYTES # BLD AUTO: 2.41 K/UL (ref 1–4.8)
LYMPHOCYTES NFR BLD: 20 % (ref 22–41)
MCH RBC QN AUTO: 33.3 PG (ref 27–33)
MCHC RBC AUTO-ENTMCNC: 32.5 G/DL (ref 33.6–35)
MCV RBC AUTO: 102.6 FL (ref 81.4–97.8)
MONOCYTES # BLD AUTO: 1.56 K/UL (ref 0–0.85)
MONOCYTES NFR BLD AUTO: 12.9 % (ref 0–13.4)
NEUTROPHILS # BLD AUTO: 7.5 K/UL (ref 2–7.15)
NEUTROPHILS NFR BLD: 62.1 % (ref 44–72)
NRBC # BLD AUTO: 0 K/UL
NRBC BLD-RTO: 0 /100 WBC
PLATELET # BLD AUTO: 323 K/UL (ref 164–446)
PMV BLD AUTO: 9.8 FL (ref 9–12.9)
POTASSIUM SERPL-SCNC: 4.7 MMOL/L (ref 3.6–5.5)
PROT SERPL-MCNC: 7.3 G/DL (ref 6–8.2)
RBC # BLD AUTO: 3.84 M/UL (ref 4.2–5.4)
SODIUM SERPL-SCNC: 140 MMOL/L (ref 135–145)
WBC # BLD AUTO: 12.1 K/UL (ref 4.8–10.8)

## 2020-06-17 PROCEDURE — 85025 COMPLETE CBC W/AUTO DIFF WBC: CPT

## 2020-06-17 PROCEDURE — 80053 COMPREHEN METABOLIC PANEL: CPT

## 2020-06-17 PROCEDURE — 86140 C-REACTIVE PROTEIN: CPT

## 2020-06-17 PROCEDURE — 85652 RBC SED RATE AUTOMATED: CPT

## 2020-06-17 PROCEDURE — 36415 COLL VENOUS BLD VENIPUNCTURE: CPT

## 2020-06-18 LAB — ERYTHROCYTE [SEDIMENTATION RATE] IN BLOOD BY WESTERGREN METHOD: 45 MM/HOUR (ref 0–30)

## 2020-09-09 ENCOUNTER — HOSPITAL ENCOUNTER (OUTPATIENT)
Dept: LAB | Facility: MEDICAL CENTER | Age: 75
End: 2020-09-09
Attending: INTERNAL MEDICINE
Payer: MEDICARE

## 2020-09-09 LAB
ALBUMIN SERPL BCP-MCNC: 4.3 G/DL (ref 3.2–4.9)
ALBUMIN/GLOB SERPL: 1.4 G/DL
ALP SERPL-CCNC: 90 U/L (ref 30–99)
ALT SERPL-CCNC: 24 U/L (ref 2–50)
ANION GAP SERPL CALC-SCNC: 16 MMOL/L (ref 7–16)
AST SERPL-CCNC: 15 U/L (ref 12–45)
BASOPHILS # BLD AUTO: 0.8 % (ref 0–1.8)
BASOPHILS # BLD: 0.08 K/UL (ref 0–0.12)
BILIRUB SERPL-MCNC: 0.4 MG/DL (ref 0.1–1.5)
BUN SERPL-MCNC: 15 MG/DL (ref 8–22)
CALCIUM SERPL-MCNC: 9.7 MG/DL (ref 8.5–10.5)
CHLORIDE SERPL-SCNC: 102 MMOL/L (ref 96–112)
CO2 SERPL-SCNC: 22 MMOL/L (ref 20–33)
CREAT SERPL-MCNC: 0.91 MG/DL (ref 0.5–1.4)
CRP SERPL HS-MCNC: 1.03 MG/DL (ref 0–0.75)
EOSINOPHIL # BLD AUTO: 0.25 K/UL (ref 0–0.51)
EOSINOPHIL NFR BLD: 2.7 % (ref 0–6.9)
ERYTHROCYTE [DISTWIDTH] IN BLOOD BY AUTOMATED COUNT: 59.8 FL (ref 35.9–50)
ERYTHROCYTE [SEDIMENTATION RATE] IN BLOOD BY WESTERGREN METHOD: 46 MM/HOUR (ref 0–30)
GLOBULIN SER CALC-MCNC: 3 G/DL (ref 1.9–3.5)
GLUCOSE SERPL-MCNC: 115 MG/DL (ref 65–99)
HCT VFR BLD AUTO: 38.9 % (ref 37–47)
HGB BLD-MCNC: 12.6 G/DL (ref 12–16)
IMM GRANULOCYTES # BLD AUTO: 0.21 K/UL (ref 0–0.11)
IMM GRANULOCYTES NFR BLD AUTO: 2.2 % (ref 0–0.9)
LYMPHOCYTES # BLD AUTO: 1.3 K/UL (ref 1–4.8)
LYMPHOCYTES NFR BLD: 13.8 % (ref 22–41)
MCH RBC QN AUTO: 33.4 PG (ref 27–33)
MCHC RBC AUTO-ENTMCNC: 32.4 G/DL (ref 33.6–35)
MCV RBC AUTO: 103.2 FL (ref 81.4–97.8)
MONOCYTES # BLD AUTO: 1.33 K/UL (ref 0–0.85)
MONOCYTES NFR BLD AUTO: 14.1 % (ref 0–13.4)
NEUTROPHILS # BLD AUTO: 6.25 K/UL (ref 2–7.15)
NEUTROPHILS NFR BLD: 66.4 % (ref 44–72)
NRBC # BLD AUTO: 0 K/UL
NRBC BLD-RTO: 0 /100 WBC
PLATELET # BLD AUTO: 297 K/UL (ref 164–446)
PMV BLD AUTO: 9.5 FL (ref 9–12.9)
POTASSIUM SERPL-SCNC: 4.6 MMOL/L (ref 3.6–5.5)
PROT SERPL-MCNC: 7.3 G/DL (ref 6–8.2)
RBC # BLD AUTO: 3.77 M/UL (ref 4.2–5.4)
SODIUM SERPL-SCNC: 140 MMOL/L (ref 135–145)
WBC # BLD AUTO: 9.4 K/UL (ref 4.8–10.8)

## 2020-09-09 PROCEDURE — 86140 C-REACTIVE PROTEIN: CPT

## 2020-09-09 PROCEDURE — 85025 COMPLETE CBC W/AUTO DIFF WBC: CPT

## 2020-09-09 PROCEDURE — 36415 COLL VENOUS BLD VENIPUNCTURE: CPT

## 2020-09-09 PROCEDURE — 80053 COMPREHEN METABOLIC PANEL: CPT

## 2020-09-09 PROCEDURE — 85652 RBC SED RATE AUTOMATED: CPT

## 2020-11-06 ENCOUNTER — HOSPITAL ENCOUNTER (OUTPATIENT)
Dept: LAB | Facility: MEDICAL CENTER | Age: 75
End: 2020-11-06
Attending: NURSE PRACTITIONER
Payer: MEDICARE

## 2020-11-06 ENCOUNTER — HOSPITAL ENCOUNTER (OUTPATIENT)
Dept: LAB | Facility: MEDICAL CENTER | Age: 75
End: 2020-11-06
Attending: INTERNAL MEDICINE
Payer: MEDICARE

## 2020-11-06 LAB
ALBUMIN SERPL BCP-MCNC: 4.6 G/DL (ref 3.2–4.9)
ALBUMIN SERPL BCP-MCNC: 4.6 G/DL (ref 3.2–4.9)
ALBUMIN/GLOB SERPL: 1.3 G/DL
ALBUMIN/GLOB SERPL: 1.4 G/DL
ALP SERPL-CCNC: 90 U/L (ref 30–99)
ALP SERPL-CCNC: 91 U/L (ref 30–99)
ALT SERPL-CCNC: 81 U/L (ref 2–50)
ALT SERPL-CCNC: 83 U/L (ref 2–50)
ANION GAP SERPL CALC-SCNC: 13 MMOL/L (ref 7–16)
ANION GAP SERPL CALC-SCNC: 15 MMOL/L (ref 7–16)
AST SERPL-CCNC: 55 U/L (ref 12–45)
AST SERPL-CCNC: 56 U/L (ref 12–45)
BASOPHILS # BLD AUTO: 0.8 % (ref 0–1.8)
BASOPHILS # BLD: 0.08 K/UL (ref 0–0.12)
BILIRUB SERPL-MCNC: 0.4 MG/DL (ref 0.1–1.5)
BILIRUB SERPL-MCNC: 0.4 MG/DL (ref 0.1–1.5)
BUN SERPL-MCNC: 14 MG/DL (ref 8–22)
BUN SERPL-MCNC: 14 MG/DL (ref 8–22)
CALCIUM SERPL-MCNC: 10 MG/DL (ref 8.5–10.5)
CALCIUM SERPL-MCNC: 9.9 MG/DL (ref 8.5–10.5)
CHLORIDE SERPL-SCNC: 101 MMOL/L (ref 96–112)
CHLORIDE SERPL-SCNC: 104 MMOL/L (ref 96–112)
CHOLEST SERPL-MCNC: 177 MG/DL (ref 100–199)
CO2 SERPL-SCNC: 22 MMOL/L (ref 20–33)
CO2 SERPL-SCNC: 23 MMOL/L (ref 20–33)
CREAT SERPL-MCNC: 0.79 MG/DL (ref 0.5–1.4)
CREAT SERPL-MCNC: 0.83 MG/DL (ref 0.5–1.4)
CRP SERPL HS-MCNC: 0.65 MG/DL (ref 0–0.75)
EOSINOPHIL # BLD AUTO: 0.11 K/UL (ref 0–0.51)
EOSINOPHIL NFR BLD: 1.1 % (ref 0–6.9)
ERYTHROCYTE [DISTWIDTH] IN BLOOD BY AUTOMATED COUNT: 58.3 FL (ref 35.9–50)
ERYTHROCYTE [SEDIMENTATION RATE] IN BLOOD BY WESTERGREN METHOD: 42 MM/HOUR (ref 0–30)
GLOBULIN SER CALC-MCNC: 3.2 G/DL (ref 1.9–3.5)
GLOBULIN SER CALC-MCNC: 3.5 G/DL (ref 1.9–3.5)
GLUCOSE SERPL-MCNC: 94 MG/DL (ref 65–99)
GLUCOSE SERPL-MCNC: 97 MG/DL (ref 65–99)
HCT VFR BLD AUTO: 42.6 % (ref 37–47)
HDLC SERPL-MCNC: 48 MG/DL
HGB BLD-MCNC: 14.1 G/DL (ref 12–16)
IMM GRANULOCYTES # BLD AUTO: 0.09 K/UL (ref 0–0.11)
IMM GRANULOCYTES NFR BLD AUTO: 0.9 % (ref 0–0.9)
LDLC SERPL CALC-MCNC: 96 MG/DL
LYMPHOCYTES # BLD AUTO: 1.93 K/UL (ref 1–4.8)
LYMPHOCYTES NFR BLD: 20.1 % (ref 22–41)
MCH RBC QN AUTO: 33.1 PG (ref 27–33)
MCHC RBC AUTO-ENTMCNC: 33.1 G/DL (ref 33.6–35)
MCV RBC AUTO: 100 FL (ref 81.4–97.8)
MONOCYTES # BLD AUTO: 0.68 K/UL (ref 0–0.85)
MONOCYTES NFR BLD AUTO: 7.1 % (ref 0–13.4)
NEUTROPHILS # BLD AUTO: 6.7 K/UL (ref 2–7.15)
NEUTROPHILS NFR BLD: 70 % (ref 44–72)
NRBC # BLD AUTO: 0 K/UL
NRBC BLD-RTO: 0 /100 WBC
PLATELET # BLD AUTO: 309 K/UL (ref 164–446)
PMV BLD AUTO: 9.4 FL (ref 9–12.9)
POTASSIUM SERPL-SCNC: 4.2 MMOL/L (ref 3.6–5.5)
POTASSIUM SERPL-SCNC: 4.4 MMOL/L (ref 3.6–5.5)
PROT SERPL-MCNC: 7.8 G/DL (ref 6–8.2)
PROT SERPL-MCNC: 8.1 G/DL (ref 6–8.2)
RBC # BLD AUTO: 4.26 M/UL (ref 4.2–5.4)
SODIUM SERPL-SCNC: 138 MMOL/L (ref 135–145)
SODIUM SERPL-SCNC: 140 MMOL/L (ref 135–145)
TRIGL SERPL-MCNC: 164 MG/DL (ref 0–149)
WBC # BLD AUTO: 9.6 K/UL (ref 4.8–10.8)

## 2020-11-06 PROCEDURE — 80053 COMPREHEN METABOLIC PANEL: CPT | Mod: 91

## 2020-11-06 PROCEDURE — 83036 HEMOGLOBIN GLYCOSYLATED A1C: CPT | Mod: GA

## 2020-11-06 PROCEDURE — 80061 LIPID PANEL: CPT

## 2020-11-06 PROCEDURE — 80053 COMPREHEN METABOLIC PANEL: CPT

## 2020-11-06 PROCEDURE — 85652 RBC SED RATE AUTOMATED: CPT

## 2020-11-06 PROCEDURE — 82306 VITAMIN D 25 HYDROXY: CPT

## 2020-11-06 PROCEDURE — 85025 COMPLETE CBC W/AUTO DIFF WBC: CPT

## 2020-11-06 PROCEDURE — 86140 C-REACTIVE PROTEIN: CPT

## 2020-11-06 PROCEDURE — 36415 COLL VENOUS BLD VENIPUNCTURE: CPT

## 2020-11-07 LAB — 25(OH)D3 SERPL-MCNC: 61 NG/ML (ref 30–100)

## 2020-11-08 LAB
EST. AVERAGE GLUCOSE BLD GHB EST-MCNC: 140 MG/DL
HBA1C MFR BLD: 6.5 % (ref 0–5.6)

## 2021-01-15 DIAGNOSIS — Z23 NEED FOR VACCINATION: ICD-10-CM

## 2021-03-03 ENCOUNTER — HOSPITAL ENCOUNTER (OUTPATIENT)
Dept: LAB | Facility: MEDICAL CENTER | Age: 76
End: 2021-03-03
Attending: NURSE PRACTITIONER
Payer: MEDICARE

## 2021-03-03 ENCOUNTER — HOSPITAL ENCOUNTER (OUTPATIENT)
Dept: LAB | Facility: MEDICAL CENTER | Age: 76
End: 2021-03-03
Attending: INTERNAL MEDICINE
Payer: MEDICARE

## 2021-03-03 LAB
ALBUMIN SERPL BCP-MCNC: 4 G/DL (ref 3.2–4.9)
ALBUMIN SERPL BCP-MCNC: 4 G/DL (ref 3.2–4.9)
ALBUMIN/GLOB SERPL: 1.3 G/DL
ALBUMIN/GLOB SERPL: 1.3 G/DL
ALP SERPL-CCNC: 87 U/L (ref 30–99)
ALP SERPL-CCNC: 88 U/L (ref 30–99)
ALT SERPL-CCNC: 28 U/L (ref 2–50)
ALT SERPL-CCNC: 31 U/L (ref 2–50)
ANION GAP SERPL CALC-SCNC: 13 MMOL/L (ref 7–16)
ANION GAP SERPL CALC-SCNC: 14 MMOL/L (ref 7–16)
AST SERPL-CCNC: 18 U/L (ref 12–45)
AST SERPL-CCNC: 19 U/L (ref 12–45)
BASOPHILS # BLD AUTO: 0.9 % (ref 0–1.8)
BASOPHILS # BLD: 0.07 K/UL (ref 0–0.12)
BILIRUB SERPL-MCNC: 0.4 MG/DL (ref 0.1–1.5)
BILIRUB SERPL-MCNC: 0.4 MG/DL (ref 0.1–1.5)
BUN SERPL-MCNC: 14 MG/DL (ref 8–22)
BUN SERPL-MCNC: 14 MG/DL (ref 8–22)
CALCIUM SERPL-MCNC: 9.3 MG/DL (ref 8.5–10.5)
CALCIUM SERPL-MCNC: 9.4 MG/DL (ref 8.5–10.5)
CHLORIDE SERPL-SCNC: 103 MMOL/L (ref 96–112)
CHLORIDE SERPL-SCNC: 106 MMOL/L (ref 96–112)
CO2 SERPL-SCNC: 22 MMOL/L (ref 20–33)
CO2 SERPL-SCNC: 23 MMOL/L (ref 20–33)
CREAT SERPL-MCNC: 0.85 MG/DL (ref 0.5–1.4)
CREAT SERPL-MCNC: 0.9 MG/DL (ref 0.5–1.4)
CRP SERPL HS-MCNC: 2.01 MG/DL (ref 0–0.75)
EOSINOPHIL # BLD AUTO: 0.23 K/UL (ref 0–0.51)
EOSINOPHIL NFR BLD: 2.8 % (ref 0–6.9)
ERYTHROCYTE [DISTWIDTH] IN BLOOD BY AUTOMATED COUNT: 60 FL (ref 35.9–50)
ERYTHROCYTE [SEDIMENTATION RATE] IN BLOOD BY WESTERGREN METHOD: 54 MM/HOUR (ref 0–30)
GLOBULIN SER CALC-MCNC: 3.2 G/DL (ref 1.9–3.5)
GLOBULIN SER CALC-MCNC: 3.2 G/DL (ref 1.9–3.5)
GLUCOSE SERPL-MCNC: 148 MG/DL (ref 65–99)
GLUCOSE SERPL-MCNC: 148 MG/DL (ref 65–99)
HCT VFR BLD AUTO: 38.4 % (ref 37–47)
HGB BLD-MCNC: 12.3 G/DL (ref 12–16)
IMM GRANULOCYTES # BLD AUTO: 0.19 K/UL (ref 0–0.11)
IMM GRANULOCYTES NFR BLD AUTO: 2.3 % (ref 0–0.9)
LYMPHOCYTES # BLD AUTO: 1.04 K/UL (ref 1–4.8)
LYMPHOCYTES NFR BLD: 12.7 % (ref 22–41)
MCH RBC QN AUTO: 32.9 PG (ref 27–33)
MCHC RBC AUTO-ENTMCNC: 32 G/DL (ref 33.6–35)
MCV RBC AUTO: 102.7 FL (ref 81.4–97.8)
MONOCYTES # BLD AUTO: 1.11 K/UL (ref 0–0.85)
MONOCYTES NFR BLD AUTO: 13.5 % (ref 0–13.4)
NEUTROPHILS # BLD AUTO: 5.56 K/UL (ref 2–7.15)
NEUTROPHILS NFR BLD: 67.8 % (ref 44–72)
NRBC # BLD AUTO: 0 K/UL
NRBC BLD-RTO: 0 /100 WBC
PLATELET # BLD AUTO: 261 K/UL (ref 164–446)
PMV BLD AUTO: 10 FL (ref 9–12.9)
POTASSIUM SERPL-SCNC: 4.4 MMOL/L (ref 3.6–5.5)
POTASSIUM SERPL-SCNC: 4.4 MMOL/L (ref 3.6–5.5)
PROT SERPL-MCNC: 7.2 G/DL (ref 6–8.2)
PROT SERPL-MCNC: 7.2 G/DL (ref 6–8.2)
RBC # BLD AUTO: 3.74 M/UL (ref 4.2–5.4)
SODIUM SERPL-SCNC: 139 MMOL/L (ref 135–145)
SODIUM SERPL-SCNC: 142 MMOL/L (ref 135–145)
WBC # BLD AUTO: 8.2 K/UL (ref 4.8–10.8)

## 2021-03-03 PROCEDURE — 36415 COLL VENOUS BLD VENIPUNCTURE: CPT

## 2021-03-03 PROCEDURE — 85652 RBC SED RATE AUTOMATED: CPT

## 2021-03-03 PROCEDURE — 80053 COMPREHEN METABOLIC PANEL: CPT

## 2021-03-03 PROCEDURE — 86140 C-REACTIVE PROTEIN: CPT

## 2021-03-03 PROCEDURE — 80053 COMPREHEN METABOLIC PANEL: CPT | Mod: 91

## 2021-03-03 PROCEDURE — 85025 COMPLETE CBC W/AUTO DIFF WBC: CPT

## 2021-06-10 ENCOUNTER — HOSPITAL ENCOUNTER (OUTPATIENT)
Dept: LAB | Facility: MEDICAL CENTER | Age: 76
End: 2021-06-10
Attending: INTERNAL MEDICINE
Payer: MEDICARE

## 2021-06-10 ENCOUNTER — HOSPITAL ENCOUNTER (OUTPATIENT)
Dept: LAB | Facility: MEDICAL CENTER | Age: 76
End: 2021-06-10
Attending: NURSE PRACTITIONER
Payer: MEDICARE

## 2021-06-10 LAB
ALBUMIN SERPL BCP-MCNC: 4 G/DL (ref 3.2–4.9)
ALBUMIN SERPL BCP-MCNC: 4.2 G/DL (ref 3.2–4.9)
ALBUMIN/GLOB SERPL: 1.2 G/DL
ALBUMIN/GLOB SERPL: 1.3 G/DL
ALP SERPL-CCNC: 76 U/L (ref 30–99)
ALP SERPL-CCNC: 77 U/L (ref 30–99)
ALT SERPL-CCNC: 34 U/L (ref 2–50)
ALT SERPL-CCNC: 36 U/L (ref 2–50)
ANION GAP SERPL CALC-SCNC: 11 MMOL/L (ref 7–16)
ANION GAP SERPL CALC-SCNC: 11 MMOL/L (ref 7–16)
AST SERPL-CCNC: 19 U/L (ref 12–45)
AST SERPL-CCNC: 20 U/L (ref 12–45)
BASOPHILS # BLD AUTO: 0.8 % (ref 0–1.8)
BASOPHILS # BLD: 0.08 K/UL (ref 0–0.12)
BILIRUB SERPL-MCNC: 0.2 MG/DL (ref 0.1–1.5)
BILIRUB SERPL-MCNC: 0.2 MG/DL (ref 0.1–1.5)
BUN SERPL-MCNC: 11 MG/DL (ref 8–22)
BUN SERPL-MCNC: 11 MG/DL (ref 8–22)
CALCIUM SERPL-MCNC: 8.9 MG/DL (ref 8.5–10.5)
CALCIUM SERPL-MCNC: 9 MG/DL (ref 8.5–10.5)
CHLORIDE SERPL-SCNC: 105 MMOL/L (ref 96–112)
CHLORIDE SERPL-SCNC: 106 MMOL/L (ref 96–112)
CHOLEST SERPL-MCNC: 146 MG/DL (ref 100–199)
CO2 SERPL-SCNC: 23 MMOL/L (ref 20–33)
CO2 SERPL-SCNC: 23 MMOL/L (ref 20–33)
CREAT SERPL-MCNC: 0.93 MG/DL (ref 0.5–1.4)
CREAT SERPL-MCNC: 1 MG/DL (ref 0.5–1.4)
CRP SERPL HS-MCNC: 0.75 MG/DL (ref 0–0.75)
EOSINOPHIL # BLD AUTO: 0.19 K/UL (ref 0–0.51)
EOSINOPHIL NFR BLD: 1.8 % (ref 0–6.9)
ERYTHROCYTE [DISTWIDTH] IN BLOOD BY AUTOMATED COUNT: 64 FL (ref 35.9–50)
ERYTHROCYTE [SEDIMENTATION RATE] IN BLOOD BY WESTERGREN METHOD: 34 MM/HOUR (ref 0–25)
FASTING STATUS PATIENT QL REPORTED: NORMAL
GLOBULIN SER CALC-MCNC: 3.2 G/DL (ref 1.9–3.5)
GLOBULIN SER CALC-MCNC: 3.4 G/DL (ref 1.9–3.5)
GLUCOSE SERPL-MCNC: 139 MG/DL (ref 65–99)
GLUCOSE SERPL-MCNC: 140 MG/DL (ref 65–99)
HCT VFR BLD AUTO: 39.7 % (ref 37–47)
HDLC SERPL-MCNC: 34 MG/DL
HGB BLD-MCNC: 12.8 G/DL (ref 12–16)
IMM GRANULOCYTES # BLD AUTO: 0.19 K/UL (ref 0–0.11)
IMM GRANULOCYTES NFR BLD AUTO: 1.8 % (ref 0–0.9)
LDLC SERPL CALC-MCNC: 87 MG/DL
LYMPHOCYTES # BLD AUTO: 1.99 K/UL (ref 1–4.8)
LYMPHOCYTES NFR BLD: 18.7 % (ref 22–41)
MCH RBC QN AUTO: 32.5 PG (ref 27–33)
MCHC RBC AUTO-ENTMCNC: 32.2 G/DL (ref 33.6–35)
MCV RBC AUTO: 100.8 FL (ref 81.4–97.8)
MONOCYTES # BLD AUTO: 1.2 K/UL (ref 0–0.85)
MONOCYTES NFR BLD AUTO: 11.3 % (ref 0–13.4)
NEUTROPHILS # BLD AUTO: 7 K/UL (ref 2–7.15)
NEUTROPHILS NFR BLD: 65.6 % (ref 44–72)
NRBC # BLD AUTO: 0 K/UL
NRBC BLD-RTO: 0 /100 WBC
PLATELET # BLD AUTO: 316 K/UL (ref 164–446)
PMV BLD AUTO: 10 FL (ref 9–12.9)
POTASSIUM SERPL-SCNC: 4.1 MMOL/L (ref 3.6–5.5)
POTASSIUM SERPL-SCNC: 4.2 MMOL/L (ref 3.6–5.5)
PROT SERPL-MCNC: 7.4 G/DL (ref 6–8.2)
PROT SERPL-MCNC: 7.4 G/DL (ref 6–8.2)
RBC # BLD AUTO: 3.94 M/UL (ref 4.2–5.4)
SODIUM SERPL-SCNC: 139 MMOL/L (ref 135–145)
SODIUM SERPL-SCNC: 140 MMOL/L (ref 135–145)
TRIGL SERPL-MCNC: 127 MG/DL (ref 0–149)
WBC # BLD AUTO: 10.7 K/UL (ref 4.8–10.8)

## 2021-06-10 PROCEDURE — 85025 COMPLETE CBC W/AUTO DIFF WBC: CPT

## 2021-06-10 PROCEDURE — 85652 RBC SED RATE AUTOMATED: CPT

## 2021-06-10 PROCEDURE — 80061 LIPID PANEL: CPT

## 2021-06-10 PROCEDURE — 80053 COMPREHEN METABOLIC PANEL: CPT

## 2021-06-10 PROCEDURE — 36415 COLL VENOUS BLD VENIPUNCTURE: CPT

## 2021-06-10 PROCEDURE — 86140 C-REACTIVE PROTEIN: CPT

## 2021-06-10 PROCEDURE — 80053 COMPREHEN METABOLIC PANEL: CPT | Mod: 91

## 2021-12-06 ENCOUNTER — TELEPHONE (OUTPATIENT)
Dept: HEALTH INFORMATION MANAGEMENT | Facility: OTHER | Age: 76
End: 2021-12-06

## 2021-12-06 ENCOUNTER — HOSPITAL ENCOUNTER (OUTPATIENT)
Dept: LAB | Facility: MEDICAL CENTER | Age: 76
End: 2021-12-06
Attending: INTERNAL MEDICINE
Payer: MEDICARE

## 2021-12-06 LAB
ALBUMIN SERPL BCP-MCNC: 4.6 G/DL (ref 3.2–4.9)
ALBUMIN/GLOB SERPL: 1.8 G/DL
ALP SERPL-CCNC: 64 U/L (ref 30–99)
ALT SERPL-CCNC: 63 U/L (ref 2–50)
ANION GAP SERPL CALC-SCNC: 13 MMOL/L (ref 7–16)
AST SERPL-CCNC: 32 U/L (ref 12–45)
BASOPHILS # BLD AUTO: 1.1 % (ref 0–1.8)
BASOPHILS # BLD: 0.12 K/UL (ref 0–0.12)
BILIRUB SERPL-MCNC: 0.5 MG/DL (ref 0.1–1.5)
BUN SERPL-MCNC: 15 MG/DL (ref 8–22)
CALCIUM SERPL-MCNC: 9.6 MG/DL (ref 8.5–10.5)
CHLORIDE SERPL-SCNC: 102 MMOL/L (ref 96–112)
CO2 SERPL-SCNC: 24 MMOL/L (ref 20–33)
CREAT SERPL-MCNC: 1.05 MG/DL (ref 0.5–1.4)
CRP SERPL HS-MCNC: 0.59 MG/DL (ref 0–0.75)
EOSINOPHIL # BLD AUTO: 0.2 K/UL (ref 0–0.51)
EOSINOPHIL NFR BLD: 1.8 % (ref 0–6.9)
ERYTHROCYTE [DISTWIDTH] IN BLOOD BY AUTOMATED COUNT: 60.8 FL (ref 35.9–50)
GLOBULIN SER CALC-MCNC: 2.5 G/DL (ref 1.9–3.5)
GLUCOSE SERPL-MCNC: 95 MG/DL (ref 65–99)
HCT VFR BLD AUTO: 39.2 % (ref 37–47)
HGB BLD-MCNC: 13.2 G/DL (ref 12–16)
IMM GRANULOCYTES # BLD AUTO: 0.13 K/UL (ref 0–0.11)
IMM GRANULOCYTES NFR BLD AUTO: 1.2 % (ref 0–0.9)
LYMPHOCYTES # BLD AUTO: 1.97 K/UL (ref 1–4.8)
LYMPHOCYTES NFR BLD: 18 % (ref 22–41)
MCH RBC QN AUTO: 34.8 PG (ref 27–33)
MCHC RBC AUTO-ENTMCNC: 33.7 G/DL (ref 33.6–35)
MCV RBC AUTO: 103.4 FL (ref 81.4–97.8)
MONOCYTES # BLD AUTO: 0.9 K/UL (ref 0–0.85)
MONOCYTES NFR BLD AUTO: 8.2 % (ref 0–13.4)
NEUTROPHILS # BLD AUTO: 7.61 K/UL (ref 2–7.15)
NEUTROPHILS NFR BLD: 69.7 % (ref 44–72)
NRBC # BLD AUTO: 0 K/UL
NRBC BLD-RTO: 0 /100 WBC
PLATELET # BLD AUTO: 301 K/UL (ref 164–446)
PMV BLD AUTO: 9.9 FL (ref 9–12.9)
POTASSIUM SERPL-SCNC: 4.6 MMOL/L (ref 3.6–5.5)
PROT SERPL-MCNC: 7.1 G/DL (ref 6–8.2)
RBC # BLD AUTO: 3.79 M/UL (ref 4.2–5.4)
SODIUM SERPL-SCNC: 139 MMOL/L (ref 135–145)
WBC # BLD AUTO: 10.9 K/UL (ref 4.8–10.8)

## 2021-12-06 PROCEDURE — 36415 COLL VENOUS BLD VENIPUNCTURE: CPT

## 2021-12-06 PROCEDURE — 85025 COMPLETE CBC W/AUTO DIFF WBC: CPT

## 2021-12-06 PROCEDURE — 80053 COMPREHEN METABOLIC PANEL: CPT

## 2021-12-06 PROCEDURE — 85652 RBC SED RATE AUTOMATED: CPT

## 2021-12-06 PROCEDURE — 86140 C-REACTIVE PROTEIN: CPT

## 2021-12-07 LAB — ERYTHROCYTE [SEDIMENTATION RATE] IN BLOOD BY WESTERGREN METHOD: 36 MM/HOUR (ref 0–25)

## 2021-12-07 NOTE — TELEPHONE ENCOUNTER
inbound call from member, she declined [facility] Start Health Assessment, since her current PCP is with [facility] and not in the Network of providers for the [place] Plan for 2022. Member stated that she will call the  back and change to another plan.

## 2022-04-04 ENCOUNTER — HOSPITAL ENCOUNTER (OUTPATIENT)
Dept: LAB | Facility: MEDICAL CENTER | Age: 77
End: 2022-04-04
Attending: INTERNAL MEDICINE
Payer: MEDICARE

## 2022-04-04 LAB
ALBUMIN SERPL BCP-MCNC: 4.6 G/DL (ref 3.2–4.9)
ALBUMIN/GLOB SERPL: 1.6 G/DL
ALP SERPL-CCNC: 62 U/L (ref 30–99)
ALT SERPL-CCNC: 131 U/L (ref 2–50)
ANION GAP SERPL CALC-SCNC: 14 MMOL/L (ref 7–16)
AST SERPL-CCNC: 59 U/L (ref 12–45)
BASOPHILS # BLD AUTO: 0.9 % (ref 0–1.8)
BASOPHILS # BLD: 0.1 K/UL (ref 0–0.12)
BILIRUB SERPL-MCNC: 0.4 MG/DL (ref 0.1–1.5)
BUN SERPL-MCNC: 13 MG/DL (ref 8–22)
CALCIUM SERPL-MCNC: 10 MG/DL (ref 8.5–10.5)
CHLORIDE SERPL-SCNC: 100 MMOL/L (ref 96–112)
CO2 SERPL-SCNC: 23 MMOL/L (ref 20–33)
CREAT SERPL-MCNC: 1 MG/DL (ref 0.5–1.4)
CRP SERPL HS-MCNC: 0.65 MG/DL (ref 0–0.75)
EOSINOPHIL # BLD AUTO: 0.15 K/UL (ref 0–0.51)
EOSINOPHIL NFR BLD: 1.4 % (ref 0–6.9)
ERYTHROCYTE [DISTWIDTH] IN BLOOD BY AUTOMATED COUNT: 60.2 FL (ref 35.9–50)
ERYTHROCYTE [SEDIMENTATION RATE] IN BLOOD BY WESTERGREN METHOD: 36 MM/HOUR (ref 0–25)
GFR SERPLBLD CREATININE-BSD FMLA CKD-EPI: 59 ML/MIN/1.73 M 2
GLOBULIN SER CALC-MCNC: 2.9 G/DL (ref 1.9–3.5)
GLUCOSE SERPL-MCNC: 100 MG/DL (ref 65–99)
HCT VFR BLD AUTO: 39.8 % (ref 37–47)
HGB BLD-MCNC: 13 G/DL (ref 12–16)
IMM GRANULOCYTES # BLD AUTO: 0.16 K/UL (ref 0–0.11)
IMM GRANULOCYTES NFR BLD AUTO: 1.5 % (ref 0–0.9)
LYMPHOCYTES # BLD AUTO: 1.88 K/UL (ref 1–4.8)
LYMPHOCYTES NFR BLD: 17.5 % (ref 22–41)
MCH RBC QN AUTO: 34.6 PG (ref 27–33)
MCHC RBC AUTO-ENTMCNC: 32.7 G/DL (ref 33.6–35)
MCV RBC AUTO: 105.9 FL (ref 81.4–97.8)
MONOCYTES # BLD AUTO: 0.79 K/UL (ref 0–0.85)
MONOCYTES NFR BLD AUTO: 7.4 % (ref 0–13.4)
NEUTROPHILS # BLD AUTO: 7.64 K/UL (ref 2–7.15)
NEUTROPHILS NFR BLD: 71.3 % (ref 44–72)
NRBC # BLD AUTO: 0 K/UL
NRBC BLD-RTO: 0 /100 WBC
PLATELET # BLD AUTO: 334 K/UL (ref 164–446)
PMV BLD AUTO: 9.9 FL (ref 9–12.9)
POTASSIUM SERPL-SCNC: 4.4 MMOL/L (ref 3.6–5.5)
PROT SERPL-MCNC: 7.5 G/DL (ref 6–8.2)
RBC # BLD AUTO: 3.76 M/UL (ref 4.2–5.4)
SODIUM SERPL-SCNC: 137 MMOL/L (ref 135–145)
WBC # BLD AUTO: 10.7 K/UL (ref 4.8–10.8)

## 2022-04-04 PROCEDURE — 36415 COLL VENOUS BLD VENIPUNCTURE: CPT

## 2022-04-04 PROCEDURE — 85652 RBC SED RATE AUTOMATED: CPT

## 2022-04-04 PROCEDURE — 80053 COMPREHEN METABOLIC PANEL: CPT

## 2022-04-04 PROCEDURE — 85025 COMPLETE CBC W/AUTO DIFF WBC: CPT

## 2022-04-04 PROCEDURE — 86140 C-REACTIVE PROTEIN: CPT

## 2022-07-07 ENCOUNTER — HOSPITAL ENCOUNTER (OUTPATIENT)
Dept: LAB | Facility: MEDICAL CENTER | Age: 77
End: 2022-07-07
Attending: INTERNAL MEDICINE
Payer: MEDICARE

## 2022-07-07 ENCOUNTER — HOSPITAL ENCOUNTER (OUTPATIENT)
Dept: LAB | Facility: MEDICAL CENTER | Age: 77
End: 2022-07-07
Attending: NURSE PRACTITIONER
Payer: MEDICARE

## 2022-07-07 LAB
25(OH)D3 SERPL-MCNC: 98 NG/ML (ref 30–100)
ALBUMIN SERPL BCP-MCNC: 4.3 G/DL (ref 3.2–4.9)
ALBUMIN SERPL BCP-MCNC: 4.6 G/DL (ref 3.2–4.9)
ALBUMIN/GLOB SERPL: 1.3 G/DL
ALBUMIN/GLOB SERPL: 1.4 G/DL
ALP SERPL-CCNC: 59 U/L (ref 30–99)
ALP SERPL-CCNC: 60 U/L (ref 30–99)
ALT SERPL-CCNC: 35 U/L (ref 2–50)
ALT SERPL-CCNC: 36 U/L (ref 2–50)
ANION GAP SERPL CALC-SCNC: 12 MMOL/L (ref 7–16)
ANION GAP SERPL CALC-SCNC: 14 MMOL/L (ref 7–16)
AST SERPL-CCNC: 29 U/L (ref 12–45)
AST SERPL-CCNC: 31 U/L (ref 12–45)
BASOPHILS # BLD AUTO: 1.2 % (ref 0–1.8)
BASOPHILS # BLD: 0.13 K/UL (ref 0–0.12)
BILIRUB SERPL-MCNC: 0.3 MG/DL (ref 0.1–1.5)
BILIRUB SERPL-MCNC: 0.3 MG/DL (ref 0.1–1.5)
BUN SERPL-MCNC: 15 MG/DL (ref 8–22)
BUN SERPL-MCNC: 15 MG/DL (ref 8–22)
CALCIUM SERPL-MCNC: 10 MG/DL (ref 8.5–10.5)
CALCIUM SERPL-MCNC: 10.1 MG/DL (ref 8.5–10.5)
CHLORIDE SERPL-SCNC: 104 MMOL/L (ref 96–112)
CHLORIDE SERPL-SCNC: 107 MMOL/L (ref 96–112)
CHOLEST SERPL-MCNC: 159 MG/DL (ref 100–199)
CO2 SERPL-SCNC: 21 MMOL/L (ref 20–33)
CO2 SERPL-SCNC: 22 MMOL/L (ref 20–33)
CREAT SERPL-MCNC: 0.95 MG/DL (ref 0.5–1.4)
CREAT SERPL-MCNC: 0.96 MG/DL (ref 0.5–1.4)
CRP SERPL HS-MCNC: <0.3 MG/DL (ref 0–0.75)
EOSINOPHIL # BLD AUTO: 0.15 K/UL (ref 0–0.51)
EOSINOPHIL NFR BLD: 1.4 % (ref 0–6.9)
ERYTHROCYTE [DISTWIDTH] IN BLOOD BY AUTOMATED COUNT: 58.6 FL (ref 35.9–50)
ERYTHROCYTE [SEDIMENTATION RATE] IN BLOOD BY WESTERGREN METHOD: 37 MM/HOUR (ref 0–25)
GFR SERPLBLD CREATININE-BSD FMLA CKD-EPI: 62 ML/MIN/1.73 M 2
GFR SERPLBLD CREATININE-BSD FMLA CKD-EPI: 63 ML/MIN/1.73 M 2
GLOBULIN SER CALC-MCNC: 3.3 G/DL (ref 1.9–3.5)
GLOBULIN SER CALC-MCNC: 3.3 G/DL (ref 1.9–3.5)
GLUCOSE SERPL-MCNC: 87 MG/DL (ref 65–99)
GLUCOSE SERPL-MCNC: 88 MG/DL (ref 65–99)
HCT VFR BLD AUTO: 40.4 % (ref 37–47)
HDLC SERPL-MCNC: 40 MG/DL
HGB BLD-MCNC: 13.3 G/DL (ref 12–16)
IMM GRANULOCYTES # BLD AUTO: 0.18 K/UL (ref 0–0.11)
IMM GRANULOCYTES NFR BLD AUTO: 1.7 % (ref 0–0.9)
LDLC SERPL CALC-MCNC: 89 MG/DL
LYMPHOCYTES # BLD AUTO: 2.42 K/UL (ref 1–4.8)
LYMPHOCYTES NFR BLD: 23.1 % (ref 22–41)
MCH RBC QN AUTO: 33.3 PG (ref 27–33)
MCHC RBC AUTO-ENTMCNC: 32.9 G/DL (ref 33.6–35)
MCV RBC AUTO: 101.3 FL (ref 81.4–97.8)
MONOCYTES # BLD AUTO: 0.98 K/UL (ref 0–0.85)
MONOCYTES NFR BLD AUTO: 9.4 % (ref 0–13.4)
NEUTROPHILS # BLD AUTO: 6.61 K/UL (ref 2–7.15)
NEUTROPHILS NFR BLD: 63.2 % (ref 44–72)
NRBC # BLD AUTO: 0 K/UL
NRBC BLD-RTO: 0 /100 WBC
PLATELET # BLD AUTO: 361 K/UL (ref 164–446)
PMV BLD AUTO: 10 FL (ref 9–12.9)
POTASSIUM SERPL-SCNC: 4.6 MMOL/L (ref 3.6–5.5)
POTASSIUM SERPL-SCNC: 4.8 MMOL/L (ref 3.6–5.5)
PROT SERPL-MCNC: 7.6 G/DL (ref 6–8.2)
PROT SERPL-MCNC: 7.9 G/DL (ref 6–8.2)
RBC # BLD AUTO: 3.99 M/UL (ref 4.2–5.4)
SODIUM SERPL-SCNC: 139 MMOL/L (ref 135–145)
SODIUM SERPL-SCNC: 141 MMOL/L (ref 135–145)
TRIGL SERPL-MCNC: 149 MG/DL (ref 0–149)
WBC # BLD AUTO: 10.5 K/UL (ref 4.8–10.8)

## 2022-07-07 PROCEDURE — 80061 LIPID PANEL: CPT

## 2022-07-07 PROCEDURE — 85025 COMPLETE CBC W/AUTO DIFF WBC: CPT

## 2022-07-07 PROCEDURE — 80053 COMPREHEN METABOLIC PANEL: CPT

## 2022-07-07 PROCEDURE — 80053 COMPREHEN METABOLIC PANEL: CPT | Mod: 91

## 2022-07-07 PROCEDURE — 82306 VITAMIN D 25 HYDROXY: CPT

## 2022-07-07 PROCEDURE — 36415 COLL VENOUS BLD VENIPUNCTURE: CPT

## 2022-07-07 PROCEDURE — 85652 RBC SED RATE AUTOMATED: CPT

## 2022-07-07 PROCEDURE — 86140 C-REACTIVE PROTEIN: CPT

## 2022-08-18 ENCOUNTER — TELEPHONE (OUTPATIENT)
Dept: RHEUMATOLOGY | Facility: MEDICAL CENTER | Age: 77
End: 2022-08-18

## 2022-08-18 ENCOUNTER — OFFICE VISIT (OUTPATIENT)
Dept: RHEUMATOLOGY | Facility: MEDICAL CENTER | Age: 77
End: 2022-08-18
Attending: STUDENT IN AN ORGANIZED HEALTH CARE EDUCATION/TRAINING PROGRAM
Payer: MEDICARE

## 2022-08-18 VITALS
DIASTOLIC BLOOD PRESSURE: 54 MMHG | RESPIRATION RATE: 16 BRPM | OXYGEN SATURATION: 97 % | HEART RATE: 87 BPM | WEIGHT: 193.8 LBS | SYSTOLIC BLOOD PRESSURE: 118 MMHG | BODY MASS INDEX: 33.09 KG/M2 | TEMPERATURE: 98.8 F | HEIGHT: 64 IN

## 2022-08-18 DIAGNOSIS — Z79.899 HIGH RISK MEDICATION USE: ICD-10-CM

## 2022-08-18 DIAGNOSIS — M15.9 PRIMARY OSTEOARTHRITIS INVOLVING MULTIPLE JOINTS: ICD-10-CM

## 2022-08-18 DIAGNOSIS — M05.9 SEROPOSITIVE RHEUMATOID ARTHRITIS (HCC): ICD-10-CM

## 2022-08-18 PROCEDURE — 99212 OFFICE O/P EST SF 10 MIN: CPT | Performed by: STUDENT IN AN ORGANIZED HEALTH CARE EDUCATION/TRAINING PROGRAM

## 2022-08-18 PROCEDURE — 99204 OFFICE O/P NEW MOD 45 MIN: CPT | Performed by: STUDENT IN AN ORGANIZED HEALTH CARE EDUCATION/TRAINING PROGRAM

## 2022-08-18 RX ORDER — SULFASALAZINE 500 MG/1
500 TABLET, DELAYED RELEASE ORAL 2 TIMES DAILY WITH MEALS
Qty: 180 TABLET | Refills: 3 | Status: SHIPPED | OUTPATIENT
Start: 2022-08-18 | End: 2023-08-01

## 2022-08-18 RX ORDER — POTASSIUM CHLORIDE 750 MG/1
10 TABLET, FILM COATED, EXTENDED RELEASE ORAL DAILY
COMMUNITY
Start: 2022-08-02

## 2022-08-18 RX ORDER — TOFACITINIB 11 MG/1
1 TABLET, FILM COATED, EXTENDED RELEASE ORAL DAILY
Qty: 60 TABLET | Refills: 5 | Status: SHIPPED | OUTPATIENT
Start: 2022-08-18 | End: 2023-08-22

## 2022-08-18 RX ORDER — TOFACITINIB 11 MG/1
TABLET, FILM COATED, EXTENDED RELEASE ORAL
COMMUNITY
Start: 2022-06-09 | End: 2022-08-18 | Stop reason: SDUPTHER

## 2022-08-18 RX ORDER — FUROSEMIDE 20 MG/1
TABLET ORAL
COMMUNITY
Start: 2022-08-02

## 2022-08-18 RX ORDER — METHOTREXATE 2.5 MG/1
TABLET ORAL
COMMUNITY
Start: 2022-07-13 | End: 2022-09-15

## 2022-08-18 ASSESSMENT — FIBROSIS 4 INDEX: FIB4 SCORE: 1.06

## 2022-08-18 NOTE — TELEPHONE ENCOUNTER
Received request and beginning investigation for Xeljanz XR.    No PA required, copay $38. LVM for patient.

## 2022-08-18 NOTE — PATIENT INSTRUCTIONS
AFTER VISIT INSTRUCTIONS    Below are important information to help you navigate your healthcare needs and help us serve you safely and effectively:  If laboratory tests and/or imaging studies were ordered, remember to go get them done.  If new prescriptions or refills were sent to the pharmacy, remember to go pick them up.  Take your medications exactly as prescribed unless instructed otherwise.  If there are significant findings on your lab tests and imaging studies that warrant further action, I will notify you with explanations via Si[employer] Systemshart or phone call, otherwise you can view them on Tephat and let me know if you have any questions.  Sign up for [employer] if you have not already done so, in order to have access to the results of your lab tests and imaging studies, and to be able to send and receive messages from me.  Note that [employer] messages are typically read during office hours only and may take 1-7 days before a response depending on the urgency of the situation and how busy my schedule is.  In general, [employer]Point messaging is for non-urgent matters that do not require immediate attention, so for urgent matters that cannot wait, you are advised to go to an urgent care.

## 2022-08-18 NOTE — PROGRESS NOTES
[facility]  [address], [address], [place] [address]  Phone: [phone] / Fax: [phone]    RHEUMATOLOGY NEW PATIENT VISIT NOTE      DATE OF SERVICE: 08/18/2022    REFERRING PROVIDER:  Conor Hull M.D.  [address] [facility] [facility]  [place]  [place] [id number]    MEDICAL RECORD NUMBER:  [id number]      SUBJECTIVE:     CHIEF COMPLAINT:   Chief Complaint   Patient presents with    New Patient     Establish Care for Rheumatoid Arthritis       HISTORY OF PRESENT ILLNESS:  Angela Winchester is a 76 y.o. female with pertinent history notable for seropositive rheumatoid arthritis diagnosed in 2011 (symptomatic from 2009), osteoarthritis of multiple joints (hands, shoulders, knees) s/p bilateral knee replacements, and DJD of cervical/thoracic spine. Previously under the care of a local rheumatologist who is retiring (Dr. Conor Hull), she presents to establish care for her condition. Recent oral surgery in 5/2022 for which her DMARDs were held for almost a month prior, so had a flare for which Dr. Hull prescribed a 10-day course of prednisone 20mg taper which was very helpful. Reports residual but tolerable joint/muscle pain in her hands, wrists, left shoulder, neck, lower back, knees, ankles and feet. These are associated with up to 1 hour of morning stiffness that improves with activity but her joint pain tends to worsen with activity over the course of the day. Notes that she has tried Voltaren gel and has had steroid injections to her left shoulder and both knees in the past which were not helpful. Notes that orthopedic surgery recommended left shoulder replacement but she is not inclined to undergoing that.    Pertinent treatment history as of 8/2022: Prednisone taper, Remicade (discontinued after 3 infusions due to ineffectiveness), Humira (effective for 8 years, then became ineffective), methotrexate (-8/2022), Xeljanz (-present).  Pertinent lab results as of 7/2022: Strongly positive   and ACPA 174 (in 7/2019), elevated ESR 37 with normal CRP, elevated .3 with low RBC 3.99; normal CMP, vitamin D, and lipid panel.    REVIEW OF SYSTEMS:  Except as noted in the history above, a complete review of systems with emphasis on autoimmune inflammatory conditions was otherwise negative for any significant symptoms.      ACTIVE PROBLEM LIST:  There is no problem list on file for this patient.  No problem-specific Assessment & Plan notes found for this encounter.      PAST MEDICAL HISTORY:  Past Medical History:   Diagnosis Date    Arthritis     Breath shortness     with exertion    Bronchitis 01/2018    Cancer (HCC) 2009    LUNG CANCER= 8/13/2018 pt states she had lung resection and it was fungal infection, no cancer; Skin    Cataract     Emphysema of lung ([facility])     High cholesterol     Hypertension     Pain     knee/hand    Pneumonia 02/2018    RA (rheumatoid arthritis) ([facility])     Sleep apnea     CPAP with 2.5 liters oxygen    Snoring        PAST SURGICAL HISTORY:  Past Surgical History:   Procedure Laterality Date    KNEE ARTHROPLASTY TOTAL Right 8/27/2018    Procedure: KNEE ARTHROPLASTY TOTAL;  Surgeon: Fabian Kennedy M.D.;  Location: SURGERY [facility];  Service: Orthopedics    TIBIAL OSTEOTOMY Right 8/27/2018    Procedure: TIBIAL OSTEOTOMY - POSS TUBERCLE;  Surgeon: Fabian Kennedy M.D.;  Location: [facility];  Service: Orthopedics    CATARACT PHACO WITH IOL  7/1/2013    Performed by Tyron Quiles M.D. at SURGERY [facility]    CATARACT PHACO WITH IOL  6/17/2013    Performed by Tyron Quiles M.D. at [facility]    CARPAL TUNNEL ENDOSCOPIC  2/5/2010    Performed by EREN KOVACS at SURGERY SAME DAY [facility] ORS    TUMOR EXCISION WITH BIOPSY Left 2010    left wrist for skin cancer, done in the office    THORACOSCOPY  6/8/2009    Performed by GANSER, JOHN H at SURGERY [facility] ORS       MEDICATIONS:  Current Outpatient Medications    Medication Sig Dispense Refill    potassium chloride ER (KLOR-CON) 10 MEQ tablet Take 10 mEq by mouth every day.      methotrexate 2.5 MG tablet       furosemide (LASIX) 20 MG Tab TAKE 1/2 TO 1 TABLET BY MOUTH DAILY      metFORMIN (GLUCOPHAGE) 500 MG Tab Take 500 mg by mouth 2 times a day.      TRELEGY ELLIPTA 200-62.5-25 MCG/INH AEROSOL POWDER, BREATH ACTIVATED       Tofacitinib Citrate ER (XELJANZ XR) 11 MG TABLET SR 24 HR Take 1 Tablet by mouth every day. 60 Tablet 5    sulfaSALAzine (AZULFIDINE EN-TAB) 500 MG EC tablet Take 1 Tablet by mouth 2 times a day with meals. 180 Tablet 3    albuterol 108 (90 Base) MCG/ACT Aero Soln inhalation aerosol Inhale 1-2 Puffs by mouth every four hours as needed for Shortness of Breath. 1 Inhaler 0    Cholecalciferol (VITAMIN D3) 5000 units Cap Take 1 Cap by mouth every day.      losartan (COZAAR) 100 MG Tab Take 100 mg by mouth every day.      metoprolol SR (TOPROL XL) 25 MG TABLET SR 24 HR Take 25 mg by mouth every day.      flunisolide, NASAL, (NASAREL) 29 MCG/ACT (0.025%) SOLN Spray 2 Sprays in nose 2 Times a Day. 1 Bottle 0    amlodipine (NORVASC) 5 MG TABS Take 5 mg by mouth every day.      ALPRAZolam (XANAX) 0.5 MG Tab Take 0.5 mg by mouth at bedtime as needed for Sleep.      atorvastatin (LIPITOR) 20 MG Tab Take 20 mg by mouth every evening.      folic acid (FOLVITE) 1 MG TABS Take 1 mg by mouth every day.      hydrocodone-acetaminophen (MAXIDONE)  MG per tablet Take 1-2 Tabs by mouth every 6 hours as needed for Mild Pain.       No current facility-administered medications for this visit.       ALLERGIES:   No Known Allergies    IMMUNIZATIONS:  Immunization History   Administered Date(s) Administered    Deepak SARS-CoV-2 Vaccine 03/21/2021, 11/03/2021       SOCIAL HISTORY:   Social History     Tobacco Use    Smoking status: Every Day     Packs/day: 2.00     Years: 42.00     Pack years: 84.00     Types: Cigarettes    Smokeless tobacco: Never    Tobacco comments:  "    2-3 ppd; 8/13/18 currently 0.5ppd   Vaping Use    Vaping Use: Unknown   Substance Use Topics    Alcohol use: No     Alcohol/week: 0.0 oz    Drug use: No       FAMILY HISTORY:  History reviewed. No pertinent family history.     OBJECTIVE:     Vital Signs: /54 (BP Location: Right arm, Patient Position: Sitting, BP Cuff Size: Adult)   Pulse 87   Temp 37.1 °C (98.8 °F) (Temporal)   Resp 16   Ht 1.626 m (5' 4\")   Wt 87.9 kg (193 lb 12.8 oz)   SpO2 97% Body mass index is 33.27 kg/m².    General: Appears well and comfortable  Eyes: No scleral or conjunctival lesions  ENT: No apparent oral or nasal lesions  Head/Neck: No apparent scalp or neck lesions  Cardiovascular: Regular rate and rhythm; no pericardial rubs  Respiratory: Breathing quiet and unlabored; no rales or pleural rubs  Gastrointestinal: No organomegaly or abdominal masses  Integumentary: No significant cutaneous lesions or discolorations  Musculoskeletal: Mild tenderness of hands (on MCP squeeze) with puffiness, wrists (right > left), and left shoulder AC/GH joints with severe restriction in range of motion  Neurologic: No focal sensory or motor deficits  Psychiatric: Mood and affect appropriate      LABORATORY RESULTS REVIEWED AND INTERPRETED BY ME:  Lab Results   Component Value Date/Time    SEDRATEWES 37 (H) 07/07/2022 02:43 PM    CREACTPROT <0.30 07/07/2022 02:43 PM    URICACID 6.3 03/20/2012 02:49 PM     Lab Results   Component Value Date/Time    RHEUMFACTN 107 (H) 07/02/2019 01:13 PM    CCPANTIBODY 174 (H) 07/02/2019 01:13 PM     Lab Results   Component Value Date/Time    ANTINUCAB None Detected 07/02/2019 01:13 PM     Lab Results   Component Value Date/Time    PROTHROMBTM 10.6 06/04/2009 04:50 PM    INR 0.91 06/04/2009 04:50 PM     Lab Results   Component Value Date/Time    TSHULTRASEN 3.050 01/14/2019 02:03 PM    FREET4 1.24 03/20/2012 02:49 PM     Lab Results   Component Value Date/Time    HEPBSAG Negative 07/02/2019 01:13 PM    " HEPBCORIGM Positive (A) 07/02/2019 01:13 PM    HEPBCORTOT Negative 10/01/2019 01:48 PM    HEPCAB Negative 07/02/2019 01:13 PM     Lab Results   Component Value Date/Time    ASTSGOT 31 07/07/2022 02:45 PM    ALTSGPT 36 07/07/2022 02:45 PM    ALKPHOSPHAT 60 07/07/2022 02:45 PM    TBILIRUBIN 0.3 07/07/2022 02:45 PM    TOTPROTEIN 7.6 07/07/2022 02:45 PM    ALBUMIN 4.3 07/07/2022 02:45 PM     Lab Results   Component Value Date/Time    SODIUM 139 07/07/2022 02:45 PM    POTASSIUM 4.6 07/07/2022 02:45 PM    CHLORIDE 104 07/07/2022 02:45 PM    CO2 21 07/07/2022 02:45 PM    GLUCOSE 88 07/07/2022 02:45 PM    BUN 15 07/07/2022 02:45 PM    CREATININE 0.95 07/07/2022 02:45 PM    CALCIUM 10.1 07/07/2022 02:45 PM    MAGNESIUM 1.9 03/31/2016 01:04 PM     Lab Results   Component Value Date/Time    WBC 10.5 07/07/2022 02:43 PM    RBC 3.99 (L) 07/07/2022 02:43 PM    HEMOGLOBIN 13.3 07/07/2022 02:43 PM    HEMATOCRIT 40.4 07/07/2022 02:43 PM    .3 (H) 07/07/2022 02:43 PM    MCH 33.3 (H) 07/07/2022 02:43 PM    MCHC 32.9 (L) 07/07/2022 02:43 PM    RDW 58.6 (H) 07/07/2022 02:43 PM    PLATELETCT 361 07/07/2022 02:43 PM    MPV 10.0 07/07/2022 02:43 PM    NEUTS 6.61 07/07/2022 02:43 PM    LYMPHOCYTES 23.10 07/07/2022 02:43 PM    MONOCYTES 9.40 07/07/2022 02:43 PM    EOSINOPHILS 1.40 07/07/2022 02:43 PM    BASOPHILS 1.20 07/07/2022 02:43 PM    ANISOCYTOSIS 1+ 07/02/2019 01:13 PM     Lab Results   Component Value Date/Time    FERRITIN 175.8 03/31/2016 01:04 PM    IRON 64 03/31/2016 01:04 PM    TRANSFERRIN 216 03/31/2016 01:05 PM    FOLATE >20.00 09/08/2011 11:11 AM     Lab Results   Component Value Date/Time    25HYDROXY 98 07/07/2022 02:45 PM     Lab Results   Component Value Date/Time    COLORURINE DK Yellow 08/13/2018 01:40 PM    SPECGRAVITY 1.021 08/13/2018 01:40 PM    PHURINE 6.0 08/13/2018 01:40 PM    GLUCOSEUR Negative 08/13/2018 01:40 PM    KETONES Negative 08/13/2018 01:40 PM    PROTEINURIN 30 (A) 08/13/2018 01:40 PM     Lab  Results   Component Value Date/Time    CHOLSTRLTOT 159 07/07/2022 02:45 PM    LDL 89 07/07/2022 02:45 PM    HDL 40 07/07/2022 02:45 PM    TRIGLYCERIDE 149 07/07/2022 02:45 PM    HBA1C 6.5 (H) 11/06/2020 03:19 PM       RADIOLOGY RESULTS REVIEWED AND INTERPRETED BY ME:  Results for orders placed during the hospital encounter of 11/20/06    DX-KNEES-AP BILATERAL STANDING    Impression  IMPRESSION:    MILD SYMMETRIC MEDIAL COMPARTMENT FEMOROTIBIAL OSTEOARTHROSIS.  NO  EVIDENCE OF ANY INFLAMMATORY  PROCESS.    Results for orders placed during the hospital encounter of 11/22/11    DX-FOOT-COMPLETE 3+    Impression  1. No no fracture or malalignment.    2. Chronic changes as described in the findings section.    Results for orders placed during the hospital encounter of 11/22/11    DX-ANKLE 3+ VIEWS    Impression  Negative right ankle series.  Diffuse soft tissue swelling.    Results for orders placed during the hospital encounter of 02/14/08    DX-KNEE COMPLETE 4+    Impression  IMPRESSION:    1. NO ACUTE FRACTURE IDENTIFIED.    2. SMALL KNEE JOINT EFFUSION.    3. PERIARTICULAR DEOSSIFICATION AND MILD OSTEOARTHROSIS.    Results for orders placed during the hospital encounter of 08/27/18    DX-KNEE 2- RIGHT    Impression  1. Status post right total knee replacement without evidence of hardware complication.    Results for orders placed during the hospital encounter of 11/20/06    DX-JOINT SURVEY-FEET SINGLE VIEW    Impression  IMPRESSION:    1. NO EVIDENCE OF INFLAMMATORY ARTHROPATHY.    2. BLAND DEGENERATIVE ARTHROSIS POLYARTICULAR.    Results for orders placed in visit on 08/01/17    DX-HAND 3+ RIGHT    Impression  Osteoarthritis affecting the interphalangeal joints with joint space narrowing, subchondral sclerosis and osteophyte formation.    No fracture or acute bony abnormality.    Results for orders placed during the hospital encounter of 06/03/19    DX-HAND 2- LEFT    Impression  1.  Moderate osteoarthritis.  2.  No  evidence for inflammatory arthropathy.  3.  Evidence of old trauma to the distal radial metaphysis.    Results for orders placed during the hospital encounter of 11/20/06    DX-JOINT SURVEY-HANDS SINGLE VIEW    Impression  IMPRESSION:    BLAND DEGENERATIVE OSTEOARTHROSIS OF THE INTERPHALANGEAL JOINTS AND THUMB  BASE.  NO EVIDENCE OF INFLAMMATORY ARTHRITIS.    Results for orders placed during the hospital encounter of 06/03/19    DX-SHOULDER 2+ LEFT    Impression  1.  Severe degenerative change of LEFT shoulder with probable associated joint body.  2.  No fracture or dislocation.    Results for orders placed during the hospital encounter of 04/26/10    DS-BONE DENSITY STUDY (DEXA)    Impression  IMPRESSION:    ACCORDING TO THE WORLD HEALTH ORGANIZATION CLASSIFICATION, BONE MINERAL  DENSITY OF THIS PATIENT IS OSTEOPENIC.    Results for orders placed during the hospital encounter of 04/22/09    CT-CHEST (THORAX) W/O    Impression  IMPRESSION:    1. 11 MM NONCALCIFIED SPICULATED SOFT TISSUE MASS ANTERIORLY IN THE RIGHT  LUNG APEX (IMAGE 30 ).  THIS IS SUSPICIOUS FOR BRONCHOGENIC  CARCINOMA.  GIVEN ITS SIZE, IT WOULD LIKELY BE SENSITIVE TO FDG IMAGING  IF METABOLICALLY ACTIVE, AS A CANCER WOULD BE EXPECTED TO BE.    2. SMALL NONSPECIFIC MEDIASTINAL ADENOPATHY.    3. EVIDENCE OF PRIOR GRANULOMATOUS EXPOSURE.    Results for orders placed during the hospital encounter of 04/30/09    MR-KNEE-W/O    Impression  IMPRESSION:    1. MACERATED-TYPE TEAR SEEN INVOLVING THE POSTERIOR HORN AND BODY OF THE  LATERAL MENISCUS UNLESS THE PATIENT HAS HAD PRIOR PARTIAL MENISCECTOMY.    2. CHRONIC MILD SPRAIN TO THE ANTERIOR AND POSTERIOR CRUCIATE LIGAMENTS.    3. TRICOMPARTMENT DEGENERATIVE CHANGE WHICH APPEARS TO INVOLVE THE  PATELLOFEMORAL AND THE LATERAL FEMOROTIBIAL ARTICULATIONS TO GREATEST  DEGREE.  SOME DEGENERATIVE CHANGE IS ALSO SEEN INVOLVING THE PROXIMAL  TIBIOFIBULAR ARTICULATION.    4. MODERATE-SIZED JOINT EFFUSION WITH  EXTENSIVE SYNOVITIS.    5. LIKELY INTRAARTICULAR OSSIFIC LOOSE BODY SEEN ADJACENT TO THE PROXIMAL  TIBIA POSTERIORLY.    6. MULTIPLE LOW SIGNAL AREAS SEEN WITHIN A MODERATE-SIZED BAKER CYST  WHICH MAY REPRESENT EITHER SYNOVITIS OR EXTRUDED INTRAARTICULAR LOOSE  BODIES.      All relevant laboratory and imaging results reported on this note were reviewed and interpreted by me.      ASSESSMENT AND PLAN:     Angela Winchester is a 76 y.o. female with history as noted above whose presentation merits the following diagnostic and clinical status impressions and recommendations:    1. Seropositive rheumatoid arthritis (HCC)  Clinical picture suggests relatively low disease activity on the current regimen, so no need for escalation of treatment at this time. However, given her persistent macrocytosis despite low-dose methotrexate 10 mg weekly along with folic acid 1 mg daily, would switch to sulfasalazine. Given the possibility of discordance between immunologic activity and clinical disease manifestations, need to routinely check laboratory markers of disease activity for complete assessment.  - Tofacitinib Citrate ER (XELJANZ XR) 11 MG TABLET SR 24 HR; Take 1 Tablet by mouth every day.  Dispense: 60 Tablet; Refill: 5  - sulfaSALAzine (AZULFIDINE EN-TAB) 500 MG EC tablet; Take 1 Tablet by mouth 2 times a day with meals.  Dispense: 180 Tablet; Refill: 3  - Stop methotrexate  - Sed Rate; Future  - CRP QUANTITIVE (NON-CARDIAC); Future    2. Primary osteoarthritis involving multiple joints  Presumably a significant contributor to overall joint pain burden which can be managed with topical NSAIDs and analgesics.  - Consider another trial of intra-articular steroid injection to left shoulder if needed    3. High risk medication use  Given the hematologic adverse effects of methotrexate as noted above, would discontinue this medication.  - CBC WITH DIFFERENTIAL; Future  - Comp Metabolic Panel; Future      FOLLOW-UP: Return in  about 3 months (around 11/18/2022) for Short.           Thank you for giving me the opportunity to participate in the care of Angela Winchester.    Basil Ren MD, MS  Rheumatologist, [facility]  , Department of Internal Medicine  [facility]

## 2022-09-01 ENCOUNTER — OFFICE VISIT (OUTPATIENT)
Dept: URGENT CARE | Facility: PHYSICIAN GROUP | Age: 77
End: 2022-09-01
Payer: MEDICARE

## 2022-09-01 VITALS
BODY MASS INDEX: 31.58 KG/M2 | SYSTOLIC BLOOD PRESSURE: 122 MMHG | TEMPERATURE: 98.2 F | HEIGHT: 64 IN | WEIGHT: 185 LBS | OXYGEN SATURATION: 91 % | HEART RATE: 91 BPM | DIASTOLIC BLOOD PRESSURE: 68 MMHG | RESPIRATION RATE: 24 BRPM

## 2022-09-01 DIAGNOSIS — J44.1 COPD EXACERBATION (HCC): ICD-10-CM

## 2022-09-01 DIAGNOSIS — J02.9 SORE THROAT: ICD-10-CM

## 2022-09-01 LAB
INT CON NEG: NEGATIVE
INT CON POS: POSITIVE
S PYO AG THROAT QL: NEGATIVE

## 2022-09-01 PROCEDURE — 87880 STREP A ASSAY W/OPTIC: CPT

## 2022-09-01 PROCEDURE — 99214 OFFICE O/P EST MOD 30 MIN: CPT

## 2022-09-01 RX ORDER — BENZONATATE 100 MG/1
100 CAPSULE ORAL 3 TIMES DAILY PRN
Qty: 60 CAPSULE | Refills: 0 | Status: SHIPPED | OUTPATIENT
Start: 2022-09-01 | End: 2023-04-20

## 2022-09-01 RX ORDER — PREDNISONE 10 MG/1
TABLET ORAL
Qty: 18 TABLET | Refills: 0 | Status: SHIPPED | OUTPATIENT
Start: 2022-09-01 | End: 2022-12-22

## 2022-09-01 ASSESSMENT — ENCOUNTER SYMPTOMS
NAUSEA: 0
SPUTUM PRODUCTION: 1
WHEEZING: 0
MYALGIAS: 1
COUGH: 1
CHILLS: 0
DIARRHEA: 0
FEVER: 1
SHORTNESS OF BREATH: 1
VOMITING: 0
SORE THROAT: 1

## 2022-09-01 ASSESSMENT — FIBROSIS 4 INDEX: FIB4 SCORE: 1.06

## 2022-09-01 NOTE — PROGRESS NOTES
Subjective     Angela Winchester is a 76 y.o. female who presents with Cough (Wet- 6x days), Shortness of Breath (6x days), and Pharyngitis (3x days )            HPI    Patient presents with symptoms that started 6 days ago.  She reports fever, with temperature as high as 101°F.  She further endorses fatigue, bilateral ear fullness, rhinorrhea, nasal congestion, sore throat, and body aches.  Patient reported testing negative twice for COVID at home.  Patient has COPD, reports compliance with her current regimen.  She uses oxygen 2.5 L at night.  Patient reports worsening productive cough.  She further reports worsening shortness of breath.  Patient reports that she is COVID vaccinated.    Patient's current problem list, medications, and past medical/surgical history were reviewed in Epic.    PMH:  has a past medical history of Arthritis, Breath shortness, Bronchitis (01/2018), Cancer (HCC) (2009), Cataract, Emphysema of lung ([facility]), High cholesterol, Hypertension, Pain, Pneumonia (02/2018), RA (rheumatoid arthritis) ([facility]), Sleep apnea, and Snoring.    She has no past medical history of Anginal syndrome ([facility]), Arrhythmia, Back pain, Blood clotting disorder ([facility]), Congestive heart failure ([facility]), Diabetes ([facility]), Dialysis patient ([facility]), Disorder of thyroid, Fall, Glaucoma, Gynecological disorder, Heart murmur, Heart valve disease, Hemorrhagic disorder ([facility]), Indigestion, Infectious disease, Jaundice, Myocardial infarct ([facility]), Pacemaker, Psychiatric problem, Renal disorder, Rheumatic fever, Seizure ([facility]), Stroke ([facility]), or Urinary incontinence.  MEDS:   Current Outpatient Medications:     potassium chloride ER (KLOR-CON) 10 MEQ tablet, Take 10 mEq by mouth every day., Disp: , Rfl:     methotrexate 2.5 MG tablet, , Disp: , Rfl:     furosemide (LASIX) 20 MG Tab, TAKE 1/2 TO 1 TABLET BY MOUTH DAILY, Disp: , Rfl:     metFORMIN (GLUCOPHAGE) 500 MG Tab, Take 500 mg by mouth 2 times a day., Disp: , Rfl:     TRELEGY ELLIPTA  200-62.5-25 MCG/INH AEROSOL POWDER, BREATH ACTIVATED, , Disp: , Rfl:     Tofacitinib Citrate ER (XELJANZ XR) 11 MG TABLET SR 24 HR, Take 1 Tablet by mouth every day., Disp: 60 Tablet, Rfl: 5    sulfaSALAzine (AZULFIDINE EN-TAB) 500 MG EC tablet, Take 1 Tablet by mouth 2 times a day with meals., Disp: 180 Tablet, Rfl: 3    albuterol 108 (90 Base) MCG/ACT Aero Soln inhalation aerosol, Inhale 1-2 Puffs by mouth every four hours as needed for Shortness of Breath., Disp: 1 Inhaler, Rfl: 0    Cholecalciferol (VITAMIN D3) 5000 units Cap, Take 1 Cap by mouth every day., Disp: , Rfl:     losartan (COZAAR) 100 MG Tab, Take 100 mg by mouth every day., Disp: , Rfl:     metoprolol SR (TOPROL XL) 25 MG TABLET SR 24 HR, Take 25 mg by mouth every day., Disp: , Rfl:     flunisolide, NASAL, (NASAREL) 29 MCG/ACT (0.025%) SOLN, Spray 2 Sprays in nose 2 Times a Day., Disp: 1 Bottle, Rfl: 0    amlodipine (NORVASC) 5 MG TABS, Take 5 mg by mouth every day., Disp: , Rfl:     ALPRAZolam (XANAX) 0.5 MG Tab, Take 0.5 mg by mouth at bedtime as needed for Sleep., Disp: , Rfl:     atorvastatin (LIPITOR) 20 MG Tab, Take 20 mg by mouth every evening., Disp: , Rfl:     folic acid (FOLVITE) 1 MG TABS, Take 1 mg by mouth every day., Disp: , Rfl:     hydrocodone-acetaminophen (MAXIDONE)  MG per tablet, Take 1-2 Tabs by mouth every 6 hours as needed for Mild Pain., Disp: , Rfl:   ALLERGIES: No Known Allergies  SURGHX:   Past Surgical History:   Procedure Laterality Date    KNEE ARTHROPLASTY TOTAL Right 8/27/2018    Procedure: KNEE ARTHROPLASTY TOTAL;  Surgeon: Fabian Kennedy M.D.;  Location: [facility];  Service: Orthopedics    TIBIAL OSTEOTOMY Right 8/27/2018    Procedure: TIBIAL OSTEOTOMY - POSS TUBERCLE;  Surgeon: Fabian Kennedy M.D.;  Location: [facility];  Service: Orthopedics    CATARACT PHACO WITH IOL  7/1/2013    Performed by Tyron Quiles M.D. at [facility]    CATARACT PHACO WITH IOL   "6/17/2013    Performed by Tyron Quiles M.D. at [facility]    CARPAL TUNNEL ENDOSCOPIC  2/5/2010    Performed by EREN KOVACS at [facility]    TUMOR EXCISION WITH BIOPSY Left 2010    left wrist for skin cancer, done in the office    THORACOSCOPY  6/8/2009    Performed by GANSER, JOHN H at SUR[facility]ERY [facility]     SOCHX:  reports that she has been smoking cigarettes. She has a 84.00 pack-year smoking history. She has never used smokeless tobacco. She reports that she does not drink alcohol and does not use drugs.  FH: Reviewed with patient, not pertinent to this visit.       Review of Systems   Constitutional:  Positive for fever and malaise/fatigue. Negative for chills.   HENT:  Positive for congestion, ear pain (ear congestion) and sore throat.         Rhinorrhea   Respiratory:  Positive for cough, sputum production and shortness of breath. Negative for wheezing.    Cardiovascular:  Negative for chest pain.   Gastrointestinal:  Negative for diarrhea, nausea and vomiting.   Musculoskeletal:  Positive for myalgias.            Objective     /68 (BP Location: Left arm, Patient Position: Sitting, BP Cuff Size: Adult)   Pulse 91   Temp 36.8 °C (98.2 °F) (Temporal)   Resp (!) 24   Ht 1.626 m (5' 4\")   Wt 83.9 kg (185 lb)   SpO2 91%   BMI 31.76 kg/m²      Physical Exam  Constitutional:       Appearance: Normal appearance.   HENT:      Head: Normocephalic.      Nose: Congestion present.      Mouth/Throat:      Pharynx: Posterior oropharyngeal erythema present.   Eyes:      Extraocular Movements: Extraocular movements intact.   Cardiovascular:      Rate and Rhythm: Normal rate and regular rhythm.      Pulses: Normal pulses.      Heart sounds: Normal heart sounds.   Pulmonary:      Effort: Pulmonary effort is normal. No respiratory distress.      Breath sounds: Decreased air movement present. Examination of the right-middle field reveals wheezing. Examination of the " left-middle field reveals wheezing. Wheezing present. No rhonchi or rales.   Musculoskeletal:         General: Normal range of motion.      Cervical back: Normal range of motion.   Skin:     General: Skin is warm and dry.   Neurological:      General: No focal deficit present.      Mental Status: She is alert.   Psychiatric:         Mood and Affect: Mood normal.         Behavior: Behavior normal.         Judgment: Judgment normal.          Results:    Rapid strep a-negative          Assessment & Plan       1. COPD exacerbation (HCC)    - predniSONE (DELTASONE) 10 MG Tab; Take orally as follows: 30mg X 3 Days, 20mg X 3 Days, 10mg X 3 Days, then discontinue  Dispense: 18 Tablet; Refill: 0  - benzonatate (TESSALON) 100 MG Cap; Take 1 Capsule by mouth 3 times a day as needed for Cough.  Dispense: 60 Capsule; Refill: 0    2. Sore throat    - POCT Rapid Strep A       Patient's strep is negative.  Her presentation is consistent with COPD exacerbation.  She is prescribed prednisone taper.  Patient is educated on side effects, recommended taking this with food.  May take Tessalon Perles 3 times daily as needed for cough.  Patient was advised to continue current maintenance inhaler for COPD, and may use her albuterol inhaler as needed.  Advised to use oxygen as needed.  Discussed treatment plan with patient, she is agreeable and verbalized understanding.  Educated patient on signs and symptoms watch out for, when to return to the clinic or go to the ER.      Recommended supportive treatment at home:  OTC Tylenol or Motrin for fever/discomfort.  OTC supportive care for nasal congestion - saline nasal spray/Flonase nasal spray and/or netipot  Humidifier and steam inhalation/warm showers.  Increase oral fluid intake.  Warm saline gargles for sore throat.  Follow-up with PCP       Electronically Signed by ANSLEY James

## 2022-09-12 ENCOUNTER — HOSPITAL ENCOUNTER (OUTPATIENT)
Dept: LAB | Facility: MEDICAL CENTER | Age: 77
End: 2022-09-12
Attending: STUDENT IN AN ORGANIZED HEALTH CARE EDUCATION/TRAINING PROGRAM
Payer: MEDICARE

## 2022-09-12 DIAGNOSIS — Z79.899 HIGH RISK MEDICATION USE: ICD-10-CM

## 2022-09-12 DIAGNOSIS — M05.9 SEROPOSITIVE RHEUMATOID ARTHRITIS (HCC): ICD-10-CM

## 2022-09-12 LAB
BASOPHILS # BLD AUTO: 1.3 % (ref 0–1.8)
BASOPHILS # BLD: 0.12 K/UL (ref 0–0.12)
EOSINOPHIL # BLD AUTO: 0.26 K/UL (ref 0–0.51)
EOSINOPHIL NFR BLD: 2.8 % (ref 0–6.9)
ERYTHROCYTE [DISTWIDTH] IN BLOOD BY AUTOMATED COUNT: 63 FL (ref 35.9–50)
HCT VFR BLD AUTO: 40 % (ref 37–47)
HGB BLD-MCNC: 13 G/DL (ref 12–16)
IMM GRANULOCYTES # BLD AUTO: 0.19 K/UL (ref 0–0.11)
IMM GRANULOCYTES NFR BLD AUTO: 2.1 % (ref 0–0.9)
LYMPHOCYTES # BLD AUTO: 1.39 K/UL (ref 1–4.8)
LYMPHOCYTES NFR BLD: 15.2 % (ref 22–41)
MCH RBC QN AUTO: 33.1 PG (ref 27–33)
MCHC RBC AUTO-ENTMCNC: 32.5 G/DL (ref 33.6–35)
MCV RBC AUTO: 101.8 FL (ref 81.4–97.8)
MONOCYTES # BLD AUTO: 0.96 K/UL (ref 0–0.85)
MONOCYTES NFR BLD AUTO: 10.5 % (ref 0–13.4)
NEUTROPHILS # BLD AUTO: 6.25 K/UL (ref 2–7.15)
NEUTROPHILS NFR BLD: 68.1 % (ref 44–72)
NRBC # BLD AUTO: 0 K/UL
NRBC BLD-RTO: 0 /100 WBC
PLATELET # BLD AUTO: 386 K/UL (ref 164–446)
PMV BLD AUTO: 9.5 FL (ref 9–12.9)
RBC # BLD AUTO: 3.93 M/UL (ref 4.2–5.4)
WBC # BLD AUTO: 9.2 K/UL (ref 4.8–10.8)

## 2022-09-12 PROCEDURE — 85025 COMPLETE CBC W/AUTO DIFF WBC: CPT

## 2022-09-12 PROCEDURE — 86140 C-REACTIVE PROTEIN: CPT

## 2022-09-12 PROCEDURE — 80053 COMPREHEN METABOLIC PANEL: CPT

## 2022-09-12 PROCEDURE — 36415 COLL VENOUS BLD VENIPUNCTURE: CPT

## 2022-09-12 PROCEDURE — 85652 RBC SED RATE AUTOMATED: CPT

## 2022-09-13 LAB
ALBUMIN SERPL BCP-MCNC: 4.1 G/DL (ref 3.2–4.9)
ALBUMIN/GLOB SERPL: 1.4 G/DL
ALP SERPL-CCNC: 63 U/L (ref 30–99)
ALT SERPL-CCNC: 40 U/L (ref 2–50)
ANION GAP SERPL CALC-SCNC: 14 MMOL/L (ref 7–16)
AST SERPL-CCNC: 26 U/L (ref 12–45)
BILIRUB SERPL-MCNC: 0.5 MG/DL (ref 0.1–1.5)
BUN SERPL-MCNC: 12 MG/DL (ref 8–22)
CALCIUM SERPL-MCNC: 9.4 MG/DL (ref 8.5–10.5)
CHLORIDE SERPL-SCNC: 100 MMOL/L (ref 96–112)
CO2 SERPL-SCNC: 24 MMOL/L (ref 20–33)
CREAT SERPL-MCNC: 1.08 MG/DL (ref 0.5–1.4)
CRP SERPL HS-MCNC: 2.04 MG/DL (ref 0–0.75)
ERYTHROCYTE [SEDIMENTATION RATE] IN BLOOD BY WESTERGREN METHOD: 37 MM/HOUR (ref 0–25)
GFR SERPLBLD CREATININE-BSD FMLA CKD-EPI: 54 ML/MIN/1.73 M 2
GLOBULIN SER CALC-MCNC: 3 G/DL (ref 1.9–3.5)
GLUCOSE SERPL-MCNC: 117 MG/DL (ref 65–99)
POTASSIUM SERPL-SCNC: 4.8 MMOL/L (ref 3.6–5.5)
PROT SERPL-MCNC: 7.1 G/DL (ref 6–8.2)
SODIUM SERPL-SCNC: 138 MMOL/L (ref 135–145)

## 2022-09-15 ENCOUNTER — OFFICE VISIT (OUTPATIENT)
Dept: RHEUMATOLOGY | Facility: MEDICAL CENTER | Age: 77
End: 2022-09-15
Attending: STUDENT IN AN ORGANIZED HEALTH CARE EDUCATION/TRAINING PROGRAM
Payer: MEDICARE

## 2022-09-15 VITALS
SYSTOLIC BLOOD PRESSURE: 132 MMHG | OXYGEN SATURATION: 95 % | WEIGHT: 185 LBS | TEMPERATURE: 98.1 F | RESPIRATION RATE: 16 BRPM | HEIGHT: 64 IN | HEART RATE: 85 BPM | BODY MASS INDEX: 31.58 KG/M2 | DIASTOLIC BLOOD PRESSURE: 62 MMHG

## 2022-09-15 DIAGNOSIS — M15.9 PRIMARY OSTEOARTHRITIS INVOLVING MULTIPLE JOINTS: ICD-10-CM

## 2022-09-15 DIAGNOSIS — Z79.60 LONG-TERM USE OF IMMUNOSUPPRESSANT MEDICATION: ICD-10-CM

## 2022-09-15 DIAGNOSIS — M05.9 SEROPOSITIVE RHEUMATOID ARTHRITIS (HCC): ICD-10-CM

## 2022-09-15 PROBLEM — M15.0 PRIMARY OSTEOARTHRITIS INVOLVING MULTIPLE JOINTS: Status: ACTIVE | Noted: 2022-09-15

## 2022-09-15 PROCEDURE — 99212 OFFICE O/P EST SF 10 MIN: CPT | Performed by: STUDENT IN AN ORGANIZED HEALTH CARE EDUCATION/TRAINING PROGRAM

## 2022-09-15 PROCEDURE — 99214 OFFICE O/P EST MOD 30 MIN: CPT | Performed by: STUDENT IN AN ORGANIZED HEALTH CARE EDUCATION/TRAINING PROGRAM

## 2022-09-15 ASSESSMENT — FIBROSIS 4 INDEX: FIB4 SCORE: 0.79

## 2022-09-15 NOTE — PATIENT INSTRUCTIONS
AFTER VISIT INSTRUCTIONS    Below are important information to help you navigate your healthcare needs and help us serve you safely and effectively:  If laboratory tests and/or imaging studies were ordered, remember to go get them done.  If new prescriptions or refills were sent to the pharmacy, remember to go pick them up.  Take your medications exactly as prescribed unless instructed otherwise.  If there are significant findings on your lab tests and imaging studies that warrant further action, I will notify you with explanations via [employer] or phone call, otherwise you can view them on [employer] and let me know if you have any questions.  Sign up for [employer] if you have not already done so, in order to have access to the results of your lab tests and imaging studies, and to be able to send and receive messages from me.  Note that [employer] messages are typically read during office hours only and may take 1-7 days before a response depending on the urgency of the situation and how busy my schedule is.  In general, [employer] messaging is for non-urgent matters that do not require immediate attention, so for urgent matters that cannot wait, you are advised to go to an urgent care.

## 2022-09-15 NOTE — PROGRESS NOTES
[facility] RHEUMATOLOGY ? [facility]  [address], [address], [place] [address]  Phone: [phone] ? Fax: [phone]    RHEUMATOLOGY FOLLOW-UP VISIT NOTE      DATE OF SERVICE: 09/15/2022    PRIMARY CARE PROVIDER:  YA Silvestre  [address] [facility] [address] [facility] [place] [address]    PATIENT NAME/ADDRESS:  Angela Winchester  [address]  [place] [address]    YOB: 1945    MRN IDENTIFIER: [id number]     DATE OF LAST VISIT: 8/18/2022      SUBJECTIVE:     CHIEF COMPLAINT:   Chief Complaint   Patient presents with    Follow-Up     Rheumatoid Arthritis        RHEUMATOLOGIC HISTORY:  Angela Winchester is a 76 y.o. female with pertinent history notable for seropositive rheumatoid arthritis diagnosed in 2011 (symptomatic from 2009), osteoarthritis of multiple joints (hands, shoulders, knees) s/p bilateral knee replacements, and DJD of cervical/thoracic spine. Previously under the care of a local rheumatologist who has retired (Dr. Conor Hull), she initially presented on 8/18/2022 to establish care for continued management of her condition. Noted oral surgery in 5/2022 for which her DMARDs were held for almost a month prior, so had a flare for which Dr. Hull prescribed a 10-day course of prednisone 20mg taper which was very helpful. Reported residual but tolerable joint/muscle pain in her hands, wrists, left shoulder, neck, lower back, knees, ankles and feet. These are associated with up to 1 hour of morning stiffness that improves with activity but her joint pain tends to worsen with activity over the course of the day. Noted that she had tried Voltaren gel and had steroid injections to her left shoulder and both knees in the past which were not helpful. Notes that orthopedic surgery recommended left shoulder replacement but she is not inclined to undergoing that.     Pertinent treatment history as of 9/2022: Prednisone tapers during flares, Remicade (discontinued after 3 infusions  due to ineffectiveness), Humira (effective for 8 years, then became ineffective), methotrexate (-8/2022, stopped due to hair loss and microcytic anemia), Xeljanz (-present, helpful), sulfasalazine (8/2022-present).    Pertinent lab results as of 7-9/2022: Strongly positive  and ACPA 174 (in 7/2019), elevated ESR 37 and CRP 2.04, elevated .8 with low RBC 3.93 and normal Hgb; normal CMP, vitamin D, and lipid panel.    INTERVAL HISTORY:  Sore throat and COPD exacerbation requiring urgent care visit on 9/1/22.  Treated with Z-Eliu and a 9-day course of prednisone 30 mg taper.  Held Xeljanz and sulfasalazine for 5 days and resumed subsequently.  Experienced more joint pain in her hands with swelling, left shoulder, knees, ankles and feet.  Presently doing much better with her respiratory and musculoskeletal symptoms.    REVIEW OF SYSTEMS:  Except as noted in the history above, a complete review of systems with emphasis on autoimmune inflammatory conditions was otherwise negative for any significant symptoms.      ACTIVE PROBLEM LIST:  Patient Active Problem List   Diagnosis    Long-term use of immunosuppressant medication    Seropositive rheumatoid arthritis (positive RF and ACCP)    Primary osteoarthritis involving multiple joints (hands, shoulders, knees)   No problem-specific Assessment & Plan notes found for this encounter.      PAST MEDICAL HISTORY:  Past Medical History:   Diagnosis Date    Arthritis     Breath shortness     with exertion    Bronchitis 01/2018    Cancer ([facility]) 2009    LUNG CANCER= 8/13/2018 pt states she had lung resection and it was fungal infection, no cancer; Skin    Cataract     Emphysema of lung ([facility])     High cholesterol     Hypertension     Pain     knee/hand    Pneumonia 02/2018    RA (rheumatoid arthritis) ([facility])     Sleep apnea     CPAP with 2.5 liters oxygen    Snoring        PAST SURGICAL HISTORY:  Past Surgical History:   Procedure Laterality Date    KNEE ARTHROPLASTY TOTAL  Right 8/27/2018    Procedure: KNEE ARTHROPLASTY TOTAL;  Surgeon: Fabian Kennedy M.D.;  Location: SURGERY [facility];  Service: Orthopedics    TIBIAL OSTEOTOMY Right 8/27/2018    Procedure: TIBIAL OSTEOTOMY - POSS TUBERCLE;  Surgeon: Fabian Kennedy M.D.;  Location: SURGERY [facility];  Service: Orthopedics    CATARACT PHACO WITH IOL  7/1/2013    Performed by Tyron Quiles M.D. at SURGERY [facility]    CATARACT PHACO WITH IOL  6/17/2013    Performed by Tyron Quiles M.D. at SURGERY [facility]    CARPAL TUNNEL ENDOSCOPIC  2/5/2010    Performed by EREN KOVACS at SURGERY SAME DAY [facility]    TUMOR EXCISION WITH BIOPSY Left 2010    left wrist for skin cancer, done in the office    THORACOSCOPY  6/8/2009    Performed by GANSER, JOHN H at SURGERY [facility]       MEDICATIONS:  Current Outpatient Medications   Medication Sig Dispense Refill    benzonatate (TESSALON) 100 MG Cap Take 1 Capsule by mouth 3 times a day as needed for Cough. 60 Capsule 0    potassium chloride ER (KLOR-CON) 10 MEQ tablet Take 10 mEq by mouth every day.      furosemide (LASIX) 20 MG Tab TAKE 1/2 TO 1 TABLET BY MOUTH DAILY      metFORMIN (GLUCOPHAGE) 500 MG Tab Take 500 mg by mouth 2 times a day.      TRELEGY ELLIPTA 200-62.5-25 MCG/INH AEROSOL POWDER, BREATH ACTIVATED       Tofacitinib Citrate ER (XELJANZ XR) 11 MG TABLET SR 24 HR Take 1 Tablet by mouth every day. 60 Tablet 5    albuterol 108 (90 Base) MCG/ACT Aero Soln inhalation aerosol Inhale 1-2 Puffs by mouth every four hours as needed for Shortness of Breath. 1 Inhaler 0    Cholecalciferol (VITAMIN D3) 5000 units Cap Take 1 Cap by mouth every day.      losartan (COZAAR) 100 MG Tab Take 100 mg by mouth every day.      metoprolol SR (TOPROL XL) 25 MG TABLET SR 24 HR Take 25 mg by mouth every day.      flunisolide, NASAL, (NASAREL) 29 MCG/ACT (0.025%) SOLN Spray 2 Sprays in nose 2 Times a Day. 1 Bottle 0    amlodipine (NORVASC) 5 MG TABS Take 5  "mg by mouth every day.      ALPRAZolam (XANAX) 0.5 MG Tab Take 0.5 mg by mouth at bedtime as needed for Sleep.      atorvastatin (LIPITOR) 20 MG Tab Take 20 mg by mouth every evening.      folic acid (FOLVITE) 1 MG TABS Take 1 mg by mouth every day.      hydrocodone-acetaminophen (MAXIDONE)  MG per tablet Take 1-2 Tabs by mouth every 6 hours as needed for Mild Pain.      predniSONE (DELTASONE) 10 MG Tab Take orally as follows: 30mg X 3 Days, 20mg X 3 Days, 10mg X 3 Days, then discontinue (Patient not taking: Reported on 9/15/2022) 18 Tablet 0    sulfaSALAzine (AZULFIDINE EN-TAB) 500 MG EC tablet Take 1 Tablet by mouth 2 times a day with meals. (Patient not taking: Reported on 9/15/2022) 180 Tablet 3     No current facility-administered medications for this visit.       ALLERGIES:   No Known Allergies    IMMUNIZATIONS:  Immunization History   Administered Date(s) Administered    Spontacts SARS-CoV-2 Vaccine 03/21/2021, 11/03/2021       SOCIAL HISTORY:   Social History     Tobacco Use    Smoking status: Every Day     Packs/day: 2.00     Years: 42.00     Pack years: 84.00     Types: Cigarettes    Smokeless tobacco: Never    Tobacco comments:     2-3 ppd; 8/13/18 currently 0.5ppd   Vaping Use    Vaping Use: Unknown   Substance Use Topics    Alcohol use: No     Alcohol/week: 0.0 oz    Drug use: No       FAMILY HISTORY:  No family history on file.     OBJECTIVE:     Vital Signs: /62 (BP Location: Right arm, Patient Position: Sitting, BP Cuff Size: Adult)   Pulse 85   Temp 36.7 °C (98.1 °F) (Temporal)   Resp 16   Ht 1.626 m (5' 4\")   Wt 83.9 kg (185 lb)   SpO2 95% Body mass index is 31.76 kg/m².    General: Appears well and comfortable  Eyes: No scleral or conjunctival lesions  ENT: No apparent oral or nasal lesions  Head/Neck: No apparent scalp or neck lesions  Cardiovascular: Regular rate and rhythm  Respiratory: Breathing quiet and unlabored  Gastrointestinal: No organomegaly or abdominal " masses  Integumentary: No significant cutaneous lesions or discolorations  Musculoskeletal: Mild tenderness of hands (on MCP squeeze) with puffiness, wrists (right > left), and left shoulder AC/GH joints with severe restriction in range of motion  Neurologic: No focal sensory or motor deficits  Psychiatric: Mood and affect appropriate      LABORATORY RESULTS REVIEWED AND INTERPRETED BY ME:  Lab Results   Component Value Date/Time    SEDRATEWES 37 (H) 09/12/2022 12:25 PM    CREACTPROT 2.04 (H) 09/12/2022 12:25 PM    URICACID 6.3 03/20/2012 02:49 PM     Lab Results   Component Value Date/Time    RHEUMFACTN 107 (H) 07/02/2019 01:13 PM    CCPANTIBODY 174 (H) 07/02/2019 01:13 PM     Lab Results   Component Value Date/Time    ANTINUCAB None Detected 07/02/2019 01:13 PM     Lab Results   Component Value Date/Time    PROTHROMBTM 10.6 06/04/2009 04:50 PM    INR 0.91 06/04/2009 04:50 PM     Lab Results   Component Value Date/Time    TSHULTRASEN 3.050 01/14/2019 02:03 PM    FREET4 1.24 03/20/2012 02:49 PM     Lab Results   Component Value Date/Time    HEPBSAG Negative 07/02/2019 01:13 PM    HEPBCORIGM Positive (A) 07/02/2019 01:13 PM    HEPBCORTOT Negative 10/01/2019 01:48 PM    HEPCAB Negative 07/02/2019 01:13 PM     Lab Results   Component Value Date/Time    ASTSGOT 26 09/12/2022 12:25 PM    ALTSGPT 40 09/12/2022 12:25 PM    ALKPHOSPHAT 63 09/12/2022 12:25 PM    TBILIRUBIN 0.5 09/12/2022 12:25 PM    TOTPROTEIN 7.1 09/12/2022 12:25 PM    ALBUMIN 4.1 09/12/2022 12:25 PM     Lab Results   Component Value Date/Time    SODIUM 138 09/12/2022 12:25 PM    POTASSIUM 4.8 09/12/2022 12:25 PM    CHLORIDE 100 09/12/2022 12:25 PM    CO2 24 09/12/2022 12:25 PM    GLUCOSE 117 (H) 09/12/2022 12:25 PM    BUN 12 09/12/2022 12:25 PM    CREATININE 1.08 09/12/2022 12:25 PM    CALCIUM 9.4 09/12/2022 12:25 PM    MAGNESIUM 1.9 03/31/2016 01:04 PM     Lab Results   Component Value Date/Time    WBC 9.2 09/12/2022 12:25 PM    RBC 3.93 (L) 09/12/2022  12:25 PM    HEMOGLOBIN 13.0 09/12/2022 12:25 PM    HEMATOCRIT 40.0 09/12/2022 12:25 PM    .8 (H) 09/12/2022 12:25 PM    MCH 33.1 (H) 09/12/2022 12:25 PM    MCHC 32.5 (L) 09/12/2022 12:25 PM    RDW 63.0 (H) 09/12/2022 12:25 PM    PLATELETCT 386 09/12/2022 12:25 PM    MPV 9.5 09/12/2022 12:25 PM    NEUTS 6.25 09/12/2022 12:25 PM    LYMPHOCYTES 15.20 (L) 09/12/2022 12:25 PM    MONOCYTES 10.50 09/12/2022 12:25 PM    EOSINOPHILS 2.80 09/12/2022 12:25 PM    BASOPHILS 1.30 09/12/2022 12:25 PM    ANISOCYTOSIS 1+ 07/02/2019 01:13 PM     Lab Results   Component Value Date/Time    FERRITIN 175.8 03/31/2016 01:04 PM    IRON 64 03/31/2016 01:04 PM    TRANSFERRIN 216 03/31/2016 01:05 PM    FOLATE >20.00 09/08/2011 11:11 AM     Lab Results   Component Value Date/Time    25HYDROXY 98 07/07/2022 02:45 PM     Lab Results   Component Value Date/Time    COLORURINE DK Yellow 08/13/2018 01:40 PM    SPECGRAVITY 1.021 08/13/2018 01:40 PM    PHURINE 6.0 08/13/2018 01:40 PM    GLUCOSEUR Negative 08/13/2018 01:40 PM    KETONES Negative 08/13/2018 01:40 PM    PROTEINURIN 30 (A) 08/13/2018 01:40 PM     Lab Results   Component Value Date/Time    CHOLSTRLTOT 159 07/07/2022 02:45 PM    LDL 89 07/07/2022 02:45 PM    HDL 40 07/07/2022 02:45 PM    TRIGLYCERIDE 149 07/07/2022 02:45 PM    HBA1C 6.5 (H) 11/06/2020 03:19 PM       RADIOLOGY RESULTS REVIEWED AND INTERPRETED BY ME:  Results for orders placed during the hospital encounter of 11/20/06    DX-KNEES-AP BILATERAL STANDING    Impression  IMPRESSION:    MILD SYMMETRIC MEDIAL COMPARTMENT FEMOROTIBIAL OSTEOARTHROSIS.  NO  EVIDENCE OF ANY INFLAMMATORY  PROCESS.    Results for orders placed during the hospital encounter of 11/22/11    DX-FOOT-COMPLETE 3+    Impression  1. No no fracture or malalignment.    2. Chronic changes as described in the findings section.    Results for orders placed during the hospital encounter of 11/22/11    DX-ANKLE 3+ VIEWS    Impression  Negative right ankle series.   Diffuse soft tissue swelling.    Results for orders placed during the hospital encounter of 02/14/08    DX-KNEE COMPLETE 4+    Impression  IMPRESSION:    1. NO ACUTE FRACTURE IDENTIFIED.    2. SMALL KNEE JOINT EFFUSION.    3. PERIARTICULAR DEOSSIFICATION AND MILD OSTEOARTHROSIS.    Results for orders placed during the hospital encounter of 08/27/18    DX-KNEE 2- RIGHT    Impression  1. Status post right total knee replacement without evidence of hardware complication.    Results for orders placed during the hospital encounter of 11/20/06    DX-JOINT SURVEY-FEET SINGLE VIEW    Impression  IMPRESSION:    1. NO EVIDENCE OF INFLAMMATORY ARTHROPATHY.    2. BLAND DEGENERATIVE ARTHROSIS POLYARTICULAR.    Results for orders placed in visit on 08/01/17    DX-HAND 3+ RIGHT    Impression  Osteoarthritis affecting the interphalangeal joints with joint space narrowing, subchondral sclerosis and osteophyte formation.    No fracture or acute bony abnormality.    Results for orders placed during the hospital encounter of 06/03/19    DX-HAND 2- LEFT    Impression  1.  Moderate osteoarthritis.  2.  No evidence for inflammatory arthropathy.  3.  Evidence of old trauma to the distal radial metaphysis.    Results for orders placed during the hospital encounter of 11/20/06    DX-JOINT SURVEY-HANDS SINGLE VIEW    Impression  IMPRESSION:    BLAND DEGENERATIVE OSTEOARTHROSIS OF THE INTERPHALANGEAL JOINTS AND THUMB  BASE.  NO EVIDENCE OF INFLAMMATORY ARTHRITIS.    Results for orders placed during the hospital encounter of 06/03/19    DX-SHOULDER 2+ LEFT    Impression  1.  Severe degenerative change of LEFT shoulder with probable associated joint body.  2.  No fracture or dislocation.    Results for orders placed during the hospital encounter of 04/26/10    DS-BONE DENSITY STUDY (DEXA)    Impression  IMPRESSION:    ACCORDING TO THE WORLD HEALTH ORGANIZATION CLASSIFICATION, BONE MINERAL  DENSITY OF THIS PATIENT IS OSTEOPENIC.    Results for  orders placed during the hospital encounter of 04/22/09    CT-CHEST (THORAX) W/O    Impression  IMPRESSION:    1. 11 MM NONCALCIFIED SPICULATED SOFT TISSUE MASS ANTERIORLY IN THE RIGHT  LUNG APEX (IMAGE 30 ).  THIS IS SUSPICIOUS FOR BRONCHOGENIC  CARCINOMA.  GIVEN ITS SIZE, IT WOULD LIKELY BE SENSITIVE TO FDG IMAGING  IF METABOLICALLY ACTIVE, AS A CANCER WOULD BE EXPECTED TO BE.    2. SMALL NONSPECIFIC MEDIASTINAL ADENOPATHY.    3. EVIDENCE OF PRIOR GRANULOMATOUS EXPOSURE.    Results for orders placed during the hospital encounter of 04/30/09    MR-KNEE-W/O    Impression  IMPRESSION:    1. MACERATED-TYPE TEAR SEEN INVOLVING THE POSTERIOR HORN AND BODY OF THE  LATERAL MENISCUS UNLESS THE PATIENT HAS HAD PRIOR PARTIAL MENISCECTOMY.    2. CHRONIC MILD SPRAIN TO THE ANTERIOR AND POSTERIOR CRUCIATE LIGAMENTS.    3. TRICOMPARTMENT DEGENERATIVE CHANGE WHICH APPEARS TO INVOLVE THE  PATELLOFEMORAL AND THE LATERAL FEMOROTIBIAL ARTICULATIONS TO GREATEST  DEGREE.  SOME DEGENERATIVE CHANGE IS ALSO SEEN INVOLVING THE PROXIMAL  TIBIOFIBULAR ARTICULATION.    4. MODERATE-SIZED JOINT EFFUSION WITH EXTENSIVE SYNOVITIS.    5. LIKELY INTRAARTICULAR OSSIFIC LOOSE BODY SEEN ADJACENT TO THE PROXIMAL  TIBIA POSTERIORLY.    6. MULTIPLE LOW SIGNAL AREAS SEEN WITHIN A MODERATE-SIZED BAKER CYST  WHICH MAY REPRESENT EITHER SYNOVITIS OR EXTRUDED INTRAARTICULAR LOOSE  BODIES.      All relevant laboratory and imaging results reported on this note were reviewed and interpreted by me.      ASSESSMENT AND PLAN:     Angela Winchester is a 76 y.o. female with history as noted above whose presentation merits the following diagnostic and clinical status impressions and recommendations:    1. Seropositive rheumatoid arthritis (positive RF and ACCP)  Clinical picture suggests relatively stable disease activity on the current regimen since last visit, but objective assessment is somewhat confounded by her recent URI with COPD exacerbation for which she  was effectively treated and presently feeling better. No need for modification of treatment at this time, but need to check laboratory markers of disease activity before next visit for complete objective assessment.  - Sed Rate; Future  - CRP QUANTITIVE (NON-CARDIAC); Future  - Continue sulfasalazine 500 mg oral twice daily  - Continue Xeljanz 11 mg SR oral daily but consider switching to 5 mg twice daily dosing if necessary for better absorption    2. Primary osteoarthritis involving multiple joints (hands, shoulders, knees)  Presumably a significant contributor to overall joint pain burden which can be managed with topical NSAIDs and analgesics.  - Okay to use Voltaren 1% gel 3-4 times daily as needed  - Consider intra-articular steroid injection if needed    3. Long-term use of immunosuppressant medication  No current history or physical findings to suggest significant adverse drug effects or opportunistic infections.  - CBC WITH DIFFERENTIAL; Future  - Comp Metabolic Panel; Future  - Need to confirm/address age-appropriate vaccines      FOLLOW-UP: Return in about 3 months (around 12/15/2022) for Short.           Thank you for giving me the opportunity to participate in the care of Angela Winchester.    Basil Ren MD, MS  Rheumatologist, [facility] Rheumatology ? [facility]  , Department of Internal Medicine  Formerly [facility] ? [facility]

## 2022-11-07 ENCOUNTER — PATIENT MESSAGE (OUTPATIENT)
Dept: HEALTH INFORMATION MANAGEMENT | Facility: OTHER | Age: 77
End: 2022-11-07

## 2022-12-13 ENCOUNTER — HOSPITAL ENCOUNTER (OUTPATIENT)
Dept: LAB | Facility: MEDICAL CENTER | Age: 77
End: 2022-12-13
Attending: STUDENT IN AN ORGANIZED HEALTH CARE EDUCATION/TRAINING PROGRAM
Payer: MEDICARE

## 2022-12-13 ENCOUNTER — HOSPITAL ENCOUNTER (OUTPATIENT)
Dept: LAB | Facility: MEDICAL CENTER | Age: 77
End: 2022-12-13
Attending: NURSE PRACTITIONER
Payer: MEDICARE

## 2022-12-13 DIAGNOSIS — Z79.60 LONG-TERM USE OF IMMUNOSUPPRESSANT MEDICATION: ICD-10-CM

## 2022-12-13 DIAGNOSIS — M05.9 SEROPOSITIVE RHEUMATOID ARTHRITIS (HCC): ICD-10-CM

## 2022-12-13 LAB
25(OH)D3 SERPL-MCNC: 91 NG/ML (ref 30–100)
ALBUMIN SERPL BCP-MCNC: 4.1 G/DL (ref 3.2–4.9)
ALBUMIN SERPL BCP-MCNC: 4.2 G/DL (ref 3.2–4.9)
ALBUMIN/GLOB SERPL: 1.4 G/DL
ALBUMIN/GLOB SERPL: 1.5 G/DL
ALP SERPL-CCNC: 61 U/L (ref 30–99)
ALP SERPL-CCNC: 62 U/L (ref 30–99)
ALT SERPL-CCNC: 30 U/L (ref 2–50)
ALT SERPL-CCNC: 31 U/L (ref 2–50)
ANION GAP SERPL CALC-SCNC: 11 MMOL/L (ref 7–16)
ANION GAP SERPL CALC-SCNC: 12 MMOL/L (ref 7–16)
AST SERPL-CCNC: 27 U/L (ref 12–45)
AST SERPL-CCNC: 27 U/L (ref 12–45)
BASOPHILS # BLD AUTO: 1 % (ref 0–1.8)
BASOPHILS # BLD: 0.09 K/UL (ref 0–0.12)
BILIRUB SERPL-MCNC: 0.3 MG/DL (ref 0.1–1.5)
BILIRUB SERPL-MCNC: 0.3 MG/DL (ref 0.1–1.5)
BUN SERPL-MCNC: 11 MG/DL (ref 8–22)
BUN SERPL-MCNC: 11 MG/DL (ref 8–22)
CALCIUM ALBUM COR SERPL-MCNC: 9.5 MG/DL (ref 8.5–10.5)
CALCIUM ALBUM COR SERPL-MCNC: 9.8 MG/DL (ref 8.5–10.5)
CALCIUM SERPL-MCNC: 9.7 MG/DL (ref 8.5–10.5)
CALCIUM SERPL-MCNC: 9.9 MG/DL (ref 8.5–10.5)
CHLORIDE SERPL-SCNC: 102 MMOL/L (ref 96–112)
CHLORIDE SERPL-SCNC: 103 MMOL/L (ref 96–112)
CHOLEST SERPL-MCNC: 169 MG/DL (ref 100–199)
CO2 SERPL-SCNC: 23 MMOL/L (ref 20–33)
CO2 SERPL-SCNC: 24 MMOL/L (ref 20–33)
CREAT SERPL-MCNC: 0.86 MG/DL (ref 0.5–1.4)
CREAT SERPL-MCNC: 0.89 MG/DL (ref 0.5–1.4)
CREAT UR-MCNC: 7.93 MG/DL
CRP SERPL HS-MCNC: <0.3 MG/DL (ref 0–0.75)
EOSINOPHIL # BLD AUTO: 0.14 K/UL (ref 0–0.51)
EOSINOPHIL NFR BLD: 1.6 % (ref 0–6.9)
ERYTHROCYTE [DISTWIDTH] IN BLOOD BY AUTOMATED COUNT: 54.2 FL (ref 35.9–50)
ERYTHROCYTE [SEDIMENTATION RATE] IN BLOOD BY WESTERGREN METHOD: 14 MM/HOUR (ref 0–25)
GFR SERPLBLD CREATININE-BSD FMLA CKD-EPI: 67 ML/MIN/1.73 M 2
GFR SERPLBLD CREATININE-BSD FMLA CKD-EPI: 70 ML/MIN/1.73 M 2
GLOBULIN SER CALC-MCNC: 2.8 G/DL (ref 1.9–3.5)
GLOBULIN SER CALC-MCNC: 2.9 G/DL (ref 1.9–3.5)
GLUCOSE SERPL-MCNC: 108 MG/DL (ref 65–99)
GLUCOSE SERPL-MCNC: 109 MG/DL (ref 65–99)
HCT VFR BLD AUTO: 44.1 % (ref 37–47)
HDLC SERPL-MCNC: 40 MG/DL
HGB BLD-MCNC: 14.8 G/DL (ref 12–16)
IMM GRANULOCYTES # BLD AUTO: 0.05 K/UL (ref 0–0.11)
IMM GRANULOCYTES NFR BLD AUTO: 0.6 % (ref 0–0.9)
LDLC SERPL CALC-MCNC: 103 MG/DL
LYMPHOCYTES # BLD AUTO: 1.51 K/UL (ref 1–4.8)
LYMPHOCYTES NFR BLD: 17.5 % (ref 22–41)
MCH RBC QN AUTO: 33 PG (ref 27–33)
MCHC RBC AUTO-ENTMCNC: 33.6 G/DL (ref 33.6–35)
MCV RBC AUTO: 98.2 FL (ref 81.4–97.8)
MICROALBUMIN UR-MCNC: 1.6 MG/DL
MICROALBUMIN/CREAT UR: 202 MG/G (ref 0–30)
MONOCYTES # BLD AUTO: 1.01 K/UL (ref 0–0.85)
MONOCYTES NFR BLD AUTO: 11.7 % (ref 0–13.4)
NEUTROPHILS # BLD AUTO: 5.83 K/UL (ref 2–7.15)
NEUTROPHILS NFR BLD: 67.6 % (ref 44–72)
NRBC # BLD AUTO: 0 K/UL
NRBC BLD-RTO: 0 /100 WBC
PLATELET # BLD AUTO: 279 K/UL (ref 164–446)
PMV BLD AUTO: 10.2 FL (ref 9–12.9)
POTASSIUM SERPL-SCNC: 4.4 MMOL/L (ref 3.6–5.5)
POTASSIUM SERPL-SCNC: 4.5 MMOL/L (ref 3.6–5.5)
PROT SERPL-MCNC: 6.9 G/DL (ref 6–8.2)
PROT SERPL-MCNC: 7.1 G/DL (ref 6–8.2)
RBC # BLD AUTO: 4.49 M/UL (ref 4.2–5.4)
SODIUM SERPL-SCNC: 137 MMOL/L (ref 135–145)
SODIUM SERPL-SCNC: 138 MMOL/L (ref 135–145)
TRIGL SERPL-MCNC: 128 MG/DL (ref 0–149)
WBC # BLD AUTO: 8.6 K/UL (ref 4.8–10.8)

## 2022-12-13 PROCEDURE — 85652 RBC SED RATE AUTOMATED: CPT

## 2022-12-13 PROCEDURE — 80061 LIPID PANEL: CPT

## 2022-12-13 PROCEDURE — 86140 C-REACTIVE PROTEIN: CPT

## 2022-12-13 PROCEDURE — 82570 ASSAY OF URINE CREATININE: CPT

## 2022-12-13 PROCEDURE — 82043 UR ALBUMIN QUANTITATIVE: CPT

## 2022-12-13 PROCEDURE — 80053 COMPREHEN METABOLIC PANEL: CPT | Mod: 91

## 2022-12-13 PROCEDURE — 36415 COLL VENOUS BLD VENIPUNCTURE: CPT

## 2022-12-13 PROCEDURE — 85025 COMPLETE CBC W/AUTO DIFF WBC: CPT

## 2022-12-13 PROCEDURE — 82306 VITAMIN D 25 HYDROXY: CPT

## 2022-12-13 PROCEDURE — 80053 COMPREHEN METABOLIC PANEL: CPT

## 2022-12-22 ENCOUNTER — OFFICE VISIT (OUTPATIENT)
Dept: RHEUMATOLOGY | Facility: MEDICAL CENTER | Age: 77
End: 2022-12-22
Attending: STUDENT IN AN ORGANIZED HEALTH CARE EDUCATION/TRAINING PROGRAM
Payer: MEDICARE

## 2022-12-22 VITALS
RESPIRATION RATE: 16 BRPM | HEIGHT: 64 IN | DIASTOLIC BLOOD PRESSURE: 80 MMHG | BODY MASS INDEX: 29.53 KG/M2 | WEIGHT: 173 LBS | OXYGEN SATURATION: 97 % | SYSTOLIC BLOOD PRESSURE: 124 MMHG | HEART RATE: 87 BPM | TEMPERATURE: 98.2 F

## 2022-12-22 DIAGNOSIS — M15.9 PRIMARY OSTEOARTHRITIS INVOLVING MULTIPLE JOINTS: ICD-10-CM

## 2022-12-22 DIAGNOSIS — Z79.60 LONG-TERM USE OF IMMUNOSUPPRESSANT MEDICATION: ICD-10-CM

## 2022-12-22 DIAGNOSIS — M05.9 SEROPOSITIVE RHEUMATOID ARTHRITIS (HCC): ICD-10-CM

## 2022-12-22 PROCEDURE — 99212 OFFICE O/P EST SF 10 MIN: CPT | Performed by: STUDENT IN AN ORGANIZED HEALTH CARE EDUCATION/TRAINING PROGRAM

## 2022-12-22 PROCEDURE — 99214 OFFICE O/P EST MOD 30 MIN: CPT | Performed by: STUDENT IN AN ORGANIZED HEALTH CARE EDUCATION/TRAINING PROGRAM

## 2022-12-22 ASSESSMENT — FIBROSIS 4 INDEX: FIB4 SCORE: 1.33

## 2022-12-22 NOTE — PROGRESS NOTES
[facility] RHEUMATOLOGY  [address], [address], [place] [address]  Phone: [phone] ? Fax: [phone]    RHEUMATOLOGY FOLLOW-UP VISIT NOTE      DATE OF SERVICE: 12/22/2022         Subjective     PRIMARY CARE PRACTITIONER:  YA Silvestre  [address] [place] [address]    PATIENT IDENTIFICATION:  Angela Winchester  [address]  [place] [address]    YOB: 1945    MEDICAL RECORD NUMBER: [id number]          CHIEF COMPLAINT:   Chief Complaint   Patient presents with    Follow-Up     Rheumatoid arthritis        RHEUMATOLOGIC HISTORY:  Angela Winchester is a 77 y.o. female with pertinent history notable for seropositive rheumatoid arthritis diagnosed in 2011 (symptomatic from 2009), osteoarthritis of multiple joints (hands, shoulders, knees, feet) s/p bilateral knee replacements, and DJD of cervical/thoracic spine. Previously under the care of a local rheumatologist who has retired (Dr. Conor Hull), she initially presented on 8/18/2022 to establish care for continued management of her condition. Noted oral surgery in 5/2022 for which her DMARDs were held for almost a month prior, so had a flare for which Dr. Hull prescribed a 10-day course of prednisone 20mg taper which was very helpful. Reported residual but tolerable joint/muscle pain in her hands, wrists, left shoulder, neck, lower back, knees, ankles and feet. These were associated with up to 1 hour of morning stiffness that improved with activity but her joint pain tended to worsen with much activity over the course of the day. Noted that she had tried Voltaren gel and had steroid injections to her left shoulder and both knees in the past which were not very helpful. Noted that orthopedic surgery recommended left shoulder replacement but she was not inclined to undergoing surgery.     Pertinent treatment history as of 12/2022: Prednisone tapers during flares, Remicade (discontinued after 3 infusions due to  ineffectiveness), Humira (effective for 8 years, then became ineffective), methotrexate (stopped in 8/2022 due to hair loss and macrocytic anemia), Xeljanz 11 mg SR daily (2020-present, effective), sulfasalazine 500 mg twice daily (8/2022-present, effective).     Pertinent lab results as of 12/2022: Strongly positive  and anti- (in 7/2019); normal ESR, CRP, vitamin D, unremarkable CBC and CMP.    INTERVAL HISTORY:  Joint pain and hands, shoulders, neck/cervical region, knees, and feet.  Roughly 30-60 minutes of morning stiffness that improves with activity.  Worsening of her joint pain on physical activity.  Hair loss improved after she stopped methotrexate.  Feels that current regimen of Xeljanz and sulfasalazine have been helpful.    REVIEW OF SYSTEMS:  Except as noted in the history above, relevant review of systems with emphasis on autoimmune inflammatory processes was otherwise negative.      ACTIVE PROBLEM LIST:  Patient Active Problem List    Diagnosis Date Noted    Long-term use of immunosuppressant medication 09/15/2022    Seropositive rheumatoid arthritis (HCC) 09/15/2022    Primary osteoarthritis involving multiple joints 09/15/2022       PAST MEDICAL HISTORY:  Past Medical History:   Diagnosis Date    Arthritis     Breath shortness     with exertion    Bronchitis 01/2018    Cancer (HCC) 2009    LUNG CANCER= 8/13/2018 pt states she had lung resection and it was fungal infection, no cancer; Skin    Cataract     Emphysema of lung (HCC)     High cholesterol     Hypertension     Pain     knee/hand    Pneumonia 02/2018    RA (rheumatoid arthritis) ([facility])     Sleep apnea     CPAP with 2.5 liters oxygen    Snoring        PAST SURGICAL HISTORY:  Past Surgical History:   Procedure Laterality Date    KNEE ARTHROPLASTY TOTAL Right 8/27/2018    Procedure: KNEE ARTHROPLASTY TOTAL;  Surgeon: Fabian Kennedy M.D.;  Location: SURGERY [facility];  Service: Orthopedics    TIBIAL OSTEOTOMY Right 8/27/2018     Procedure: TIBIAL OSTEOTOMY - POSS TUBERCLE;  Surgeon: Fabian Kennedy M.D.;  Location: SURGERY [facility];  Service: Orthopedics    CATARACT PHACO WITH IOL  7/1/2013    Performed by Tyron Quiles M.D. at SURGERY [facility]    CATARACT PHACO WITH IOL  6/17/2013    Performed by Tyron Quiles M.D. at SURGERY [facility]    CARPAL TUNNEL ENDOSCOPIC  2/5/2010    Performed by EREN KOVACS at SURGERY [facility]    TUMOR EXCISION WITH BIOPSY Left 2010    left wrist for skin cancer, done in the office    THORACOSCOPY  6/8/2009    Performed by GANSER, JOHN H at SURGERY [facility]       MEDICATIONS:  Current Outpatient Medications   Medication Sig    benzonatate (TESSALON) 100 MG Cap Take 1 Capsule by mouth 3 times a day as needed for Cough.    potassium chloride ER (KLOR-CON) 10 MEQ tablet Take 10 mEq by mouth every day.    furosemide (LASIX) 20 MG Tab TAKE 1/2 TO 1 TABLET BY MOUTH DAILY    metFORMIN (GLUCOPHAGE) 500 MG Tab Take 500 mg by mouth 2 times a day.    TRELEGY ELLIPTA 200-62.5-25 MCG/INH AEROSOL POWDER, BREATH ACTIVATED     Tofacitinib Citrate ER (XELJANZ XR) 11 MG TABLET SR 24 HR Take 1 Tablet by mouth every day.    sulfaSALAzine (AZULFIDINE EN-TAB) 500 MG EC tablet Take 1 Tablet by mouth 2 times a day with meals.    albuterol 108 (90 Base) MCG/ACT Aero Soln inhalation aerosol Inhale 1-2 Puffs by mouth every four hours as needed for Shortness of Breath.    Cholecalciferol (VITAMIN D3) 5000 units Cap Take 1 Cap by mouth every day.    losartan (COZAAR) 100 MG Tab Take 100 mg by mouth every day.    metoprolol SR (TOPROL XL) 25 MG TABLET SR 24 HR Take 25 mg by mouth every day.    flunisolide, NASAL, (NASAREL) 29 MCG/ACT (0.025%) SOLN Spray 2 Sprays in nose 2 Times a Day.    amlodipine (NORVASC) 5 MG TABS Take 5 mg by mouth every day.    ALPRAZolam (XANAX) 0.5 MG Tab Take 0.5 mg by mouth at bedtime as needed for Sleep.    atorvastatin (LIPITOR) 20 MG Tab Take 20 mg by  "mouth every evening.    hydrocodone-acetaminophen (MAXIDONE)  MG per tablet Take 1-2 Tabs by mouth every 6 hours as needed for Mild Pain.       ALLERGIES:   No Known Allergies    IMMUNIZATIONS:  Immunization History   Administered Date(s) Administered    Deepak SARS-CoV-2 Vaccine 03/21/2021, 11/03/2021       SOCIAL HISTORY:   Social History     Socioeconomic History    Marital status:    Tobacco Use    Smoking status: Every Day     Packs/day: 2.00     Years: 42.00     Pack years: 84.00     Types: Cigarettes    Smokeless tobacco: Never    Tobacco comments:     2-3 ppd; 8/13/18 currently 0.5ppd   Vaping Use    Vaping Use: Unknown   Substance and Sexual Activity    Alcohol use: No     Alcohol/week: 0.0 oz    Drug use: No       FAMILY HISTORY:  History reviewed. No pertinent family history.         Objective     Vital Signs: /80 (BP Location: Right arm, Patient Position: Sitting, BP Cuff Size: Adult)   Pulse 87   Temp 36.8 °C (98.2 °F) (Temporal)   Resp 16   Ht 1.626 m (5' 4\")   Wt 78.5 kg (173 lb)   SpO2 97% Body mass index is 29.7 kg/m².    General: Appears well and comfortable  Eyes: No scleral or conjunctival lesions  ENT: No apparent oral or nasal lesions  Head/Neck: No apparent scalp or neck lesions  Cardiovascular: Regular rate and rhythm  Respiratory: Breathing quiet and unlabored  Gastrointestinal: No organomegaly or abdominal masses  Integumentary: No significant cutaneous lesions or discolorations  Musculoskeletal: Minimal tenderness of hands (on MCP squeeze), wrists (right > left), and left shoulder AC/GH joints with restricted range of motion; no significant periarticular soft tissue swelling, warmth, erythema, or overt signs of synovitis  Neurologic: No focal sensory or motor deficits  Psychiatric: Mood and affect appropriate      LABORATORY RESULTS REVIEWED AND INTERPRETED BY ME:  Lab Results   Component Value Date/Time    SEDRATEWES 14 12/13/2022 12:32 PM    CREACTPROT <0.30 " 12/13/2022 12:32 PM    URICACID 6.3 03/20/2012 02:49 PM     Lab Results   Component Value Date/Time    RHEUMFACTN 107 (H) 07/02/2019 01:13 PM    CCPANTIBODY 174 (H) 07/02/2019 01:13 PM     Lab Results   Component Value Date/Time    ANTINUCAB None Detected 07/02/2019 01:13 PM     Lab Results   Component Value Date/Time    PROTHROMBTM 10.6 06/04/2009 04:50 PM    INR 0.91 06/04/2009 04:50 PM     Lab Results   Component Value Date/Time    TSHULTRASEN 3.050 01/14/2019 02:03 PM    FREET4 1.24 03/20/2012 02:49 PM     Lab Results   Component Value Date/Time    HEPBSAG Negative 07/02/2019 01:13 PM    HEPBCORIGM Positive (A) 07/02/2019 01:13 PM    HEPBCORTOT Negative 10/01/2019 01:48 PM    HEPCAB Negative 07/02/2019 01:13 PM     Lab Results   Component Value Date/Time    ASTSGOT 27 12/13/2022 12:32 PM    ALTSGPT 30 12/13/2022 12:32 PM    ALKPHOSPHAT 62 12/13/2022 12:32 PM    TBILIRUBIN 0.3 12/13/2022 12:32 PM    TOTPROTEIN 6.9 12/13/2022 12:32 PM    ALBUMIN 4.1 12/13/2022 12:32 PM     Lab Results   Component Value Date/Time    SODIUM 138 12/13/2022 12:32 PM    POTASSIUM 4.5 12/13/2022 12:32 PM    CHLORIDE 103 12/13/2022 12:32 PM    CO2 24 12/13/2022 12:32 PM    GLUCOSE 109 (H) 12/13/2022 12:32 PM    BUN 11 12/13/2022 12:32 PM    CREATININE 0.86 12/13/2022 12:32 PM    CALCIUM 9.9 12/13/2022 12:32 PM    MAGNESIUM 1.9 03/31/2016 01:04 PM     Lab Results   Component Value Date/Time    WBC 8.6 12/13/2022 12:32 PM    RBC 4.49 12/13/2022 12:32 PM    HEMOGLOBIN 14.8 12/13/2022 12:32 PM    HEMATOCRIT 44.1 12/13/2022 12:32 PM    MCV 98.2 (H) 12/13/2022 12:32 PM    MCH 33.0 12/13/2022 12:32 PM    MCHC 33.6 12/13/2022 12:32 PM    RDW 54.2 (H) 12/13/2022 12:32 PM    PLATELETCT 279 12/13/2022 12:32 PM    MPV 10.2 12/13/2022 12:32 PM    NEUTS 5.83 12/13/2022 12:32 PM    LYMPHOCYTES 17.50 (L) 12/13/2022 12:32 PM    MONOCYTES 11.70 12/13/2022 12:32 PM    EOSINOPHILS 1.60 12/13/2022 12:32 PM    BASOPHILS 1.00 12/13/2022 12:32 PM     ANISOCYTOSIS 1+ 07/02/2019 01:13 PM     Lab Results   Component Value Date/Time    FERRITIN 175.8 03/31/2016 01:04 PM    IRON 64 03/31/2016 01:04 PM    TRANSFERRIN 216 03/31/2016 01:05 PM    FOLATE >20.00 09/08/2011 11:11 AM     Lab Results   Component Value Date/Time    25HYDROXY 91 12/13/2022 12:31 PM     Lab Results   Component Value Date/Time    COLORURINE DK Yellow 08/13/2018 01:40 PM    SPECGRAVITY 1.021 08/13/2018 01:40 PM    PHURINE 6.0 08/13/2018 01:40 PM    GLUCOSEUR Negative 08/13/2018 01:40 PM    KETONES Negative 08/13/2018 01:40 PM    PROTEINURIN 30 (A) 08/13/2018 01:40 PM     Lab Results   Component Value Date/Time    MICROALBUR 1.6 12/13/2022 12:31 PM    MALBCRT 202 (H) 12/13/2022 12:31 PM     Lab Results   Component Value Date/Time    CHOLSTRLTOT 169 12/13/2022 12:31 PM     (H) 12/13/2022 12:31 PM    HDL 40 12/13/2022 12:31 PM    TRIGLYCERIDE 128 12/13/2022 12:31 PM    HBA1C 6.5 (H) 11/06/2020 03:19 PM       RADIOLOGY RESULTS REVIEWED AND INTERPRETED BY ME:  Results for orders placed during the hospital encounter of 11/20/06    DX-KNEES-AP BILATERAL STANDING    Impression  IMPRESSION:    MILD SYMMETRIC MEDIAL COMPARTMENT FEMOROTIBIAL OSTEOARTHROSIS.  NO  EVIDENCE OF ANY INFLAMMATORY  PROCESS.    Results for orders placed during the hospital encounter of 11/22/11    DX-FOOT-COMPLETE 3+    Impression  1. No no fracture or malalignment.    2. Chronic changes as described in the findings section.    Results for orders placed during the hospital encounter of 11/22/11    DX-ANKLE 3+ VIEWS    Impression  Negative right ankle series.  Diffuse soft tissue swelling.    Results for orders placed during the hospital encounter of 02/14/08    DX-KNEE COMPLETE 4+    Impression  IMPRESSION:    1. NO ACUTE FRACTURE IDENTIFIED.    2. SMALL KNEE JOINT EFFUSION.    3. PERIARTICULAR DEOSSIFICATION AND MILD OSTEOARTHROSIS.    Results for orders placed during the hospital encounter of 08/27/18    DX-KNEE 2-  RIGHT    Impression  1. Status post right total knee replacement without evidence of hardware complication.    Results for orders placed during the hospital encounter of 11/20/06    DX-JOINT SURVEY-FEET SINGLE VIEW    Impression  IMPRESSION:    1. NO EVIDENCE OF INFLAMMATORY ARTHROPATHY.    2. BLAND DEGENERATIVE ARTHROSIS POLYARTICULAR.    Results for orders placed in visit on 08/01/17    DX-HAND 3+ RIGHT    Impression  Osteoarthritis affecting the interphalangeal joints with joint space narrowing, subchondral sclerosis and osteophyte formation.    No fracture or acute bony abnormality.    Results for orders placed during the hospital encounter of 06/03/19    DX-HAND 2- LEFT    Impression  1.  Moderate osteoarthritis.  2.  No evidence for inflammatory arthropathy.  3.  Evidence of old trauma to the distal radial metaphysis.    Results for orders placed during the hospital encounter of 11/20/06    DX-JOINT SURVEY-HANDS SINGLE VIEW    Impression  IMPRESSION:    BLAND DEGENERATIVE OSTEOARTHROSIS OF THE INTERPHALANGEAL JOINTS AND THUMB  BASE.  NO EVIDENCE OF INFLAMMATORY ARTHRITIS.    Results for orders placed during the hospital encounter of 06/03/19    DX-SHOULDER 2+ LEFT    Impression  1.  Severe degenerative change of LEFT shoulder with probable associated joint body.  2.  No fracture or dislocation.    Results for orders placed during the hospital encounter of 04/26/10    DS-BONE DENSITY STUDY (DEXA)    Impression  IMPRESSION:    ACCORDING TO THE WORLD HEALTH ORGANIZATION CLASSIFICATION, BONE MINERAL  DENSITY OF THIS PATIENT IS OSTEOPENIC.    Results for orders placed during the hospital encounter of 04/22/09    CT-CHEST (THORAX) W/O    Impression  IMPRESSION:    1. 11 MM NONCALCIFIED SPICULATED SOFT TISSUE MASS ANTERIORLY IN THE RIGHT  LUNG APEX (IMAGE 30 ).  THIS IS SUSPICIOUS FOR BRONCHOGENIC  CARCINOMA.  GIVEN ITS SIZE, IT WOULD LIKELY BE SENSITIVE TO FDG IMAGING  IF METABOLICALLY ACTIVE, AS A CANCER  WOULD BE EXPECTED TO BE.    2. SMALL NONSPECIFIC MEDIASTINAL ADENOPATHY.    3. EVIDENCE OF PRIOR GRANULOMATOUS EXPOSURE.    Results for orders placed during the hospital encounter of 04/30/09    MR-KNEE-W/O    Impression  IMPRESSION:    1. MACERATED-TYPE TEAR SEEN INVOLVING THE POSTERIOR HORN AND BODY OF THE  LATERAL MENISCUS UNLESS THE PATIENT HAS HAD PRIOR PARTIAL MENISCECTOMY.    2. CHRONIC MILD SPRAIN TO THE ANTERIOR AND POSTERIOR CRUCIATE LIGAMENTS.    3. TRICOMPARTMENT DEGENERATIVE CHANGE WHICH APPEARS TO INVOLVE THE  PATELLOFEMORAL AND THE LATERAL FEMOROTIBIAL ARTICULATIONS TO GREATEST  DEGREE.  SOME DEGENERATIVE CHANGE IS ALSO SEEN INVOLVING THE PROXIMAL  TIBIOFIBULAR ARTICULATION.    4. MODERATE-SIZED JOINT EFFUSION WITH EXTENSIVE SYNOVITIS.    5. LIKELY INTRAARTICULAR OSSIFIC LOOSE BODY SEEN ADJACENT TO THE PROXIMAL  TIBIA POSTERIORLY.    6. MULTIPLE LOW SIGNAL AREAS SEEN WITHIN A MODERATE-SIZED BAKER CYST  WHICH MAY REPRESENT EITHER SYNOVITIS OR EXTRUDED INTRAARTICULAR LOOSE  BODIES.      All relevant laboratory and imaging results reported on this note were reviewed and interpreted by me.         Assessment & Plan     Angela Winchester is a 77 y.o. female with history as noted above whose presentation merits the following diagnostic and clinical status impressions and recommendations:    1. Seropositive rheumatoid arthritis (HCC)  Clinically relatively low disease activity without significant evidence of evolving or impending flare on the current regimen of Xeljanz and sulfasalazine, so no need for escalation of treatment. However, given the potential for discordance between immunologic activity and clinical disease manifestations, reasonable to routinely check serologic markers of disease activity for complete assessment.  - Sed Rate; Future  - CRP QUANTITIVE (NON-CARDIAC); Future  - Continue Xeljanz 11 mg SR daily  - Continue sulfasalazine 500 mg twice daily    2. Primary osteoarthritis involving  multiple joints  Presumably a significant contributor to her overall joint pain burden which can be managed with topical or oral NSAIDs and analgesics.  - Consider intra-articular steroid injection if becomes necessary    3. Long-term use of immunosuppressant medication  No present historical, physical, or laboratory evidence to suggest significant adverse drug effects or opportunistic infections.  - CBC WITH DIFFERENTIAL; Future  - Comp Metabolic Panel; Future  - Need to ascertain age-appropriate vaccines      FOLLOW-UP: Return in about 4 months (around 4/22/2023).         Thank you for the opportunity to participate in the care of Angela Winchester.    aBsil Ren MD, MS  Rheumatologist, [facility] ? [facility]   of Clinical Medicine, Department of Internal Medicine  [facility] ? [facility]

## 2022-12-22 NOTE — PATIENT INSTRUCTIONS
AFTER VISIT INSTRUCTIONS    Below are important information to help you navigate your healthcare needs and help us serve you safely and effectively:  If laboratory tests and/or imaging studies were ordered, remember to go get them done as instructed.  If new prescriptions and/or refills were sent, remember to go pick them up from your local pharmacy, or call the specialty pharmacy to request shipment.  Always take your prescription medications exactly as prescribed unless instructed otherwise.  Note that antirheumatic drugs and steroids are immunosuppressive which means they increase your risk of infections and have multiple potential adverse effects on various organ systems in your body, though most of them are uncommon.  It is important that you are up-to-date on age-appropriate immunizations, particularly shingles and bacterial/viral pneumonia vaccines, which you can request from me or your primary care provider.  Be sure to read the drug package inserts to learn about the potential side effects of your medications before you start taking them.  If you experience any significant drug side effects, stop taking the medication and notify me promptly, and depending on the severity of the side effects, consider going to an urgent care or emergency department for immediate attention.  If there are significant findings on your lab tests and imaging studies that warrant further action, I will notify you with explanations via [employer] or phone call, otherwise you can view them on [employer] and let me know if you have any questions.  Note that [employer] messages are typically read during office hours and may take 1-7 business days before a response depending on the urgency of the situation and how busy my clinic schedule is.  In general, [employer] messaging is for non-urgent matters that do not require immediate attention, so for urgent matters that cannot wait, you are advised to go to an urgent care.  Lastly, you are granted  MyChart access to my documentation of your visit and are encouraged to read my note which details my assessment and plan for your condition.

## 2023-04-14 ENCOUNTER — HOSPITAL ENCOUNTER (OUTPATIENT)
Dept: LAB | Facility: MEDICAL CENTER | Age: 78
End: 2023-04-14
Attending: STUDENT IN AN ORGANIZED HEALTH CARE EDUCATION/TRAINING PROGRAM
Payer: MEDICARE

## 2023-04-14 DIAGNOSIS — Z79.60 LONG-TERM USE OF IMMUNOSUPPRESSANT MEDICATION: ICD-10-CM

## 2023-04-14 DIAGNOSIS — M05.9 SEROPOSITIVE RHEUMATOID ARTHRITIS (HCC): ICD-10-CM

## 2023-04-14 LAB
ALBUMIN SERPL BCP-MCNC: 4.3 G/DL (ref 3.2–4.9)
ALBUMIN/GLOB SERPL: 1.4 G/DL
ALP SERPL-CCNC: 75 U/L (ref 30–99)
ALT SERPL-CCNC: 20 U/L (ref 2–50)
ANION GAP SERPL CALC-SCNC: 13 MMOL/L (ref 7–16)
AST SERPL-CCNC: 17 U/L (ref 12–45)
BASOPHILS # BLD AUTO: 1 % (ref 0–1.8)
BASOPHILS # BLD: 0.09 K/UL (ref 0–0.12)
BILIRUB SERPL-MCNC: 0.3 MG/DL (ref 0.1–1.5)
BUN SERPL-MCNC: 12 MG/DL (ref 8–22)
CALCIUM ALBUM COR SERPL-MCNC: 9.4 MG/DL (ref 8.5–10.5)
CALCIUM SERPL-MCNC: 9.6 MG/DL (ref 8.5–10.5)
CHLORIDE SERPL-SCNC: 100 MMOL/L (ref 96–112)
CO2 SERPL-SCNC: 24 MMOL/L (ref 20–33)
CREAT SERPL-MCNC: 0.92 MG/DL (ref 0.5–1.4)
CRP SERPL HS-MCNC: 0.4 MG/DL (ref 0–0.75)
EOSINOPHIL # BLD AUTO: 0.14 K/UL (ref 0–0.51)
EOSINOPHIL NFR BLD: 1.5 % (ref 0–6.9)
ERYTHROCYTE [DISTWIDTH] IN BLOOD BY AUTOMATED COUNT: 55.2 FL (ref 35.9–50)
ERYTHROCYTE [SEDIMENTATION RATE] IN BLOOD BY WESTERGREN METHOD: 15 MM/HOUR (ref 0–25)
GFR SERPLBLD CREATININE-BSD FMLA CKD-EPI: 65 ML/MIN/1.73 M 2
GLOBULIN SER CALC-MCNC: 3.1 G/DL (ref 1.9–3.5)
GLUCOSE SERPL-MCNC: 87 MG/DL (ref 65–99)
HCT VFR BLD AUTO: 43.7 % (ref 37–47)
HGB BLD-MCNC: 14.5 G/DL (ref 12–16)
IMM GRANULOCYTES # BLD AUTO: 0.1 K/UL (ref 0–0.11)
IMM GRANULOCYTES NFR BLD AUTO: 1.1 % (ref 0–0.9)
LYMPHOCYTES # BLD AUTO: 2.76 K/UL (ref 1–4.8)
LYMPHOCYTES NFR BLD: 29.3 % (ref 22–41)
MCH RBC QN AUTO: 33 PG (ref 27–33)
MCHC RBC AUTO-ENTMCNC: 33.2 G/DL (ref 33.6–35)
MCV RBC AUTO: 99.3 FL (ref 81.4–97.8)
MONOCYTES # BLD AUTO: 0.88 K/UL (ref 0–0.85)
MONOCYTES NFR BLD AUTO: 9.4 % (ref 0–13.4)
NEUTROPHILS # BLD AUTO: 5.44 K/UL (ref 2–7.15)
NEUTROPHILS NFR BLD: 57.7 % (ref 44–72)
NRBC # BLD AUTO: 0 K/UL
NRBC BLD-RTO: 0 /100 WBC
PLATELET # BLD AUTO: 292 K/UL (ref 164–446)
PMV BLD AUTO: 9.8 FL (ref 9–12.9)
POTASSIUM SERPL-SCNC: 4.9 MMOL/L (ref 3.6–5.5)
PROT SERPL-MCNC: 7.4 G/DL (ref 6–8.2)
RBC # BLD AUTO: 4.4 M/UL (ref 4.2–5.4)
SODIUM SERPL-SCNC: 137 MMOL/L (ref 135–145)
WBC # BLD AUTO: 9.4 K/UL (ref 4.8–10.8)

## 2023-04-14 PROCEDURE — 36415 COLL VENOUS BLD VENIPUNCTURE: CPT

## 2023-04-14 PROCEDURE — 80053 COMPREHEN METABOLIC PANEL: CPT

## 2023-04-14 PROCEDURE — 85652 RBC SED RATE AUTOMATED: CPT

## 2023-04-14 PROCEDURE — 85025 COMPLETE CBC W/AUTO DIFF WBC: CPT

## 2023-04-14 PROCEDURE — 86140 C-REACTIVE PROTEIN: CPT

## 2023-04-20 ENCOUNTER — OFFICE VISIT (OUTPATIENT)
Dept: RHEUMATOLOGY | Facility: MEDICAL CENTER | Age: 78
End: 2023-04-20
Attending: STUDENT IN AN ORGANIZED HEALTH CARE EDUCATION/TRAINING PROGRAM
Payer: MEDICARE

## 2023-04-20 VITALS
HEART RATE: 73 BPM | DIASTOLIC BLOOD PRESSURE: 64 MMHG | SYSTOLIC BLOOD PRESSURE: 100 MMHG | HEIGHT: 64 IN | TEMPERATURE: 97.1 F | BODY MASS INDEX: 27.83 KG/M2 | WEIGHT: 163 LBS | OXYGEN SATURATION: 94 %

## 2023-04-20 DIAGNOSIS — M05.9 SEROPOSITIVE RHEUMATOID ARTHRITIS (HCC): ICD-10-CM

## 2023-04-20 DIAGNOSIS — D84.9 IMMUNOSUPPRESSED STATUS (HCC): ICD-10-CM

## 2023-04-20 DIAGNOSIS — D22.9 MULTIPLE ATYPICAL SKIN MOLES: ICD-10-CM

## 2023-04-20 DIAGNOSIS — M15.9 PRIMARY OSTEOARTHRITIS INVOLVING MULTIPLE JOINTS: ICD-10-CM

## 2023-04-20 PROCEDURE — 99212 OFFICE O/P EST SF 10 MIN: CPT | Performed by: STUDENT IN AN ORGANIZED HEALTH CARE EDUCATION/TRAINING PROGRAM

## 2023-04-20 PROCEDURE — 99214 OFFICE O/P EST MOD 30 MIN: CPT | Performed by: STUDENT IN AN ORGANIZED HEALTH CARE EDUCATION/TRAINING PROGRAM

## 2023-04-20 RX ORDER — AMLODIPINE BESYLATE 10 MG/1
TABLET ORAL
COMMUNITY

## 2023-04-20 RX ORDER — ATORVASTATIN CALCIUM 20 MG/1
TABLET, FILM COATED ORAL
COMMUNITY
End: 2023-04-20

## 2023-04-20 RX ORDER — PREDNISONE 20 MG/1
TABLET ORAL
COMMUNITY
End: 2023-04-20

## 2023-04-20 RX ORDER — FLUTICASONE PROPIONATE 50 MCG
SPRAY, SUSPENSION (ML) NASAL
COMMUNITY

## 2023-04-20 RX ORDER — ALPRAZOLAM 0.5 MG/1
TABLET ORAL
COMMUNITY
End: 2023-04-20

## 2023-04-20 RX ORDER — DOXYCYCLINE HYCLATE 100 MG/1
CAPSULE ORAL
COMMUNITY
End: 2023-04-20

## 2023-04-20 RX ORDER — FOLIC ACID 1 MG/1
TABLET ORAL
COMMUNITY
End: 2023-04-20

## 2023-04-20 RX ORDER — LOSARTAN POTASSIUM 100 MG/1
TABLET ORAL
COMMUNITY
End: 2023-04-20

## 2023-04-20 RX ORDER — TOFACITINIB 11 MG/1
TABLET, FILM COATED, EXTENDED RELEASE ORAL
COMMUNITY
End: 2023-04-20

## 2023-04-20 RX ORDER — CYCLOBENZAPRINE HCL 10 MG
TABLET ORAL
COMMUNITY

## 2023-04-20 RX ORDER — LEVOFLOXACIN 750 MG/1
TABLET, FILM COATED ORAL
COMMUNITY
End: 2023-04-20

## 2023-04-20 ASSESSMENT — FIBROSIS 4 INDEX: FIB4 SCORE: 0.98

## 2023-04-20 NOTE — PROGRESS NOTES
[facility] RHEUMATOLOGY  [address], [address], [place] [address]  Phone: [phone] ? Fax: [phone]    RHEUMATOLOGY FOLLOW-UP VISIT NOTE      DATE OF SERVICE: 04/20/2023         Subjective     PRIMARY CARE PRACTITIONER:  YA Silvestre  [address] [place] [address]    PATIENT IDENTIFICATION:  Angela Winchester  [address]  [place] [address]    YOB: 1945    MEDICAL RECORD NUMBER: [id number]          CHIEF COMPLAINT:   Chief Complaint   Patient presents with    Follow-Up     Seropositive rheumatoid arthritis (HCC)       RHEUMATOLOGIC HISTORY:  Angela Winchester is a 77 y.o. female with pertinent history notable for seropositive rheumatoid arthritis diagnosed in 2011 (symptomatic from 2009), osteoarthritis of multiple joints (hands, shoulders, knees, feet) s/p bilateral knee replacements, and DJD of cervical/thoracic spine. Previously under the care of a local rheumatologist who has retired (Dr. Conor Hull), she initially presented on 8/18/2022 to establish care for continued management of her condition. Noted oral surgery in 5/2022 for which her DMARDs were held for almost a month prior, so had a flare for which Dr. Hull prescribed a 10-day course of prednisone 20mg taper which was very helpful. Reported residual but tolerable joint/muscle pain in her hands, wrists, left shoulder, neck, lower back, knees, ankles and feet. These were associated with up to 1 hour of morning stiffness that improved with activity but her joint pain tended to worsen with much activity over the course of the day. Noted that she had tried Voltaren gel and had steroid injections to her left shoulder and both knees in the past which were not very helpful. Noted that orthopedic surgery recommended left shoulder replacement but she was not inclined to undergoing surgery.     Pertinent treatment history: Prednisone tapers during flares (effective), Remicade (discontinued after 3  infusions due to ineffectiveness), Humira (effective for 8 years, then became ineffective), methotrexate (stopped in 8/2022 due to hair loss and macrocytic anemia), Xeljanz 11 mg SR daily (2020-present, effective), sulfasalazine 500 mg twice daily (8/2022-present, effective).     Pertinent laboratory results: Strongly positive  and anti- (in 7/2019); negative/normal QTB (in 7/2019), HCV (in 11/2018) and HBV (in 10/2019), vitamin D (in 12/2022), ESR, CRP, eGFR, creatinine, LFTs, and unremarkable CBC (on 4/14/23).    INTERVAL HISTORY:  Recent multiple moles on face, upper chest, and back.  Mild intermittent pain in shoulders and neck/cervical region, but otherwise doing quite well.  Feels that her current regimen of Xeljanz and sulfasalazine have been very helpful.    REVIEW OF SYSTEMS:  Except as noted in the history above, relevant review of systems with emphasis on autoimmune inflammatory processes was otherwise negative.      ACTIVE PROBLEM LIST:  Patient Active Problem List    Diagnosis Date Noted    Immunosuppressed status ([facility]) 04/20/2023    Multiple atypical skin moles 04/20/2023    Long-term use of immunosuppressant medication 09/15/2022    Seropositive rheumatoid arthritis ([facility]) 09/15/2022    Primary osteoarthritis involving multiple joints 09/15/2022       PAST MEDICAL HISTORY:  Past Medical History:   Diagnosis Date    Arthritis     Breath shortness     with exertion    Bronchitis 01/2018    Cancer ([facility]) 2009    LUNG CANCER= 8/13/2018 pt states she had lung resection and it was fungal infection, no cancer; Skin    Cataract     Emphysema of lung ([facility])     High cholesterol     Hypertension     Pain     knee/hand    Pneumonia 02/2018    RA (rheumatoid arthritis) ([facility])     Sleep apnea     CPAP with 2.5 liters oxygen    Snoring        PAST SURGICAL HISTORY:  Past Surgical History:   Procedure Laterality Date    KNEE ARTHROPLASTY TOTAL Right 8/27/2018    Procedure: KNEE ARTHROPLASTY TOTAL;  Surgeon:  Fabian Kennedy M.D.;  Location: SURGERY [facility];  Service: Orthopedics    TIBIAL OSTEOTOMY Right 8/27/2018    Procedure: TIBIAL OSTEOTOMY - POSS TUBERCLE;  Surgeon: Fabian Kennedy M.D.;  Location: SURGERY [facility];  Service: Orthopedics    CATARACT PHACO WITH IOL  7/1/2013    Performed by Tyron Quiles M.D. at SURGERY [facility]    CATARACT PHACO WITH IOL  6/17/2013    Performed by Tyron Quiles M.D. at SURGERY [facility]    CARPAL TUNNEL ENDOSCOPIC  2/5/2010    Performed by EREN KOVACS at SURGERY SAME DAY [facility]    TUMOR EXCISION WITH BIOPSY Left 2010    left wrist for skin cancer, done in the office    THORACOSCOPY  6/8/2009    Performed by GANSER, JOHN H at SURGERY [facility]       MEDICATIONS:  Current Outpatient Medications   Medication Sig    amLODIPine (NORVASC) 10 MG Tab amlodipine 10 mg tablet    cyclobenzaprine (FLEXERIL) 10 mg Tab cyclobenzaprine 10 mg tablet    fluticasone (FLONASE) 50 MCG/ACT nasal spray fluticasone propionate 50 mcg/actuation nasal spray,suspension    potassium chloride ER (KLOR-CON) 10 MEQ tablet Take 10 mEq by mouth every day.    furosemide (LASIX) 20 MG Tab TAKE 1/2 TO 1 TABLET BY MOUTH DAILY    metFORMIN (GLUCOPHAGE) 500 MG Tab Take 500 mg by mouth 2 times a day.    TRELEGY ELLIPTA 200-62.5-25 MCG/INH AEROSOL POWDER, BREATH ACTIVATED     Tofacitinib Citrate ER (XELJANZ XR) 11 MG TABLET SR 24 HR Take 1 Tablet by mouth every day.    sulfaSALAzine (AZULFIDINE EN-TAB) 500 MG EC tablet Take 1 Tablet by mouth 2 times a day with meals.    albuterol 108 (90 Base) MCG/ACT Aero Soln inhalation aerosol Inhale 1-2 Puffs by mouth every four hours as needed for Shortness of Breath.    Cholecalciferol (VITAMIN D3) 5000 units Cap Take 1 Cap by mouth every day.    losartan (COZAAR) 100 MG Tab Take 100 mg by mouth every day.    metoprolol SR (TOPROL XL) 25 MG TABLET SR 24 HR Take 25 mg by mouth every day.    ALPRAZolam (XANAX) 0.5 MG Tab  "Take 0.5 mg by mouth at bedtime as needed for Sleep.    atorvastatin (LIPITOR) 20 MG Tab Take 20 mg by mouth every evening.    hydrocodone-acetaminophen (MAXIDONE)  MG per tablet Take 1-2 Tabs by mouth every 6 hours as needed for Mild Pain.       ALLERGIES:   No Known Allergies    IMMUNIZATIONS:  Immunization History   Administered Date(s) Administered    Deepak SARS-CoV-2 Vaccine 03/21/2021, 11/03/2021       SOCIAL HISTORY:   Social History     Socioeconomic History    Marital status:    Tobacco Use    Smoking status: Every Day     Packs/day: 2.00     Years: 42.00     Pack years: 84.00     Types: Cigarettes    Smokeless tobacco: Never    Tobacco comments:     2-3 ppd; 8/13/18 currently 0.5ppd   Vaping Use    Vaping Use: Unknown   Substance and Sexual Activity    Alcohol use: No     Alcohol/week: 0.0 oz    Drug use: No       FAMILY HISTORY:  History reviewed. No pertinent family history.         Objective     Vital Signs: /64 (BP Location: Left arm, Patient Position: Sitting, BP Cuff Size: Adult)   Pulse 73   Temp 36.2 °C (97.1 °F) (Temporal)   Ht 1.626 m (5' 4\")   Wt 73.9 kg (163 lb)   SpO2 94% Body mass index is 27.98 kg/m².    General: Appears well and comfortable  Eyes: No scleral or conjunctival lesions  ENT: No apparent oral or nasal lesions  Head/Neck: No apparent scalp or neck lesions  Cardiovascular: Regular rate and rhythm  Respiratory: Breathing quiet and unlabored  Gastrointestinal: No organomegaly or abdominal masses  Integumentary: Multiple hyperpigmented moles with skin tags on face and upper chest  Musculoskeletal: Minimal tenderness of left shoulder AC/GH joints with restricted range of motion; no significant periarticular soft tissue swelling, warmth, erythema, or overt signs of synovitis  Neurologic: No focal sensory or motor deficits  Psychiatric: Mood and affect appropriate      LABORATORY RESULTS REVIEWED AND INTERPRETED BY ME:  Lab Results   Component Value Date/Time "    SEDRATEWES 15 04/14/2023 12:57 PM    CREACTPROT 0.40 04/14/2023 12:57 PM    URICACID 6.3 03/20/2012 02:49 PM     Lab Results   Component Value Date/Time    RHEUMFACTN 107 (H) 07/02/2019 01:13 PM    CCPANTIBODY 174 (H) 07/02/2019 01:13 PM     Lab Results   Component Value Date/Time    ANTINUCAB None Detected 07/02/2019 01:13 PM     Lab Results   Component Value Date/Time    PROTHROMBTM 10.6 06/04/2009 04:50 PM    INR 0.91 06/04/2009 04:50 PM     Lab Results   Component Value Date/Time    TSHULTRASEN 3.050 01/14/2019 02:03 PM    FREET4 1.24 03/20/2012 02:49 PM     Lab Results   Component Value Date/Time    HEPBSAG Negative 07/02/2019 01:13 PM    HEPBCORIGM Positive (A) 07/02/2019 01:13 PM    HEPBCORTOT Negative 10/01/2019 01:48 PM    HEPCAB Negative 07/02/2019 01:13 PM     Lab Results   Component Value Date/Time    ASTSGOT 17 04/14/2023 12:57 PM    ALTSGPT 20 04/14/2023 12:57 PM    ALKPHOSPHAT 75 04/14/2023 12:57 PM    TBILIRUBIN 0.3 04/14/2023 12:57 PM    TOTPROTEIN 7.4 04/14/2023 12:57 PM    ALBUMIN 4.3 04/14/2023 12:57 PM     Lab Results   Component Value Date/Time    SODIUM 137 04/14/2023 12:57 PM    POTASSIUM 4.9 04/14/2023 12:57 PM    CHLORIDE 100 04/14/2023 12:57 PM    CO2 24 04/14/2023 12:57 PM    GLUCOSE 87 04/14/2023 12:57 PM    BUN 12 04/14/2023 12:57 PM    CREATININE 0.92 04/14/2023 12:57 PM    CALCIUM 9.6 04/14/2023 12:57 PM    MAGNESIUM 1.9 03/31/2016 01:04 PM     Lab Results   Component Value Date/Time    WBC 9.4 04/14/2023 12:57 PM    RBC 4.40 04/14/2023 12:57 PM    HEMOGLOBIN 14.5 04/14/2023 12:57 PM    HEMATOCRIT 43.7 04/14/2023 12:57 PM    MCV 99.3 (H) 04/14/2023 12:57 PM    MCH 33.0 04/14/2023 12:57 PM    MCHC 33.2 (L) 04/14/2023 12:57 PM    RDW 55.2 (H) 04/14/2023 12:57 PM    PLATELETCT 292 04/14/2023 12:57 PM    MPV 9.8 04/14/2023 12:57 PM    NEUTS 5.44 04/14/2023 12:57 PM    LYMPHOCYTES 29.30 04/14/2023 12:57 PM    MONOCYTES 9.40 04/14/2023 12:57 PM    EOSINOPHILS 1.50 04/14/2023 12:57 PM     BASOPHILS 1.00 04/14/2023 12:57 PM    ANISOCYTOSIS 1+ 07/02/2019 01:13 PM     Lab Results   Component Value Date/Time    FERRITIN 175.8 03/31/2016 01:04 PM    IRON 64 03/31/2016 01:04 PM    TRANSFERRIN 216 03/31/2016 01:05 PM    FOLATE >20.00 09/08/2011 11:11 AM     Lab Results   Component Value Date/Time    25HYDROXY 91 12/13/2022 12:31 PM     Lab Results   Component Value Date/Time    COLORURINE DK Yellow 08/13/2018 01:40 PM    SPECGRAVITY 1.021 08/13/2018 01:40 PM    PHURINE 6.0 08/13/2018 01:40 PM    GLUCOSEUR Negative 08/13/2018 01:40 PM    KETONES Negative 08/13/2018 01:40 PM    PROTEINURIN 30 (A) 08/13/2018 01:40 PM     Lab Results   Component Value Date/Time    MICROALBUR 1.6 12/13/2022 12:31 PM    MALBCRT 202 (H) 12/13/2022 12:31 PM     Lab Results   Component Value Date/Time    CHOLSTRLTOT 169 12/13/2022 12:31 PM     (H) 12/13/2022 12:31 PM    HDL 40 12/13/2022 12:31 PM    TRIGLYCERIDE 128 12/13/2022 12:31 PM    HBA1C 6.5 (H) 11/06/2020 03:19 PM       RADIOLOGY RESULTS REVIEWED AND INTERPRETED BY ME:  Results for orders placed during the hospital encounter of 11/20/06    DX-KNEES-AP BILATERAL STANDING    Impression  IMPRESSION:    MILD SYMMETRIC MEDIAL COMPARTMENT FEMOROTIBIAL OSTEOARTHROSIS.  NO  EVIDENCE OF ANY INFLAMMATORY  PROCESS.    Results for orders placed during the hospital encounter of 11/22/11    DX-FOOT-COMPLETE 3+    Impression  1. No no fracture or malalignment.    2. Chronic changes as described in the findings section.    Results for orders placed during the hospital encounter of 11/22/11    DX-ANKLE 3+ VIEWS    Impression  Negative right ankle series.  Diffuse soft tissue swelling.    Results for orders placed during the hospital encounter of 02/14/08    DX-KNEE COMPLETE 4+    Impression  IMPRESSION:    1. NO ACUTE FRACTURE IDENTIFIED.    2. SMALL KNEE JOINT EFFUSION.    3. PERIARTICULAR DEOSSIFICATION AND MILD OSTEOARTHROSIS.    Results for orders placed during the hospital  encounter of 08/27/18    DX-KNEE 2- RIGHT    Impression  1. Status post right total knee replacement without evidence of hardware complication.    Results for orders placed during the hospital encounter of 11/20/06    DX-JOINT SURVEY-FEET SINGLE VIEW    Impression  IMPRESSION:    1. NO EVIDENCE OF INFLAMMATORY ARTHROPATHY.    2. BLAND DEGENERATIVE ARTHROSIS POLYARTICULAR.    Results for orders placed in visit on 08/01/17    DX-HAND 3+ RIGHT    Impression  Osteoarthritis affecting the interphalangeal joints with joint space narrowing, subchondral sclerosis and osteophyte formation.    No fracture or acute bony abnormality.    Results for orders placed during the hospital encounter of 06/03/19    DX-HAND 2- LEFT    Impression  1.  Moderate osteoarthritis.  2.  No evidence for inflammatory arthropathy.  3.  Evidence of old trauma to the distal radial metaphysis.    Results for orders placed during the hospital encounter of 11/20/06    DX-JOINT SURVEY-HANDS SINGLE VIEW    Impression  IMPRESSION:    BLAND DEGENERATIVE OSTEOARTHROSIS OF THE INTERPHALANGEAL JOINTS AND THUMB  BASE.  NO EVIDENCE OF INFLAMMATORY ARTHRITIS.    Results for orders placed during the hospital encounter of 06/03/19    DX-SHOULDER 2+ LEFT    Impression  1.  Severe degenerative change of LEFT shoulder with probable associated joint body.  2.  No fracture or dislocation.    Results for orders placed during the hospital encounter of 04/26/10    DS-BONE DENSITY STUDY (DEXA)    Impression  IMPRESSION:    ACCORDING TO THE WORLD HEALTH ORGANIZATION CLASSIFICATION, BONE MINERAL  DENSITY OF THIS PATIENT IS OSTEOPENIC.    Results for orders placed during the hospital encounter of 04/22/09    CT-CHEST (THORAX) W/O    Impression  IMPRESSION:    1. 11 MM NONCALCIFIED SPICULATED SOFT TISSUE MASS ANTERIORLY IN THE RIGHT  LUNG APEX (IMAGE 30 ).  THIS IS SUSPICIOUS FOR BRONCHOGENIC  CARCINOMA.  GIVEN ITS SIZE, IT WOULD LIKELY BE SENSITIVE TO FDG IMAGING  IF  METABOLICALLY ACTIVE, AS A CANCER WOULD BE EXPECTED TO BE.    2. SMALL NONSPECIFIC MEDIASTINAL ADENOPATHY.    3. EVIDENCE OF PRIOR GRANULOMATOUS EXPOSURE.    Results for orders placed during the hospital encounter of 04/30/09    MR-KNEE-W/O    Impression  IMPRESSION:    1. MACERATED-TYPE TEAR SEEN INVOLVING THE POSTERIOR HORN AND BODY OF THE  LATERAL MENISCUS UNLESS THE PATIENT HAS HAD PRIOR PARTIAL MENISCECTOMY.    2. CHRONIC MILD SPRAIN TO THE ANTERIOR AND POSTERIOR CRUCIATE LIGAMENTS.    3. TRICOMPARTMENT DEGENERATIVE CHANGE WHICH APPEARS TO INVOLVE THE  PATELLOFEMORAL AND THE LATERAL FEMOROTIBIAL ARTICULATIONS TO GREATEST  DEGREE.  SOME DEGENERATIVE CHANGE IS ALSO SEEN INVOLVING THE PROXIMAL  TIBIOFIBULAR ARTICULATION.    4. MODERATE-SIZED JOINT EFFUSION WITH EXTENSIVE SYNOVITIS.    5. LIKELY INTRAARTICULAR OSSIFIC LOOSE BODY SEEN ADJACENT TO THE PROXIMAL  TIBIA POSTERIORLY.    6. MULTIPLE LOW SIGNAL AREAS SEEN WITHIN A MODERATE-SIZED BAKER CYST  WHICH MAY REPRESENT EITHER SYNOVITIS OR EXTRUDED INTRAARTICULAR LOOSE  BODIES.      All relevant laboratory and imaging results reported on this note were reviewed and interpreted by me.         Assessment & Plan     Angela Winchester is a 77 y.o. female with history as noted above whose presentation merits the following diagnostic and clinical status impressions and recommendations:    1. Seropositive rheumatoid arthritis (HCC)  Clinically and serologically quiescent disease well-controlled on the current regimen of Xeljanz and sulfasalazine, so no need for modification of treatment. However, given the potential for discordance between immunologic activity and clinical disease manifestations, need to routinely monitor markers of disease activity to gauge overall trajectory in response to treatment.  - Sed Rate; Future  - CRP QUANTITIVE (NON-CARDIAC); Future  - Continue Xeljanz 11 mg SR daily  - Okay to trial holding sulfasalazine 500 mg twice daily to see if Xeljanz  monotherapy would be sufficient, but resume if symptoms worsen    2. Primary osteoarthritis involving multiple joints  Presumably a significant contributor to her overall joint pain burden which can be managed with topical or oral NSAIDs and analgesics.  - Consider intra-articular steroid injection if that becomes necessary    3. Multiple atypical skin moles  Raises concern for cutaneous neoplasm so need to be evaluated with skin biopsy by dermatology.  - Recommend evaluation by dermatology (reportedly previously seen by a dermatologist and will make an appointment)    4. Immunosuppressed status  Presently with no reported history, physical exam, or laboratory evidence to suggest significant adverse drug effects or opportunistic infections, but need routine monitoring per guidelines.  - CBC WITH DIFFERENTIAL; Future  - Comp Metabolic Panel; Future  - Need to ascertain age-appropriate vaccines      FOLLOW-UP: Return in about 6 months (around 10/20/2023) for Short.         Thank you for the opportunity to participate in the care of Angela Winchester.    Basil Ren MD, MS  Rheumatologist, [facility] Rheumatology ? [facility]   of Clinical Medicine, Department of Internal Medicine  [facility] ? [facility]

## 2023-06-19 ENCOUNTER — TELEPHONE (OUTPATIENT)
Dept: RHEUMATOLOGY | Facility: MEDICAL CENTER | Age: 78
End: 2023-06-19
Payer: MEDICARE

## 2023-06-19 NOTE — TELEPHONE ENCOUNTER
Patient called office with complaints of gout flare up in multiple sites. Patient states in neck, hands, shoulders. Unable to walk or sleep, patient in excruciating pain.  Please advise,

## 2023-06-20 NOTE — TELEPHONE ENCOUNTER
Tell her to resume sulfasalazine 500 mg twice daily as previously instructed to do if symptoms worsened on Xeljanz alone.    Basil Ren M.D.

## 2023-07-03 ENCOUNTER — TELEPHONE (OUTPATIENT)
Dept: RHEUMATOLOGY | Facility: MEDICAL CENTER | Age: 78
End: 2023-07-03
Payer: MEDICARE

## 2023-07-03 DIAGNOSIS — M05.9 SEROPOSITIVE RHEUMATOID ARTHRITIS (HCC): ICD-10-CM

## 2023-07-03 NOTE — TELEPHONE ENCOUNTER
Patient called in with excruciating pain in both hands and shoulders/neck.  She wants to know what she can do to help it.  She is currently on  sulfasalazine 500 mg twice daily but states it is not working.      Thank you   Jessie

## 2023-07-07 RX ORDER — PREDNISONE 5 MG/1
TABLET ORAL
Qty: 126 TABLET | Refills: 0 | Status: SHIPPED | OUTPATIENT
Start: 2023-07-07

## 2023-07-07 NOTE — TELEPHONE ENCOUNTER
Tell her to increase sulfasalazine to 2 tablets (1000 mg) twice a day and be sure that she is taking Xeljanz 11 mg daily as prescribed. In the meantime, I have sent her a prescription for prednisone 20 mg taper to cover for the time it takes for the extra dose of sulfasalazine to kick in.    Basil Ren M.D.

## 2023-07-25 ENCOUNTER — APPOINTMENT (RX ONLY)
Dept: URBAN - METROPOLITAN AREA CLINIC 4 | Facility: CLINIC | Age: 78
Setting detail: DERMATOLOGY
End: 2023-07-25

## 2023-07-25 DIAGNOSIS — Z71.89 OTHER SPECIFIED COUNSELING: ICD-10-CM

## 2023-07-25 DIAGNOSIS — L82.0 INFLAMED SEBORRHEIC KERATOSIS: ICD-10-CM

## 2023-07-25 DIAGNOSIS — L82.1 OTHER SEBORRHEIC KERATOSIS: ICD-10-CM

## 2023-07-25 DIAGNOSIS — Z85.828 PERSONAL HISTORY OF OTHER MALIGNANT NEOPLASM OF SKIN: ICD-10-CM

## 2023-07-25 DIAGNOSIS — L57.0 ACTINIC KERATOSIS: ICD-10-CM

## 2023-07-25 DIAGNOSIS — L81.4 OTHER MELANIN HYPERPIGMENTATION: ICD-10-CM

## 2023-07-25 DIAGNOSIS — D69.2 OTHER NONTHROMBOCYTOPENIC PURPURA: ICD-10-CM

## 2023-07-25 PROCEDURE — ? LIQUID NITROGEN

## 2023-07-25 PROCEDURE — ? COUNSELING

## 2023-07-25 PROCEDURE — ? DIAGNOSIS COMMENT

## 2023-07-25 PROCEDURE — 99203 OFFICE O/P NEW LOW 30 MIN: CPT | Mod: 25

## 2023-07-25 PROCEDURE — 17000 DESTRUCT PREMALG LESION: CPT | Mod: 59

## 2023-07-25 PROCEDURE — 17110 DESTRUCTION B9 LES UP TO 14: CPT

## 2023-07-25 ASSESSMENT — LOCATION DETAILED DESCRIPTION DERM
LOCATION DETAILED: LEFT INFERIOR LATERAL NECK
LOCATION DETAILED: RIGHT INFERIOR MEDIAL MIDBACK
LOCATION DETAILED: RIGHT PROXIMAL DORSAL FOREARM
LOCATION DETAILED: LEFT PROXIMAL POSTERIOR UPPER ARM
LOCATION DETAILED: SUPERIOR THORACIC SPINE
LOCATION DETAILED: LEFT DISTAL DORSAL FOREARM
LOCATION DETAILED: UPPER STERNUM
LOCATION DETAILED: RIGHT NASAL ROOT
LOCATION DETAILED: RIGHT INFERIOR LATERAL NECK
LOCATION DETAILED: RIGHT MEDIAL UPPER BACK
LOCATION DETAILED: LEFT PROXIMAL DORSAL FOREARM
LOCATION DETAILED: RIGHT PROXIMAL POSTERIOR UPPER ARM
LOCATION DETAILED: RIGHT ANTERIOR PROXIMAL UPPER ARM
LOCATION DETAILED: SUPERIOR MID FOREHEAD
LOCATION DETAILED: LEFT KNEE
LOCATION DETAILED: RIGHT LATERAL FOREHEAD
LOCATION DETAILED: RIGHT MEDIAL PROXIMAL PRETIBIAL REGION
LOCATION DETAILED: RIGHT CENTRAL MALAR CHEEK

## 2023-07-25 ASSESSMENT — LOCATION ZONE DERM
LOCATION ZONE: TRUNK
LOCATION ZONE: ARM
LOCATION ZONE: FACE
LOCATION ZONE: NOSE
LOCATION ZONE: NECK
LOCATION ZONE: LEG

## 2023-07-25 ASSESSMENT — LOCATION SIMPLE DESCRIPTION DERM
LOCATION SIMPLE: LEFT POSTERIOR UPPER ARM
LOCATION SIMPLE: UPPER BACK
LOCATION SIMPLE: RIGHT POSTERIOR UPPER ARM
LOCATION SIMPLE: RIGHT UPPER BACK
LOCATION SIMPLE: RIGHT LOWER BACK
LOCATION SIMPLE: RIGHT PRETIBIAL REGION
LOCATION SIMPLE: RIGHT UPPER ARM
LOCATION SIMPLE: RIGHT FOREHEAD
LOCATION SIMPLE: CHEST
LOCATION SIMPLE: RIGHT FOREARM
LOCATION SIMPLE: RIGHT CHEEK
LOCATION SIMPLE: LEFT KNEE
LOCATION SIMPLE: SUPERIOR FOREHEAD
LOCATION SIMPLE: LEFT FOREARM
LOCATION SIMPLE: NOSE
LOCATION SIMPLE: LEFT ANTERIOR NECK
LOCATION SIMPLE: RIGHT ANTERIOR NECK

## 2023-07-25 NOTE — PROCEDURE: COUNSELING
Detail Level: Simple
Sunscreen Recommendations: Recommend sunscreen daily, or sun protective clothing.  I recommend a zinc or titanium based sunscreen with a spf of 30 or greater.
Detail Level: Zone
Detail Level: Detailed

## 2023-07-25 NOTE — PROCEDURE: DIAGNOSIS COMMENT
Render Risk Assessment In Note?: no
Comment: States she noticed a increase in SKs in a 3 year period
Detail Level: Simple

## 2023-07-25 NOTE — PROCEDURE: LIQUID NITROGEN
Spray Paint Technique: No
Show Aperture Variable?: Yes
Medical Necessity Information: It is in your best interest to select a reason for this procedure from the list below. All of these items fulfill various CMS LCD requirements except the new and changing color options.
Detail Level: Detailed
Medical Necessity Clause: This procedure was medically necessary because the lesions that were treated were:
Spray Paint Text: The liquid nitrogen was applied to the skin utilizing a spray paint frosting technique.
Post-Care Instructions: I reviewed with the patient in detail post-care instructions. Patient is to wear sunprotection, and avoid picking at any of the treated lesions. Pt may apply Vaseline to crusted or scabbing areas.
Consent: The patient's consent was obtained including but not limited to risks of crusting, scabbing, blistering, scarring, darker or lighter pigmentary change, recurrence, incomplete removal and infection.
Duration Of Freeze Thaw-Cycle (Seconds): 0
Number Of Freeze-Thaw Cycles: 3 freeze-thaw cycles

## 2023-07-30 DIAGNOSIS — M05.9 SEROPOSITIVE RHEUMATOID ARTHRITIS (HCC): ICD-10-CM

## 2023-08-01 RX ORDER — SULFASALAZINE 500 MG/1
500 TABLET, DELAYED RELEASE ORAL 2 TIMES DAILY WITH MEALS
Qty: 180 TABLET | Refills: 3 | Status: SHIPPED | OUTPATIENT
Start: 2023-08-01 | End: 2023-08-07 | Stop reason: SDUPTHER

## 2023-08-01 NOTE — TELEPHONE ENCOUNTER
Medication refill for sulfasalazine received. Labs reviewed and medication refilled. Patient has labs previously ordered and recommend completion prior to next visit/refill.

## 2023-08-07 DIAGNOSIS — M05.9 SEROPOSITIVE RHEUMATOID ARTHRITIS (HCC): ICD-10-CM

## 2023-08-07 RX ORDER — SULFASALAZINE 500 MG/1
1000 TABLET, DELAYED RELEASE ORAL 2 TIMES DAILY WITH MEALS
Qty: 180 TABLET | Refills: 3 | Status: SHIPPED | OUTPATIENT
Start: 2023-08-07 | End: 2024-01-24

## 2023-08-18 DIAGNOSIS — M05.9 SEROPOSITIVE RHEUMATOID ARTHRITIS (HCC): ICD-10-CM

## 2023-08-22 RX ORDER — TOFACITINIB 11 MG/1
1 TABLET, FILM COATED, EXTENDED RELEASE ORAL DAILY
Qty: 60 TABLET | Refills: 5 | Status: SHIPPED | OUTPATIENT
Start: 2023-08-22

## 2023-10-11 ENCOUNTER — HOSPITAL ENCOUNTER (OUTPATIENT)
Dept: LAB | Facility: MEDICAL CENTER | Age: 78
End: 2023-10-11
Attending: STUDENT IN AN ORGANIZED HEALTH CARE EDUCATION/TRAINING PROGRAM
Payer: MEDICARE

## 2023-10-11 ENCOUNTER — HOSPITAL ENCOUNTER (OUTPATIENT)
Dept: LAB | Facility: MEDICAL CENTER | Age: 78
End: 2023-10-11
Attending: NURSE PRACTITIONER
Payer: MEDICARE

## 2023-10-11 DIAGNOSIS — D84.9 IMMUNOSUPPRESSED STATUS (HCC): ICD-10-CM

## 2023-10-11 DIAGNOSIS — M05.9 SEROPOSITIVE RHEUMATOID ARTHRITIS (HCC): ICD-10-CM

## 2023-10-11 LAB
25(OH)D3 SERPL-MCNC: 60 NG/ML (ref 30–100)
ALBUMIN SERPL BCP-MCNC: 4.4 G/DL (ref 3.2–4.9)
ALBUMIN SERPL BCP-MCNC: 4.4 G/DL (ref 3.2–4.9)
ALBUMIN/GLOB SERPL: 1.5 G/DL
ALBUMIN/GLOB SERPL: 1.5 G/DL
ALP SERPL-CCNC: 79 U/L (ref 30–99)
ALP SERPL-CCNC: 80 U/L (ref 30–99)
ALT SERPL-CCNC: 13 U/L (ref 2–50)
ALT SERPL-CCNC: 9 U/L (ref 2–50)
ANION GAP SERPL CALC-SCNC: 13 MMOL/L (ref 7–16)
ANION GAP SERPL CALC-SCNC: 14 MMOL/L (ref 7–16)
AST SERPL-CCNC: 13 U/L (ref 12–45)
AST SERPL-CCNC: 14 U/L (ref 12–45)
BASOPHILS # BLD AUTO: 1 % (ref 0–1.8)
BASOPHILS # BLD AUTO: 1 % (ref 0–1.8)
BASOPHILS # BLD: 0.1 K/UL (ref 0–0.12)
BASOPHILS # BLD: 0.1 K/UL (ref 0–0.12)
BILIRUB SERPL-MCNC: 0.3 MG/DL (ref 0.1–1.5)
BILIRUB SERPL-MCNC: 0.3 MG/DL (ref 0.1–1.5)
BUN SERPL-MCNC: 12 MG/DL (ref 8–22)
BUN SERPL-MCNC: 13 MG/DL (ref 8–22)
CALCIUM ALBUM COR SERPL-MCNC: 9.5 MG/DL (ref 8.5–10.5)
CALCIUM ALBUM COR SERPL-MCNC: 9.5 MG/DL (ref 8.5–10.5)
CALCIUM SERPL-MCNC: 9.8 MG/DL (ref 8.5–10.5)
CALCIUM SERPL-MCNC: 9.8 MG/DL (ref 8.5–10.5)
CHLORIDE SERPL-SCNC: 97 MMOL/L (ref 96–112)
CHLORIDE SERPL-SCNC: 98 MMOL/L (ref 96–112)
CHOLEST SERPL-MCNC: 165 MG/DL (ref 100–199)
CO2 SERPL-SCNC: 25 MMOL/L (ref 20–33)
CO2 SERPL-SCNC: 26 MMOL/L (ref 20–33)
CREAT SERPL-MCNC: 0.79 MG/DL (ref 0.5–1.4)
CREAT SERPL-MCNC: 0.79 MG/DL (ref 0.5–1.4)
CREAT UR-MCNC: 8.86 MG/DL
CRP SERPL HS-MCNC: 0.6 MG/DL (ref 0–0.75)
EOSINOPHIL # BLD AUTO: 0.14 K/UL (ref 0–0.51)
EOSINOPHIL # BLD AUTO: 0.15 K/UL (ref 0–0.51)
EOSINOPHIL NFR BLD: 1.4 % (ref 0–6.9)
EOSINOPHIL NFR BLD: 1.5 % (ref 0–6.9)
ERYTHROCYTE [DISTWIDTH] IN BLOOD BY AUTOMATED COUNT: 53.4 FL (ref 35.9–50)
ERYTHROCYTE [DISTWIDTH] IN BLOOD BY AUTOMATED COUNT: 53.6 FL (ref 35.9–50)
ERYTHROCYTE [SEDIMENTATION RATE] IN BLOOD BY WESTERGREN METHOD: 22 MM/HOUR (ref 0–25)
FASTING STATUS PATIENT QL REPORTED: NORMAL
GFR SERPLBLD CREATININE-BSD FMLA CKD-EPI: 77 ML/MIN/1.73 M 2
GFR SERPLBLD CREATININE-BSD FMLA CKD-EPI: 77 ML/MIN/1.73 M 2
GLOBULIN SER CALC-MCNC: 2.9 G/DL (ref 1.9–3.5)
GLOBULIN SER CALC-MCNC: 3 G/DL (ref 1.9–3.5)
GLUCOSE SERPL-MCNC: 94 MG/DL (ref 65–99)
GLUCOSE SERPL-MCNC: 96 MG/DL (ref 65–99)
HCT VFR BLD AUTO: 41.9 % (ref 37–47)
HCT VFR BLD AUTO: 42.1 % (ref 37–47)
HDLC SERPL-MCNC: 49 MG/DL
HGB BLD-MCNC: 14.2 G/DL (ref 12–16)
HGB BLD-MCNC: 14.4 G/DL (ref 12–16)
IMM GRANULOCYTES # BLD AUTO: 0.1 K/UL (ref 0–0.11)
IMM GRANULOCYTES # BLD AUTO: 0.12 K/UL (ref 0–0.11)
IMM GRANULOCYTES NFR BLD AUTO: 1 % (ref 0–0.9)
IMM GRANULOCYTES NFR BLD AUTO: 1.2 % (ref 0–0.9)
LDLC SERPL CALC-MCNC: 82 MG/DL
LYMPHOCYTES # BLD AUTO: 2.27 K/UL (ref 1–4.8)
LYMPHOCYTES # BLD AUTO: 2.27 K/UL (ref 1–4.8)
LYMPHOCYTES NFR BLD: 22.1 % (ref 22–41)
LYMPHOCYTES NFR BLD: 22.2 % (ref 22–41)
MCH RBC QN AUTO: 32.3 PG (ref 27–33)
MCH RBC QN AUTO: 33.3 PG (ref 27–33)
MCHC RBC AUTO-ENTMCNC: 33.7 G/DL (ref 32.2–35.5)
MCHC RBC AUTO-ENTMCNC: 34.4 G/DL (ref 32.2–35.5)
MCV RBC AUTO: 95.9 FL (ref 81.4–97.8)
MCV RBC AUTO: 96.8 FL (ref 81.4–97.8)
MICROALBUMIN UR-MCNC: 2.2 MG/DL
MICROALBUMIN/CREAT UR: 248 MG/G (ref 0–30)
MONOCYTES # BLD AUTO: 1.16 K/UL (ref 0–0.85)
MONOCYTES # BLD AUTO: 1.19 K/UL (ref 0–0.85)
MONOCYTES NFR BLD AUTO: 11.3 % (ref 0–13.4)
MONOCYTES NFR BLD AUTO: 11.6 % (ref 0–13.4)
NEUTROPHILS # BLD AUTO: 6.42 K/UL (ref 1.82–7.42)
NEUTROPHILS # BLD AUTO: 6.47 K/UL (ref 1.82–7.42)
NEUTROPHILS NFR BLD: 62.6 % (ref 44–72)
NEUTROPHILS NFR BLD: 63.1 % (ref 44–72)
NRBC # BLD AUTO: 0 K/UL
NRBC # BLD AUTO: 0 K/UL
NRBC BLD-RTO: 0 /100 WBC (ref 0–0.2)
NRBC BLD-RTO: 0 /100 WBC (ref 0–0.2)
PLATELET # BLD AUTO: 303 K/UL (ref 164–446)
PLATELET # BLD AUTO: 314 K/UL (ref 164–446)
PMV BLD AUTO: 9 FL (ref 9–12.9)
PMV BLD AUTO: 9.2 FL (ref 9–12.9)
POTASSIUM SERPL-SCNC: 4.8 MMOL/L (ref 3.6–5.5)
POTASSIUM SERPL-SCNC: 4.8 MMOL/L (ref 3.6–5.5)
PROT SERPL-MCNC: 7.3 G/DL (ref 6–8.2)
PROT SERPL-MCNC: 7.4 G/DL (ref 6–8.2)
RBC # BLD AUTO: 4.33 M/UL (ref 4.2–5.4)
RBC # BLD AUTO: 4.39 M/UL (ref 4.2–5.4)
SODIUM SERPL-SCNC: 136 MMOL/L (ref 135–145)
SODIUM SERPL-SCNC: 137 MMOL/L (ref 135–145)
TRIGL SERPL-MCNC: 169 MG/DL (ref 0–149)
TSH SERPL DL<=0.005 MIU/L-ACNC: 5.64 UIU/ML (ref 0.38–5.33)
WBC # BLD AUTO: 10.2 K/UL (ref 4.8–10.8)
WBC # BLD AUTO: 10.3 K/UL (ref 4.8–10.8)

## 2023-10-11 PROCEDURE — 85652 RBC SED RATE AUTOMATED: CPT

## 2023-10-11 PROCEDURE — 85025 COMPLETE CBC W/AUTO DIFF WBC: CPT | Mod: 91

## 2023-10-11 PROCEDURE — 82570 ASSAY OF URINE CREATININE: CPT

## 2023-10-11 PROCEDURE — 36415 COLL VENOUS BLD VENIPUNCTURE: CPT

## 2023-10-11 PROCEDURE — 82043 UR ALBUMIN QUANTITATIVE: CPT

## 2023-10-11 PROCEDURE — 85025 COMPLETE CBC W/AUTO DIFF WBC: CPT

## 2023-10-11 PROCEDURE — 80053 COMPREHEN METABOLIC PANEL: CPT

## 2023-10-11 PROCEDURE — 80061 LIPID PANEL: CPT

## 2023-10-11 PROCEDURE — 84443 ASSAY THYROID STIM HORMONE: CPT

## 2023-10-11 PROCEDURE — 86140 C-REACTIVE PROTEIN: CPT

## 2023-10-11 PROCEDURE — 80053 COMPREHEN METABOLIC PANEL: CPT | Mod: 91

## 2023-10-11 PROCEDURE — 82306 VITAMIN D 25 HYDROXY: CPT

## 2023-10-23 ENCOUNTER — OFFICE VISIT (OUTPATIENT)
Dept: RHEUMATOLOGY | Facility: MEDICAL CENTER | Age: 78
End: 2023-10-23
Attending: STUDENT IN AN ORGANIZED HEALTH CARE EDUCATION/TRAINING PROGRAM
Payer: MEDICARE

## 2023-10-23 VITALS
DIASTOLIC BLOOD PRESSURE: 74 MMHG | HEART RATE: 74 BPM | HEIGHT: 64 IN | WEIGHT: 157 LBS | BODY MASS INDEX: 26.8 KG/M2 | OXYGEN SATURATION: 93 % | SYSTOLIC BLOOD PRESSURE: 152 MMHG | TEMPERATURE: 98.1 F

## 2023-10-23 DIAGNOSIS — M15.9 PRIMARY OSTEOARTHRITIS INVOLVING MULTIPLE JOINTS: ICD-10-CM

## 2023-10-23 DIAGNOSIS — M05.9 SEROPOSITIVE RHEUMATOID ARTHRITIS (HCC): ICD-10-CM

## 2023-10-23 DIAGNOSIS — D22.9 MULTIPLE ATYPICAL SKIN MOLES: ICD-10-CM

## 2023-10-23 DIAGNOSIS — D84.9 IMMUNOSUPPRESSED STATUS (HCC): ICD-10-CM

## 2023-10-23 PROCEDURE — 3078F DIAST BP <80 MM HG: CPT | Performed by: STUDENT IN AN ORGANIZED HEALTH CARE EDUCATION/TRAINING PROGRAM

## 2023-10-23 PROCEDURE — 3077F SYST BP >= 140 MM HG: CPT | Performed by: STUDENT IN AN ORGANIZED HEALTH CARE EDUCATION/TRAINING PROGRAM

## 2023-10-23 PROCEDURE — 99214 OFFICE O/P EST MOD 30 MIN: CPT | Performed by: STUDENT IN AN ORGANIZED HEALTH CARE EDUCATION/TRAINING PROGRAM

## 2023-10-23 PROCEDURE — 99212 OFFICE O/P EST SF 10 MIN: CPT | Performed by: STUDENT IN AN ORGANIZED HEALTH CARE EDUCATION/TRAINING PROGRAM

## 2023-10-23 ASSESSMENT — PATIENT HEALTH QUESTIONNAIRE - PHQ9: CLINICAL INTERPRETATION OF PHQ2 SCORE: 0

## 2023-10-23 ASSESSMENT — FIBROSIS 4 INDEX: FIB4 SCORE: 0.97

## 2023-10-23 NOTE — PATIENT INSTRUCTIONS
AFTER VISIT INSTRUCTIONS    Below are important information to help you navigate your healthcare needs and help us serve you safely and effectively:  If laboratory tests and/or imaging studies were ordered, remember to go get them done as instructed.  If new prescriptions and/or refills were sent, remember to go pick them up from your local pharmacy, or call the specialty pharmacy to request shipment.  Always take your prescription medications exactly as prescribed unless instructed otherwise.  Note that antirheumatic drugs and steroids are immunosuppressive which means they increase your risk of infections and have multiple potential adverse effects on various organ systems in your body, though most of them are uncommon.  It is important that you are up-to-date on age-appropriate immunizations, particularly shingles and bacterial/viral pneumonia vaccines, which you can request from me or your primary care provider.  Be sure to read the drug package inserts to learn about the potential side effects of your medications before you start taking them.  If you experience any significant drug side effects, stop taking the medication and notify me promptly, and depending on the severity of the side effects, consider going to an urgent care or emergency department for immediate attention.  If there are significant findings on your lab tests and imaging studies that warrant further action, I will notify you with explanations via [employer] or phone call, otherwise you can view them on [employer] and let me know if you have any questions.  Note that [employer] messages are typically read during office hours and may take 1-7 business days before a response depending on the urgency of the situation and how busy my clinic schedule is.  In general, [employer] messaging is for non-urgent matters that do not require immediate attention, so for urgent matters that cannot wait, you are advised to go to an urgent care.  Lastly, you are granted  [employer] access to my documentation of your visit and are encouraged to read my note which details my assessment and plan for your condition.  To learn more about your condition and rheumatic diseases evaluated and treated by rheumatologists, as well as gain access to many helpful resources about these diseases, visit our website at [email or web address].

## 2023-10-23 NOTE — PROGRESS NOTES
[facility] RHEUMATOLOGY  [address], [address], [place] [address]  Phone: [phone] ? Fax: [phone]  Website: [email or web address].Design A/Health-Services/Rheumatology    FOLLOW-UP VISIT NOTE      DATE OF SERVICE: 10/23/2023         Subjective     PRIMARY CARE PRACTITIONER:  YA Silvestre  [address] [id number]    PATIENT IDENTIFICATION:  Angela Winchester  [address]  [place] [address]    YOB: 1945    MEDICAL RECORD NUMBER: [id number]          CHIEF COMPLAINT:   Chief Complaint   Patient presents with    Follow-Up     Seropositive rheumatoid arthritis (HCC)       RHEUMATOLOGIC HISTORY:  Angela Winchester is a 78 y.o. female with pertinent history notable for seropositive rheumatoid arthritis diagnosed in 2011 (symptomatic from 2009), osteoarthritis of multiple joints (hands, shoulders, knees, feet) s/p bilateral knee replacements, and DJD of cervical/thoracic spine. Previously under the care of a local rheumatologist who has retired (Dr. Conor Hull), she initially presented on 8/18/2022 to establish care for continued management of her condition. Noted oral surgery in 5/2022 for which her DMARDs were held for almost a month prior, so had a flare for which Dr. uHll prescribed a 10-day course of prednisone 20mg taper which was very helpful. Reported residual but tolerable joint/muscle pain in her hands, wrists, left shoulder, neck, lower back, knees, ankles and feet. These were associated with up to 1 hour of morning stiffness that improved with activity but her joint pain tended to worsen with much activity over the course of the day. Noted that she had tried Voltaren gel and had steroid injections to her left shoulder and both knees in the past which were not very helpful. Noted that orthopedic surgery recommended left shoulder replacement but she was not inclined to undergoing surgery.     Pertinent treatment history: Prednisone 20 mg tapers (on/off, most recent  7/2023, effective), Remicade (discontinued after 3 infusions due to ineffectiveness), Humira (effective for 8 years, then became ineffective), methotrexate (stopped in 8/2022 due to hair loss and macrocytic anemia), Xeljanz 11 mg SR daily (2020-present, effective), sulfasalazine 500>1000 mg twice daily (started 8/2022, dose increased 7/2023-present, effective).     Pertinent positive labs: Positive anti- and  (in 7/2019).    Pertinent negative labs: Negative HCV (in 11/2018) and HBV (in 10/2019), QTB (in 7/2019), vitamin D, CRP, ESR, eGFR, creatinine, LFTs, and CBC (in 10/2023).    Pertinent XR imaging: Hands and feet (in 6/2019) with moderate osteoarthritis but no evidence of inflammatory arthropathy. Shoulders (in 6/2019) with severe osteoarthritis of left GH joint, and mild bilateral osteoarthritis of AC joint.      INTERVAL HISTORY:  Reports interval history as noted on the questionnaire below or scanned under media tab.  Notably had a flare in 9/2023 with joint pain in her hands, feet, ankles, knees, shoulders, and neck.  Felt better with a course of previously prescribed prednisone 20 mg and increasing sulfasalazine to 1000 mg.  Presently doing much better with no significant joint pain.    REVIEW OF SYSTEMS:  Except as noted in the history above, relevant review of systems with emphasis on autoimmune rheumatic diseases was otherwise negative.      ACTIVE PROBLEM LIST:  Patient Active Problem List    Diagnosis Date Noted    Immunosuppressed status ([facility]) 04/20/2023    Multiple atypical skin moles 04/20/2023    Long-term use of immunosuppressant medication 09/15/2022    Seropositive rheumatoid arthritis (HCC) 09/15/2022    Primary osteoarthritis involving multiple joints (hands, shoulders, knees, and feet) 09/15/2022       PAST MEDICAL HISTORY:  Past Medical History:   Diagnosis Date    Arthritis     Breath shortness     with exertion    Bronchitis 01/2018    Cancer ([facility]) 2009    LUNG CANCER=  8/13/2018 pt states she had lung resection and it was fungal infection, no cancer; Skin    Cataract     Emphysema of lung (HCC)     High cholesterol     Hypertension     Pain     knee/hand    Pneumonia 02/2018    RA (rheumatoid arthritis) (HCC)     Sleep apnea     CPAP with 2.5 liters oxygen    Snoring        PAST SURGICAL HISTORY:  Past Surgical History:   Procedure Laterality Date    KNEE ARTHROPLASTY TOTAL Right 8/27/2018    Procedure: KNEE ARTHROPLASTY TOTAL;  Surgeon: Fabian Kennedy M.D.;  Location: SURGERY [facility];  Service: Orthopedics    TIBIAL OSTEOTOMY Right 8/27/2018    Procedure: TIBIAL OSTEOTOMY - POSS TUBERCLE;  Surgeon: Fabian Kennedy M.D.;  Location: SURGERY [facility];  Service: Orthopedics    CATARACT PHACO WITH IOL  7/1/2013    Performed by Tyron Quiles M.D. at SURGERY [facility]    CATARACT PHACO WITH IOL  6/17/2013    Performed by Tyron Quiles M.D. at SURGERY [facility]    CARPAL TUNNEL ENDOSCOPIC  2/5/2010    Performed by EREN KOVACS at SURGERY SAME DAY [facility]    TUMOR EXCISION WITH BIOPSY Left 2010    left wrist for skin cancer, done in the office    THORACOSCOPY  6/8/2009    Performed by GANSER, JOHN H at SURGERY [facility]       MEDICATIONS:  Current Outpatient Medications   Medication Sig    XELJANZ XR 11 MG TABLET SR 24 HR TAKE 1 TABLET DAILY    sulfaSALAzine (AZULFIDINE EN-TAB) 500 MG EC tablet Take 2 Tablets by mouth 2 times a day with meals.    amLODIPine (NORVASC) 10 MG Tab amlodipine 10 mg tablet    cyclobenzaprine (FLEXERIL) 10 mg Tab cyclobenzaprine 10 mg tablet    fluticasone (FLONASE) 50 MCG/ACT nasal spray fluticasone propionate 50 mcg/actuation nasal spray,suspension    potassium chloride ER (KLOR-CON) 10 MEQ tablet Take 10 mEq by mouth every day.    furosemide (LASIX) 20 MG Tab TAKE 1/2 TO 1 TABLET BY MOUTH DAILY    metFORMIN (GLUCOPHAGE) 500 MG Tab Take 500 mg by mouth 2 times a day.    TRELEGY ELLIPTA 200-62.5-25  "MCG/INH AEROSOL POWDER, BREATH ACTIVATED     Cholecalciferol (VITAMIN D3) 5000 units Cap Take 1 Cap by mouth every day.    losartan (COZAAR) 100 MG Tab Take 100 mg by mouth every day.    metoprolol SR (TOPROL XL) 25 MG TABLET SR 24 HR Take 25 mg by mouth every day.    ALPRAZolam (XANAX) 0.5 MG Tab Take 0.5 mg by mouth at bedtime as needed for Sleep.    atorvastatin (LIPITOR) 20 MG Tab Take 20 mg by mouth every evening.    hydrocodone-acetaminophen (MAXIDONE)  MG per tablet Take 1-2 Tabs by mouth every 6 hours as needed for Mild Pain.    predniSONE (DELTASONE) 5 MG Tab Take 20 mg daily for 7 days, then decrease by 2.5 mg every 7 days until finished. Take with food.    albuterol 108 (90 Base) MCG/ACT Aero Soln inhalation aerosol Inhale 1-2 Puffs by mouth every four hours as needed for Shortness of Breath.       ALLERGIES:   No Known Allergies    IMMUNIZATIONS:  Immunization History   Administered Date(s) Administered    Deepak SARS-CoV-2 Vaccine 03/21/2021, 11/03/2021       SOCIAL HISTORY:   Social History     Socioeconomic History    Marital status:    Tobacco Use    Smoking status: Every Day     Current packs/day: 2.00     Average packs/day: 2.0 packs/day for 42.0 years (84.0 ttl pk-yrs)     Types: Cigarettes    Smokeless tobacco: Never    Tobacco comments:     2-3 ppd; 8/13/18 currently 0.5ppd   Vaping Use    Vaping Use: Unknown   Substance and Sexual Activity    Alcohol use: No     Alcohol/week: 0.0 oz    Drug use: No       FAMILY HISTORY:  No family history on file.         Objective     Vital Signs: BP (!) 152/74 (BP Location: Left arm, Patient Position: Sitting, BP Cuff Size: Adult)   Pulse 74   Temp 36.7 °C (98.1 °F) (Temporal)   Ht 1.626 m (5' 4\")   Wt 71.2 kg (157 lb)   SpO2 93% Body mass index is 26.95 kg/m².    General: Appears well and comfortable  Eyes: No scleral or conjunctival lesions  ENT: No apparent oral or nasal lesions  Head/Neck: No apparent scalp or neck " lesions  Cardiovascular: Regular rate and rhythm  Respiratory: Breathing quiet and unlabored  Gastrointestinal: No apparent organomegaly or abdominal masses  Integumentary: Multiple hyperpigmented moles with skin tags on face and upper chest  Musculoskeletal: Minimal tenderness of left shoulder AC and GH joints with restricted range of motion; no definite periarticular soft tissue swelling, warmth, erythema, or overt synovitis at any joints  Neurologic: No focal sensory or motor deficits  Psychiatric: Mood and affect appropriate      LABORATORY RESULTS REVIEWED AND INTERPRETED BY ME:  Lab Results   Component Value Date/Time    CREACTPROT 0.60 10/11/2023 10:20 AM    SEDRATEWES 22 10/11/2023 10:20 AM    URICACID 6.3 03/20/2012 02:49 PM     Lab Results   Component Value Date/Time    RHEUMFACTN 107 (H) 07/02/2019 01:13 PM    CCPANTIBODY 174 (H) 07/02/2019 01:13 PM     Lab Results   Component Value Date/Time    ANTINUCAB None Detected 07/02/2019 01:13 PM     Lab Results   Component Value Date/Time    TSHULTRASEN 5.640 (H) 10/11/2023 10:26 AM    FREET4 1.24 03/20/2012 02:49 PM     Lab Results   Component Value Date/Time    25HYDROXY 60 10/11/2023 10:26 AM     Lab Results   Component Value Date/Time    FERRITIN 175.8 03/31/2016 01:04 PM    IRON 64 03/31/2016 01:04 PM    TRANSFERRIN 216 03/31/2016 01:05 PM    FOLATE >20.00 09/08/2011 11:11 AM    PROTHROMBTM 10.6 06/04/2009 04:50 PM    INR 0.91 06/04/2009 04:50 PM     Lab Results   Component Value Date/Time    WBC 10.2 10/11/2023 10:26 AM    RBC 4.39 10/11/2023 10:26 AM    HEMOGLOBIN 14.2 10/11/2023 10:26 AM    HEMATOCRIT 42.1 10/11/2023 10:26 AM    MCV 95.9 10/11/2023 10:26 AM    MCH 32.3 10/11/2023 10:26 AM    MCHC 33.7 10/11/2023 10:26 AM    RDW 53.4 (H) 10/11/2023 10:26 AM    PLATELETCT 303 10/11/2023 10:26 AM    MPV 9.0 10/11/2023 10:26 AM    NEUTS 6.47 10/11/2023 10:26 AM    LYMPHOCYTES 22.20 10/11/2023 10:26 AM    MONOCYTES 11.30 10/11/2023 10:26 AM    EOSINOPHILS  1.40 10/11/2023 10:26 AM    BASOPHILS 1.00 10/11/2023 10:26 AM    ANISOCYTOSIS 1+ 07/02/2019 01:13 PM     Lab Results   Component Value Date/Time    ASTSGOT 14 10/11/2023 10:26 AM    ALTSGPT 13 10/11/2023 10:26 AM    ALKPHOSPHAT 80 10/11/2023 10:26 AM    TBILIRUBIN 0.3 10/11/2023 10:26 AM    TOTPROTEIN 7.4 10/11/2023 10:26 AM    ALBUMIN 4.4 10/11/2023 10:26 AM     Lab Results   Component Value Date/Time    SODIUM 136 10/11/2023 10:26 AM    POTASSIUM 4.8 10/11/2023 10:26 AM    CHLORIDE 97 10/11/2023 10:26 AM    CO2 26 10/11/2023 10:26 AM    GLUCOSE 96 10/11/2023 10:26 AM    BUN 13 10/11/2023 10:26 AM    CREATININE 0.79 10/11/2023 10:26 AM    CALCIUM 9.8 10/11/2023 10:26 AM    MAGNESIUM 1.9 03/31/2016 01:04 PM     Lab Results   Component Value Date/Time    MICROALBUR 2.2 10/11/2023 10:26 AM    MALBCRT 248 (H) 10/11/2023 10:26 AM     Lab Results   Component Value Date/Time    COLORURINE DK Yellow 08/13/2018 01:40 PM    SPECGRAVITY 1.021 08/13/2018 01:40 PM    PHURINE 6.0 08/13/2018 01:40 PM    GLUCOSEUR Negative 08/13/2018 01:40 PM    KETONES Negative 08/13/2018 01:40 PM    PROTEINURIN 30 (A) 08/13/2018 01:40 PM     Lab Results   Component Value Date/Time    HEPBSAG Negative 07/02/2019 01:13 PM    HEPBCORIGM Positive (A) 07/02/2019 01:13 PM    HEPBCORTOT Negative 10/01/2019 01:48 PM    HEPCAB Negative 07/02/2019 01:13 PM     Lab Results   Component Value Date/Time    CHOLSTRLTOT 165 10/11/2023 10:26 AM    LDL 82 10/11/2023 10:26 AM    HDL 49 10/11/2023 10:26 AM    TRIGLYCERIDE 169 (H) 10/11/2023 10:26 AM    HBA1C 6.5 (H) 11/06/2020 03:19 PM       RADIOLOGY RESULTS REVIEWED AND INTERPRETED BY ME:  Results for orders placed during the hospital encounter of 11/20/06    DX-KNEES-AP BILATERAL STANDING    Impression  IMPRESSION:    MILD SYMMETRIC MEDIAL COMPARTMENT FEMOROTIBIAL OSTEOARTHROSIS.  NO  EVIDENCE OF ANY INFLAMMATORY  PROCESS.    Results for orders placed during the hospital encounter of  11/22/11    DX-FOOT-COMPLETE 3+    Impression  1. No no fracture or malalignment.    2. Chronic changes as described in the findings section.    Results for orders placed during the hospital encounter of 11/22/11    DX-ANKLE 3+ VIEWS    Impression  Negative right ankle series.  Diffuse soft tissue swelling.    Results for orders placed during the hospital encounter of 02/14/08    DX-KNEE COMPLETE 4+    Impression  IMPRESSION:    1. NO ACUTE FRACTURE IDENTIFIED.    2. SMALL KNEE JOINT EFFUSION.    3. PERIARTICULAR DEOSSIFICATION AND MILD OSTEOARTHROSIS.    Results for orders placed during the hospital encounter of 08/27/18    DX-KNEE 2- RIGHT    Impression  1. Status post right total knee replacement without evidence of hardware complication.    Results for orders placed during the hospital encounter of 11/20/06    DX-JOINT SURVEY-FEET SINGLE VIEW    Impression  IMPRESSION:    1. NO EVIDENCE OF INFLAMMATORY ARTHROPATHY.    2. BLAND DEGENERATIVE ARTHROSIS POLYARTICULAR.    Results for orders placed in visit on 08/01/17    DX-HAND 3+ RIGHT    Impression  Osteoarthritis affecting the interphalangeal joints with joint space narrowing, subchondral sclerosis and osteophyte formation.    No fracture or acute bony abnormality.    Results for orders placed during the hospital encounter of 06/03/19    DX-HAND 2- LEFT    Impression  1.  Moderate osteoarthritis.  2.  No evidence for inflammatory arthropathy.  3.  Evidence of old trauma to the distal radial metaphysis.    Results for orders placed during the hospital encounter of 11/20/06    DX-JOINT SURVEY-HANDS SINGLE VIEW    Impression  IMPRESSION:    BLAND DEGENERATIVE OSTEOARTHROSIS OF THE INTERPHALANGEAL JOINTS AND THUMB  BASE.  NO EVIDENCE OF INFLAMMATORY ARTHRITIS.    Results for orders placed during the hospital encounter of 06/03/19    DX-SHOULDER 2+ LEFT    Impression  1.  Severe degenerative change of LEFT shoulder with probable associated joint body.  2.  No fracture  or dislocation.    Results for orders placed during the hospital encounter of 04/26/10    DS-BONE DENSITY STUDY (DEXA)    Impression  IMPRESSION:    ACCORDING TO THE WORLD HEALTH ORGANIZATION CLASSIFICATION, BONE MINERAL  DENSITY OF THIS PATIENT IS OSTEOPENIC.    Results for orders placed during the hospital encounter of 04/22/09    CT-CHEST (THORAX) W/O    Impression  IMPRESSION:    1. 11 MM NONCALCIFIED SPICULATED SOFT TISSUE MASS ANTERIORLY IN THE RIGHT  LUNG APEX (IMAGE 30 ).  THIS IS SUSPICIOUS FOR BRONCHOGENIC  CARCINOMA.  GIVEN ITS SIZE, IT WOULD LIKELY BE SENSITIVE TO FDG IMAGING  IF METABOLICALLY ACTIVE, AS A CANCER WOULD BE EXPECTED TO BE.    2. SMALL NONSPECIFIC MEDIASTINAL ADENOPATHY.    3. EVIDENCE OF PRIOR GRANULOMATOUS EXPOSURE.    Results for orders placed during the hospital encounter of 04/30/09    MR-KNEE-W/O    Impression  IMPRESSION:    1. MACERATED-TYPE TEAR SEEN INVOLVING THE POSTERIOR HORN AND BODY OF THE  LATERAL MENISCUS UNLESS THE PATIENT HAS HAD PRIOR PARTIAL MENISCECTOMY.    2. CHRONIC MILD SPRAIN TO THE ANTERIOR AND POSTERIOR CRUCIATE LIGAMENTS.    3. TRICOMPARTMENT DEGENERATIVE CHANGE WHICH APPEARS TO INVOLVE THE  PATELLOFEMORAL AND THE LATERAL FEMOROTIBIAL ARTICULATIONS TO GREATEST  DEGREE.  SOME DEGENERATIVE CHANGE IS ALSO SEEN INVOLVING THE PROXIMAL  TIBIOFIBULAR ARTICULATION.    4. MODERATE-SIZED JOINT EFFUSION WITH EXTENSIVE SYNOVITIS.    5. LIKELY INTRAARTICULAR OSSIFIC LOOSE BODY SEEN ADJACENT TO THE PROXIMAL  TIBIA POSTERIORLY.    6. MULTIPLE LOW SIGNAL AREAS SEEN WITHIN A MODERATE-SIZED BAKER CYST  WHICH MAY REPRESENT EITHER SYNOVITIS OR EXTRUDED INTRAARTICULAR LOOSE  BODIES.      All relevant laboratory and imaging results reported on this note were reviewed and interpreted by me.         Assessment & Plan     Angela Winchester is a 78 y.o. female with history as noted above whose presentation merits the following clinical impressions and recommendations:    1.  Seropositive rheumatoid arthritis (HCC)  Clinically and serologically low disease activity that appears to be well-controlled on the current regimen of Xeljanz and sulfasalazine, so no need for modification of treatment.   - CRP QUANTITIVE (NON-CARDIAC); Future  - Sed Rate; Future  - Continue Xeljanz 11 mg SR daily  - Continue sulfasalazine 1000 mg twice daily    2. Primary osteoarthritis involving multiple joints (hands, shoulders, knees, and feet)  Considered a significant component of her overall joint pain burden which can be managed supportively.  - Voltaren 1% gel and Tylenol Arthritis with or without oral NSAIDs as needed  - Consider intra-articular steroid injection if that becomes necessary    3. Multiple atypical skin moles  Raises concern for cutaneous neoplasm so need skin biopsy.  - Routine follow-up with dermatology recommended    4. Immunosuppressed status (HCC)  Presently with no history, physical, or laboratory evidence to suggest significant adverse drug effects or opportunistic infections, but need routine monitoring per guidelines.  - CBC WITH DIFFERENTIAL; Future  - Comp Metabolic Panel; Future  - Need to ascertain age-appropriate vaccines in addition to the ones listed above under immunizations      The above assessment and plan were discussed with the patient who acknowledged understanding of the plan.    FOLLOW-UP: Return in about 3 months (around 1/23/2024) for Short.         Thank you for the opportunity to participate in the care of Angela Winchester.    Basil Ren MD, MS, FACR  Rheumatologist, [facility] ? [facility]   of Clinical Medicine, Department of Internal Medicine  [facility] ? [facility]

## 2023-11-14 NOTE — PATIENT INSTRUCTIONS
AFTER VISIT INSTRUCTIONS    Below are important information to help you navigate your healthcare needs and help us serve you safely and effectively:  If laboratory tests and/or imaging studies were ordered, remember to go get them done as instructed.  If new prescriptions and/or refills were sent, remember to go pick them up from your local pharmacy, or call the specialty pharmacy to request shipment.  Always take your prescription medications exactly as prescribed unless instructed otherwise.  Note that antirheumatic drugs and steroids are immunosuppressive which means they increase your risk of infections and have multiple potential adverse effects on various organ systems in your body, though most of them are uncommon.  It is important that you are up-to-date on age-appropriate immunizations, particularly shingles and bacterial/viral pneumonia vaccines, which you can request from me or your primary care provider.  Be sure to read the drug package inserts to learn about the potential side effects of your medications before you start taking them.  If you experience any significant drug side effects, stop taking the medication and notify me promptly, and depending on the severity of the side effects, consider going to an urgent care or emergency department for immediate attention.  If there are significant findings on your lab tests and imaging studies that warrant further action, I will notify you with explanations via [employer] or phone call, otherwise you can view them on [employer] and let me know if you have any questions.  Note that [employer] messages are typically read during office hours and may take 1-7 business days before a response depending on the urgency of the situation and how busy my clinic schedule is.  In general, [employer] messaging is for non-urgent matters that do not require immediate attention, so for urgent matters that cannot wait, you are advised to go to an urgent care.  Lastly, you are granted  MyChart access to my documentation of your visit and are encouraged to read my note which details my assessment and plan for your condition.   Scabies

## 2023-11-28 ENCOUNTER — APPOINTMENT (RX ONLY)
Dept: URBAN - METROPOLITAN AREA CLINIC 6 | Facility: CLINIC | Age: 78
Setting detail: DERMATOLOGY
End: 2023-11-28

## 2023-11-28 DIAGNOSIS — L82.1 OTHER SEBORRHEIC KERATOSIS: ICD-10-CM

## 2023-11-28 DIAGNOSIS — L81.4 OTHER MELANIN HYPERPIGMENTATION: ICD-10-CM

## 2023-11-28 PROBLEM — D48.5 NEOPLASM OF UNCERTAIN BEHAVIOR OF SKIN: Status: ACTIVE | Noted: 2023-11-28

## 2023-11-28 PROCEDURE — ? BIOPSY BY SHAVE METHOD

## 2023-11-28 PROCEDURE — 11102 TANGNTL BX SKIN SINGLE LES: CPT

## 2023-11-28 PROCEDURE — ? COUNSELING

## 2023-11-28 PROCEDURE — 99212 OFFICE O/P EST SF 10 MIN: CPT | Mod: 25

## 2023-11-28 ASSESSMENT — LOCATION ZONE DERM
LOCATION ZONE: NOSE
LOCATION ZONE: FACE

## 2023-11-28 ASSESSMENT — LOCATION DETAILED DESCRIPTION DERM
LOCATION DETAILED: RIGHT CENTRAL MALAR CHEEK
LOCATION DETAILED: RIGHT NASAL SIDEWALL

## 2023-11-28 ASSESSMENT — LOCATION SIMPLE DESCRIPTION DERM
LOCATION SIMPLE: RIGHT NOSE
LOCATION SIMPLE: RIGHT CHEEK

## 2023-11-29 ENCOUNTER — PATIENT MESSAGE (OUTPATIENT)
Dept: HEALTH INFORMATION MANAGEMENT | Facility: OTHER | Age: 78
End: 2023-11-29

## 2024-01-04 ENCOUNTER — OFFICE VISIT (OUTPATIENT)
Dept: URGENT CARE | Facility: PHYSICIAN GROUP | Age: 79
End: 2024-01-04
Payer: MEDICARE

## 2024-01-04 VITALS
HEART RATE: 72 BPM | BODY MASS INDEX: 25.27 KG/M2 | OXYGEN SATURATION: 93 % | HEIGHT: 64 IN | TEMPERATURE: 98.3 F | DIASTOLIC BLOOD PRESSURE: 82 MMHG | SYSTOLIC BLOOD PRESSURE: 134 MMHG | WEIGHT: 148 LBS | RESPIRATION RATE: 16 BRPM

## 2024-01-04 DIAGNOSIS — R05.1 ACUTE COUGH: ICD-10-CM

## 2024-01-04 DIAGNOSIS — J32.9 RHINOSINUSITIS: ICD-10-CM

## 2024-01-04 PROCEDURE — 3079F DIAST BP 80-89 MM HG: CPT | Performed by: FAMILY MEDICINE

## 2024-01-04 PROCEDURE — 99213 OFFICE O/P EST LOW 20 MIN: CPT | Performed by: FAMILY MEDICINE

## 2024-01-04 PROCEDURE — 3075F SYST BP GE 130 - 139MM HG: CPT | Performed by: FAMILY MEDICINE

## 2024-01-04 RX ORDER — BENZONATATE 100 MG/1
100 CAPSULE ORAL 3 TIMES DAILY PRN
Qty: 30 CAPSULE | Refills: 0 | Status: SHIPPED | OUTPATIENT
Start: 2024-01-04

## 2024-01-04 RX ORDER — DOXYCYCLINE HYCLATE 100 MG
100 TABLET ORAL 2 TIMES DAILY
Qty: 14 TABLET | Refills: 0 | Status: SHIPPED | OUTPATIENT
Start: 2024-01-04 | End: 2024-01-11

## 2024-01-04 ASSESSMENT — ENCOUNTER SYMPTOMS
EYE REDNESS: 0
MYALGIAS: 0
NAUSEA: 0
WEIGHT LOSS: 0
VOMITING: 0
EYE DISCHARGE: 0

## 2024-01-04 ASSESSMENT — FIBROSIS 4 INDEX: FIB4 SCORE: 0.97

## 2024-01-04 NOTE — PROGRESS NOTES
"Subjective     Angela Winchester is a 78 y.o. female who presents with Sinusitis (X 6 days)            6 days sinus pressure and drainage. No fever. Cough due to drainage. PMH sinusitis. No sinus surgery. No SOB/wheeze. No known exposures. No other aggravating or alleviating factors.          Review of Systems   Constitutional:  Negative for malaise/fatigue and weight loss.   Eyes:  Negative for discharge and redness.   Gastrointestinal:  Negative for nausea and vomiting.   Musculoskeletal:  Negative for joint pain and myalgias.   Skin:  Negative for itching and rash.              Objective     /82 (BP Location: Left arm, Patient Position: Sitting, BP Cuff Size: Adult)   Pulse 72   Temp 36.8 °C (98.3 °F) (Temporal)   Resp 16   Ht 1.626 m (5' 4\")   Wt 67.1 kg (148 lb)   SpO2 93%   BMI 25.40 kg/m²      Physical Exam  Constitutional:       General: She is not in acute distress.     Appearance: She is well-developed.   HENT:      Head: Normocephalic and atraumatic.      Right Ear: Tympanic membrane normal.      Left Ear: Tympanic membrane normal.      Nose: Congestion present.      Mouth/Throat:      Mouth: Mucous membranes are moist.      Pharynx: No posterior oropharyngeal erythema.      Comments: PND  Eyes:      Conjunctiva/sclera: Conjunctivae normal.   Cardiovascular:      Rate and Rhythm: Normal rate and regular rhythm.      Heart sounds: Normal heart sounds. No murmur heard.  Pulmonary:      Effort: Pulmonary effort is normal.      Breath sounds: Normal breath sounds. No wheezing.   Skin:     General: Skin is warm and dry.      Findings: No rash.   Neurological:      Mental Status: She is alert.                             Assessment & Plan        1. Rhinosinusitis  doxycycline (VIBRAMYCIN) 100 MG Tab      2. Acute cough  benzonatate (TESSALON PERLES) 100 MG Cap           Differential diagnosis, natural history, supportive care, and indications for immediate follow-up were discussed.     Nasal " saline, nasal corticosteroid    Contingent antibiotic prescription given to patient to fill upon meeting criteria of guidelines discussed.

## 2024-01-22 DIAGNOSIS — M05.9 SEROPOSITIVE RHEUMATOID ARTHRITIS (HCC): ICD-10-CM

## 2024-01-23 ENCOUNTER — APPOINTMENT (OUTPATIENT)
Dept: RHEUMATOLOGY | Facility: MEDICAL CENTER | Age: 79
End: 2024-01-23
Attending: STUDENT IN AN ORGANIZED HEALTH CARE EDUCATION/TRAINING PROGRAM
Payer: MEDICARE

## 2024-01-23 ENCOUNTER — HOSPITAL ENCOUNTER (OUTPATIENT)
Dept: LAB | Facility: MEDICAL CENTER | Age: 79
End: 2024-01-23
Attending: STUDENT IN AN ORGANIZED HEALTH CARE EDUCATION/TRAINING PROGRAM
Payer: MEDICARE

## 2024-01-23 DIAGNOSIS — D84.9 IMMUNOSUPPRESSED STATUS (HCC): ICD-10-CM

## 2024-01-23 DIAGNOSIS — M05.9 SEROPOSITIVE RHEUMATOID ARTHRITIS (HCC): ICD-10-CM

## 2024-01-23 LAB
ALBUMIN SERPL BCP-MCNC: 4 G/DL (ref 3.2–4.9)
ALBUMIN/GLOB SERPL: 1.2 G/DL
ALP SERPL-CCNC: 64 U/L (ref 30–99)
ALT SERPL-CCNC: 15 U/L (ref 2–50)
ANION GAP SERPL CALC-SCNC: 16 MMOL/L (ref 7–16)
AST SERPL-CCNC: 21 U/L (ref 12–45)
BASOPHILS # BLD AUTO: 0.8 % (ref 0–1.8)
BASOPHILS # BLD: 0.07 K/UL (ref 0–0.12)
BILIRUB SERPL-MCNC: 0.4 MG/DL (ref 0.1–1.5)
BUN SERPL-MCNC: 8 MG/DL (ref 8–22)
CALCIUM ALBUM COR SERPL-MCNC: 9.2 MG/DL (ref 8.5–10.5)
CALCIUM SERPL-MCNC: 9.2 MG/DL (ref 8.5–10.5)
CHLORIDE SERPL-SCNC: 99 MMOL/L (ref 96–112)
CO2 SERPL-SCNC: 22 MMOL/L (ref 20–33)
CREAT SERPL-MCNC: 0.73 MG/DL (ref 0.5–1.4)
CRP SERPL HS-MCNC: <0.3 MG/DL (ref 0–0.75)
EOSINOPHIL # BLD AUTO: 0.1 K/UL (ref 0–0.51)
EOSINOPHIL NFR BLD: 1.1 % (ref 0–6.9)
ERYTHROCYTE [DISTWIDTH] IN BLOOD BY AUTOMATED COUNT: 56.9 FL (ref 35.9–50)
GFR SERPLBLD CREATININE-BSD FMLA CKD-EPI: 85 ML/MIN/1.73 M 2
GLOBULIN SER CALC-MCNC: 3.4 G/DL (ref 1.9–3.5)
GLUCOSE SERPL-MCNC: 83 MG/DL (ref 65–99)
HCT VFR BLD AUTO: 41.8 % (ref 37–47)
HGB BLD-MCNC: 13.5 G/DL (ref 12–16)
IMM GRANULOCYTES # BLD AUTO: 0.05 K/UL (ref 0–0.11)
IMM GRANULOCYTES NFR BLD AUTO: 0.6 % (ref 0–0.9)
LYMPHOCYTES # BLD AUTO: 1.87 K/UL (ref 1–4.8)
LYMPHOCYTES NFR BLD: 21.5 % (ref 22–41)
MCH RBC QN AUTO: 32.9 PG (ref 27–33)
MCHC RBC AUTO-ENTMCNC: 32.3 G/DL (ref 32.2–35.5)
MCV RBC AUTO: 102 FL (ref 81.4–97.8)
MONOCYTES # BLD AUTO: 1.07 K/UL (ref 0–0.85)
MONOCYTES NFR BLD AUTO: 12.3 % (ref 0–13.4)
NEUTROPHILS # BLD AUTO: 5.55 K/UL (ref 1.82–7.42)
NEUTROPHILS NFR BLD: 63.7 % (ref 44–72)
NRBC # BLD AUTO: 0 K/UL
NRBC BLD-RTO: 0 /100 WBC (ref 0–0.2)
PLATELET # BLD AUTO: 266 K/UL (ref 164–446)
PMV BLD AUTO: 9.9 FL (ref 9–12.9)
POTASSIUM SERPL-SCNC: 4.1 MMOL/L (ref 3.6–5.5)
PROT SERPL-MCNC: 7.4 G/DL (ref 6–8.2)
RBC # BLD AUTO: 4.1 M/UL (ref 4.2–5.4)
SODIUM SERPL-SCNC: 137 MMOL/L (ref 135–145)
WBC # BLD AUTO: 8.7 K/UL (ref 4.8–10.8)

## 2024-01-23 PROCEDURE — 80053 COMPREHEN METABOLIC PANEL: CPT

## 2024-01-23 PROCEDURE — 85025 COMPLETE CBC W/AUTO DIFF WBC: CPT

## 2024-01-23 PROCEDURE — 36415 COLL VENOUS BLD VENIPUNCTURE: CPT

## 2024-01-23 PROCEDURE — 85652 RBC SED RATE AUTOMATED: CPT

## 2024-01-23 PROCEDURE — 86140 C-REACTIVE PROTEIN: CPT

## 2024-01-24 LAB — ERYTHROCYTE [SEDIMENTATION RATE] IN BLOOD BY WESTERGREN METHOD: 28 MM/HOUR (ref 0–25)

## 2024-01-24 RX ORDER — SULFASALAZINE 500 MG/1
1000 TABLET, DELAYED RELEASE ORAL 2 TIMES DAILY WITH MEALS
Qty: 360 TABLET | Refills: 3 | Status: SHIPPED | OUTPATIENT
Start: 2024-01-24

## 2024-01-31 ENCOUNTER — APPOINTMENT (OUTPATIENT)
Dept: RHEUMATOLOGY | Facility: MEDICAL CENTER | Age: 79
End: 2024-01-31
Attending: STUDENT IN AN ORGANIZED HEALTH CARE EDUCATION/TRAINING PROGRAM
Payer: MEDICARE

## 2024-02-01 ENCOUNTER — OFFICE VISIT (OUTPATIENT)
Dept: RHEUMATOLOGY | Facility: MEDICAL CENTER | Age: 79
End: 2024-02-01
Attending: STUDENT IN AN ORGANIZED HEALTH CARE EDUCATION/TRAINING PROGRAM
Payer: MEDICARE

## 2024-02-01 VITALS
DIASTOLIC BLOOD PRESSURE: 72 MMHG | OXYGEN SATURATION: 97 % | BODY MASS INDEX: 24.75 KG/M2 | WEIGHT: 145 LBS | TEMPERATURE: 97.9 F | SYSTOLIC BLOOD PRESSURE: 122 MMHG | HEART RATE: 76 BPM | HEIGHT: 64 IN

## 2024-02-01 DIAGNOSIS — M05.9 SEROPOSITIVE RHEUMATOID ARTHRITIS (HCC): ICD-10-CM

## 2024-02-01 DIAGNOSIS — M15.9 OSTEOARTHRITIS INVOLVING MULTIPLE JOINTS ON BOTH SIDES OF BODY: ICD-10-CM

## 2024-02-01 DIAGNOSIS — D22.9 MULTIPLE ATYPICAL SKIN MOLES: ICD-10-CM

## 2024-02-01 DIAGNOSIS — D75.89 MACROCYTOSIS WITHOUT ANEMIA: ICD-10-CM

## 2024-02-01 DIAGNOSIS — D84.9 IMMUNOSUPPRESSED STATUS (HCC): ICD-10-CM

## 2024-02-01 PROCEDURE — 3078F DIAST BP <80 MM HG: CPT | Performed by: STUDENT IN AN ORGANIZED HEALTH CARE EDUCATION/TRAINING PROGRAM

## 2024-02-01 PROCEDURE — 99214 OFFICE O/P EST MOD 30 MIN: CPT | Performed by: STUDENT IN AN ORGANIZED HEALTH CARE EDUCATION/TRAINING PROGRAM

## 2024-02-01 PROCEDURE — 99212 OFFICE O/P EST SF 10 MIN: CPT | Performed by: STUDENT IN AN ORGANIZED HEALTH CARE EDUCATION/TRAINING PROGRAM

## 2024-02-01 PROCEDURE — 3074F SYST BP LT 130 MM HG: CPT | Performed by: STUDENT IN AN ORGANIZED HEALTH CARE EDUCATION/TRAINING PROGRAM

## 2024-02-01 RX ORDER — FOLIC ACID 1 MG/1
2 TABLET ORAL DAILY
Qty: 180 TABLET | Refills: 0 | Status: SHIPPED | OUTPATIENT
Start: 2024-02-01 | End: 2024-05-01

## 2024-02-01 ASSESSMENT — FIBROSIS 4 INDEX: FIB4 SCORE: 1.55

## 2024-02-01 ASSESSMENT — PATIENT HEALTH QUESTIONNAIRE - PHQ9: CLINICAL INTERPRETATION OF PHQ2 SCORE: 0

## 2024-02-01 NOTE — PATIENT INSTRUCTIONS
AFTER VISIT INSTRUCTIONS    Below are important information to help you navigate your healthcare needs and help us serve you safely and effectively:  If laboratory tests and/or imaging studies were ordered, remember to go get them done as instructed.  If new prescriptions and/or refills were sent, remember to go pick them up from your local pharmacy, or call the specialty pharmacy to request shipment.  Always take your prescription medications exactly as prescribed unless instructed otherwise.  Note that antirheumatic drugs and steroids are immunosuppressive which means they increase your risk of infections and have multiple potential adverse effects on various organ systems in your body, though most of them are uncommon.  It is important that you are up-to-date on age-appropriate immunizations, particularly shingles and bacterial/viral pneumonia vaccines, which you can request from me or your primary care provider.  Be sure to read the drug package inserts to learn about the potential side effects of your medications before you start taking them.  If you experience any significant drug side effects, stop taking the medication and notify me promptly, and depending on the severity of the side effects, consider going to an urgent care or emergency department for immediate attention.  If there are significant findings on your lab tests and imaging studies that warrant further action, I will notify you with explanations via [facility] or phone call, otherwise you can view them on [facility] and let me know if you have any questions.  Note that [facility] messages are typically read during office hours and may take 1-7 business days before a response depending on the urgency of the situation and how busy my clinic schedule is.  In general, [facility] messaging is for non-urgent matters that do not require immediate attention, so for urgent matters that cannot wait, you are advised to go to an urgent care.  Lastly, you are granted  [facility] access to my documentation of your visit and are encouraged to read my note which details my assessment and plan for your condition.  To learn more about your condition and rheumatic diseases evaluated and treated by rheumatologists, as well as gain access to many helpful resources about these diseases, visit our website at [email or web address].

## 2024-02-01 NOTE — PROGRESS NOTES
[facility] RHEUMATOLOGY  [address], [address], [place] [address]  Phone: [phone] ? Fax: [phone]  [facility]/Health-Services/Rheumatology    FOLLOW-UP VISIT NOTE      DATE OF SERVICE: 02/01/2024         Subjective     PRIMARY CARE PRACTITIONER:  YA Silvestre  [address] [place] [address]    PATIENT IDENTIFICATION:  Angela Winchester  [address]  [place] [address]    YOB: 1945    MEDICAL RECORD NUMBER: [id number]          CHIEF COMPLAINT:   Chief Complaint   Patient presents with    Follow-Up     Seropositive rheumatoid arthritis (HCC)       RHEUMATOLOGIC HISTORY:  Angela Winchester is a 78 y.o. female with pertinent history notable for seropositive rheumatoid arthritis diagnosed in 2011 (symptomatic from 2009), osteoarthritis of multiple joints (hands, shoulders, knees, feet) s/p bilateral knee replacements, and DJD of cervical/thoracic spine. Previously under the care of a local rheumatologist who has retired (Dr. Conor Hull), she initially presented on 8/18/22 to establish care for continued management of her condition. Noted oral surgery in 5/2022 for which her DMARDs were held for almost a month prior, so had a flare for which Dr. Hull prescribed a 10-day course of prednisone 20mg taper which was very helpful. Reported residual but tolerable joint/muscle pain in her hands, wrists, left shoulder, neck, lower back, knees, ankles and feet. These were associated with up to 1 hour of morning stiffness that improved with activity but her joint pain tended to worsen with much activity over the course of the day. Noted that she had tried Voltaren gel and had steroid injections to her left shoulder and both knees in the past which were not very helpful. Noted that orthopedic surgery recommended left shoulder replacement but she was not inclined to undergoing surgery.     Pertinent treatments: Prednisone 20 mg tapers (on/off, most recent in 7/2023,  effective), Remicade (discontinued after 3 infusions due to ineffectiveness), Humira (effective for 8 years, then became ineffective), methotrexate (stopped in 8/2022 due to hair loss and macrocytic anemia), Xeljanz 11 mg SR daily (2020-present, effective), sulfasalazine 500>1000 mg twice daily (started 8/2022, dose increased 7/2023-present, effective).     Pertinent positive labs: Positive anti- and  (in 7/2019); elevated ESR 28<22 with normal CRP 0.3<0.6, and elevated  with normal Hgb 13.5 (in 1/2024<10/2023).    Pertinent negative labs: Negative HCV (in 11/2018) and HBV (in 10/2019), QTB (in 7/2019), vitamin D (in 10/2023), eGFR, creatinine, and LFTs (in 1/2024).    Pertinent XR imaging:   Hands and feet (in 6/2019) with moderate osteoarthritis but no evidence of inflammatory arthropathy.   Shoulders (in 6/2019) with severe osteoarthritis of left GH joint, and mild bilateral osteoarthritis of AC joint.      INTERVAL HISTORY:  Reports interval history as noted on the questionnaire below or scanned under media tab.  Notably waxing/waning joint pain/stiffness in hands, feet, knees, shoulders, neck/upper and lower back.  Fell two weeks ago with more joint pain in her right knee and lower back (with spasms).  Had been recovering from a sinus infection prior to her fall incident, but now doing better.    REVIEW OF SYSTEMS:  Except as noted in the history above, relevant review of systems with emphasis on autoimmune rheumatic diseases was otherwise negative.      ACTIVE PROBLEM LIST:  Patient Active Problem List    Diagnosis Date Noted    Immunosuppressed status (HCC) 04/20/2023    Multiple atypical skin moles 04/20/2023    Long-term use of immunosuppressant medication 09/15/2022    Seropositive rheumatoid arthritis (HCC) 09/15/2022    Osteoarthritis involving multiple joints on both sides of body 09/15/2022       PAST MEDICAL HISTORY:  Past Medical History:   Diagnosis Date    Arthritis     Breath  shortness     with exertion    Bronchitis 01/2018    Cancer (HCC) 2009    LUNG CANCER= 8/13/2018 pt states she had lung resection and it was fungal infection, no cancer; Skin    Cataract     Emphysema of lung ([facility])     High cholesterol     Hypertension     Pain     knee/hand    Pneumonia 02/2018    RA (rheumatoid arthritis) ([facility])     Sleep apnea     CPAP with 2.5 liters oxygen    Snoring        PAST SURGICAL HISTORY:  Past Surgical History:   Procedure Laterality Date    KNEE ARTHROPLASTY TOTAL Right 8/27/2018    Procedure: KNEE ARTHROPLASTY TOTAL;  Surgeon: Fabian Kennedy M.D.;  Location: SURGERY [facility];  Service: Orthopedics    TIBIAL OSTEOTOMY Right 8/27/2018    Procedure: TIBIAL OSTEOTOMY - POSS TUBERCLE;  Surgeon: Fabian Kennedy M.D.;  Location: SURGERY [facility];  Service: Orthopedics    CATARACT PHACO WITH IOL  7/1/2013    Performed by Tyron Quiles M.D. at SURGERY [facility]    CATARACT PHACO WITH IOL  6/17/2013    Performed by Tyron Quiles M.D. at SURGERY [facility]    CARPAL TUNNEL ENDOSCOPIC  2/5/2010    Performed by EREN KOVACS at SURGERY SAME DAY [facility]    TUMOR EXCISION WITH BIOPSY Left 2010    left wrist for skin cancer, done in the office    THORACOSCOPY  6/8/2009    Performed by GANSER, JOHN H at SURGERY [facility]       MEDICATIONS:  Current Outpatient Medications   Medication Sig    folic acid (FOLVITE) 1 MG Tab Take 2 Tablets by mouth every day for 90 days.    sulfaSALAzine (AZULFIDINE EN-TAB) 500 MG EC tablet TAKE 2 TABLETS BY MOUTH TWICE DAILY WITH MEALS    benzonatate (TESSALON PERLES) 100 MG Cap Take 1 Capsule by mouth 3 times a day as needed for Cough.    XELJANZ XR 11 MG TABLET SR 24 HR TAKE 1 TABLET DAILY    predniSONE (DELTASONE) 5 MG Tab Take 20 mg daily for 7 days, then decrease by 2.5 mg every 7 days until finished. Take with food.    amLODIPine (NORVASC) 10 MG Tab amlodipine 10 mg tablet    cyclobenzaprine (FLEXERIL) 10 mg  Tab cyclobenzaprine 10 mg tablet    fluticasone (FLONASE) 50 MCG/ACT nasal spray fluticasone propionate 50 mcg/actuation nasal spray,suspension    potassium chloride ER (KLOR-CON) 10 MEQ tablet Take 10 mEq by mouth every day.    furosemide (LASIX) 20 MG Tab TAKE 1/2 TO 1 TABLET BY MOUTH DAILY    metFORMIN (GLUCOPHAGE) 500 MG Tab Take 500 mg by mouth 2 times a day.    TRELEGY ELLIPTA 200-62.5-25 MCG/INH AEROSOL POWDER, BREATH ACTIVATED     albuterol 108 (90 Base) MCG/ACT Aero Soln inhalation aerosol Inhale 1-2 Puffs by mouth every four hours as needed for Shortness of Breath.    Cholecalciferol (VITAMIN D3) 5000 units Cap Take 1 Cap by mouth every day.    losartan (COZAAR) 100 MG Tab Take 100 mg by mouth every day.    metoprolol SR (TOPROL XL) 25 MG TABLET SR 24 HR Take 25 mg by mouth every day.    ALPRAZolam (XANAX) 0.5 MG Tab Take 0.5 mg by mouth at bedtime as needed for Sleep.    atorvastatin (LIPITOR) 20 MG Tab Take 20 mg by mouth every evening.    hydrocodone-acetaminophen (MAXIDONE)  MG per tablet Take 1-2 Tabs by mouth every 6 hours as needed for Mild Pain.       ALLERGIES:   No Known Allergies    IMMUNIZATIONS:  Immunization History   Administered Date(s) Administered    Deepak SARS-CoV-2 Vaccine 03/21/2021, 11/03/2021       SOCIAL HISTORY:   Social History     Socioeconomic History    Marital status:    Tobacco Use    Smoking status: Every Day     Current packs/day: 2.00     Average packs/day: 2.0 packs/day for 42.0 years (84.0 ttl pk-yrs)     Types: Cigarettes    Smokeless tobacco: Never    Tobacco comments:     2-3 ppd; 8/13/18 currently 0.5ppd   Vaping Use    Vaping Use: Unknown   Substance and Sexual Activity    Alcohol use: No     Alcohol/week: 0.0 oz    Drug use: No       FAMILY HISTORY:  No family history on file.         Objective     Vital Signs: /72 (BP Location: Left arm, Patient Position: Sitting, BP Cuff Size: Adult)   Pulse 76   Temp 36.6 °C (97.9 °F) (Temporal)   Ht  "1.626 m (5' 4\")   Wt 65.8 kg (145 lb)   SpO2 97% Body mass index is 24.89 kg/m².    General: Appears well and comfortable  Eyes: No scleral or conjunctival lesions  ENT: No apparent oral or nasal lesions  Head/Neck: No apparent scalp or neck lesions  Cardiovascular: Regular rate and rhythm  Respiratory: Breathing quiet and unlabored  Gastrointestinal: No apparent organomegaly or abdominal masses  Integumentary: Multiple hyperpigmented moles with skin tags on face and upper chest  Musculoskeletal: Mild to moderate tenderness of right knee medial joint line; minimal tenderness of hands and left shoulder AC joint with restricted range of motion; no definite periarticular soft tissue swelling, warmth, erythema, or overt synovitis at any joints  Neurologic: No focal sensory or motor deficits  Psychiatric: Mood and affect appropriate      LABORATORY RESULTS REVIEWED AND INTERPRETED BY ME:  Lab Results   Component Value Date/Time    CREACTPROT <0.30 01/23/2024 11:27 AM    SEDRATEWES 28 (H) 01/23/2024 11:27 AM    URICACID 6.3 03/20/2012 02:49 PM     Lab Results   Component Value Date/Time    RHEUMFACTN 107 (H) 07/02/2019 01:13 PM    CCPANTIBODY 174 (H) 07/02/2019 01:13 PM     Lab Results   Component Value Date/Time    ANTINUCAB None Detected 07/02/2019 01:13 PM     Lab Results   Component Value Date/Time    TSHULTRASEN 5.640 (H) 10/11/2023 10:26 AM    FREET4 1.24 03/20/2012 02:49 PM     Lab Results   Component Value Date/Time    25HYDROXY 60 10/11/2023 10:26 AM     Lab Results   Component Value Date/Time    FERRITIN 175.8 03/31/2016 01:04 PM    IRON 64 03/31/2016 01:04 PM    TRANSFERRIN 216 03/31/2016 01:05 PM    FOLATE >20.00 09/08/2011 11:11 AM    PROTHROMBTM 10.6 06/04/2009 04:50 PM    INR 0.91 06/04/2009 04:50 PM     Lab Results   Component Value Date/Time    WBC 8.7 01/23/2024 11:27 AM    RBC 4.10 (L) 01/23/2024 11:27 AM    HEMOGLOBIN 13.5 01/23/2024 11:27 AM    HEMATOCRIT 41.8 01/23/2024 11:27 AM    .0 (H) " 01/23/2024 11:27 AM    MCH 32.9 01/23/2024 11:27 AM    MCHC 32.3 01/23/2024 11:27 AM    RDW 56.9 (H) 01/23/2024 11:27 AM    PLATELETCT 266 01/23/2024 11:27 AM    MPV 9.9 01/23/2024 11:27 AM    NEUTS 5.55 01/23/2024 11:27 AM    LYMPHOCYTES 21.50 (L) 01/23/2024 11:27 AM    MONOCYTES 12.30 01/23/2024 11:27 AM    EOSINOPHILS 1.10 01/23/2024 11:27 AM    BASOPHILS 0.80 01/23/2024 11:27 AM    ANISOCYTOSIS 1+ 07/02/2019 01:13 PM     Lab Results   Component Value Date/Time    ASTSGOT 21 01/23/2024 11:27 AM    ALTSGPT 15 01/23/2024 11:27 AM    ALKPHOSPHAT 64 01/23/2024 11:27 AM    TBILIRUBIN 0.4 01/23/2024 11:27 AM    TOTPROTEIN 7.4 01/23/2024 11:27 AM    ALBUMIN 4.0 01/23/2024 11:27 AM     Lab Results   Component Value Date/Time    SODIUM 137 01/23/2024 11:27 AM    POTASSIUM 4.1 01/23/2024 11:27 AM    CHLORIDE 99 01/23/2024 11:27 AM    CO2 22 01/23/2024 11:27 AM    GLUCOSE 83 01/23/2024 11:27 AM    BUN 8 01/23/2024 11:27 AM    CREATININE 0.73 01/23/2024 11:27 AM    CALCIUM 9.2 01/23/2024 11:27 AM    MAGNESIUM 1.9 03/31/2016 01:04 PM     Lab Results   Component Value Date/Time    MICROALBUR 2.2 10/11/2023 10:26 AM    MALBCRT 248 (H) 10/11/2023 10:26 AM     Lab Results   Component Value Date/Time    COLORURINE DK Yellow 08/13/2018 01:40 PM    SPECGRAVITY 1.021 08/13/2018 01:40 PM    PHURINE 6.0 08/13/2018 01:40 PM    GLUCOSEUR Negative 08/13/2018 01:40 PM    KETONES Negative 08/13/2018 01:40 PM    PROTEINURIN 30 (A) 08/13/2018 01:40 PM     Lab Results   Component Value Date/Time    HEPBSAG Negative 07/02/2019 01:13 PM    HEPBCORIGM Positive (A) 07/02/2019 01:13 PM    HEPBCORTOT Negative 10/01/2019 01:48 PM    HEPCAB Negative 07/02/2019 01:13 PM     Lab Results   Component Value Date/Time    CHOLSTRLTOT 165 10/11/2023 10:26 AM    LDL 82 10/11/2023 10:26 AM    HDL 49 10/11/2023 10:26 AM    TRIGLYCERIDE 169 (H) 10/11/2023 10:26 AM    HBA1C 6.5 (H) 11/06/2020 03:19 PM       RADIOLOGY RESULTS REVIEWED AND INTERPRETED BY  ME:  Results for orders placed during the hospital encounter of 11/20/06    DX-KNEES-AP BILATERAL STANDING    Impression  IMPRESSION:    MILD SYMMETRIC MEDIAL COMPARTMENT FEMOROTIBIAL OSTEOARTHROSIS.  NO  EVIDENCE OF ANY INFLAMMATORY  PROCESS.    Results for orders placed during the hospital encounter of 11/22/11    DX-FOOT-COMPLETE 3+    Impression  1. No no fracture or malalignment.    2. Chronic changes as described in the findings section.    Results for orders placed during the hospital encounter of 11/22/11    DX-ANKLE 3+ VIEWS    Impression  Negative right ankle series.  Diffuse soft tissue swelling.    Results for orders placed during the hospital encounter of 02/14/08    DX-KNEE COMPLETE 4+    Impression  IMPRESSION:    1. NO ACUTE FRACTURE IDENTIFIED.    2. SMALL KNEE JOINT EFFUSION.    3. PERIARTICULAR DEOSSIFICATION AND MILD OSTEOARTHROSIS.    Results for orders placed during the hospital encounter of 08/27/18    DX-KNEE 2- RIGHT    Impression  1. Status post right total knee replacement without evidence of hardware complication.    Results for orders placed during the hospital encounter of 11/20/06    DX-JOINT SURVEY-FEET SINGLE VIEW    Impression  IMPRESSION:    1. NO EVIDENCE OF INFLAMMATORY ARTHROPATHY.    2. BLAND DEGENERATIVE ARTHROSIS POLYARTICULAR.    Results for orders placed in visit on 08/01/17    DX-HAND 3+ RIGHT    Impression  Osteoarthritis affecting the interphalangeal joints with joint space narrowing, subchondral sclerosis and osteophyte formation.    No fracture or acute bony abnormality.    Results for orders placed during the hospital encounter of 06/03/19    DX-HAND 2- LEFT    Impression  1.  Moderate osteoarthritis.  2.  No evidence for inflammatory arthropathy.  3.  Evidence of old trauma to the distal radial metaphysis.    Results for orders placed during the hospital encounter of 11/20/06    DX-JOINT SURVEY-HANDS SINGLE VIEW    Impression  IMPRESSION:    BLAND DEGENERATIVE  OSTEOARTHROSIS OF THE INTERPHALANGEAL JOINTS AND THUMB  BASE.  NO EVIDENCE OF INFLAMMATORY ARTHRITIS.    Results for orders placed during the hospital encounter of 06/03/19    DX-SHOULDER 2+ LEFT    Impression  1.  Severe degenerative change of LEFT shoulder with probable associated joint body.  2.  No fracture or dislocation.    Results for orders placed during the hospital encounter of 04/26/10    DS-BONE DENSITY STUDY (DEXA)    Impression  IMPRESSION:    ACCORDING TO THE WORLD HEALTH ORGANIZATION CLASSIFICATION, BONE MINERAL  DENSITY OF THIS PATIENT IS OSTEOPENIC.    Results for orders placed during the hospital encounter of 04/22/09    CT-CHEST (THORAX) W/O    Impression  IMPRESSION:    1. 11 MM NONCALCIFIED SPICULATED SOFT TISSUE MASS ANTERIORLY IN THE RIGHT  LUNG APEX (IMAGE 30 ).  THIS IS SUSPICIOUS FOR BRONCHOGENIC  CARCINOMA.  GIVEN ITS SIZE, IT WOULD LIKELY BE SENSITIVE TO FDG IMAGING  IF METABOLICALLY ACTIVE, AS A CANCER WOULD BE EXPECTED TO BE.    2. SMALL NONSPECIFIC MEDIASTINAL ADENOPATHY.    3. EVIDENCE OF PRIOR GRANULOMATOUS EXPOSURE.    Results for orders placed during the hospital encounter of 04/30/09    MR-KNEE-W/O    Impression  IMPRESSION:    1. MACERATED-TYPE TEAR SEEN INVOLVING THE POSTERIOR HORN AND BODY OF THE  LATERAL MENISCUS UNLESS THE PATIENT HAS HAD PRIOR PARTIAL MENISCECTOMY.    2. CHRONIC MILD SPRAIN TO THE ANTERIOR AND POSTERIOR CRUCIATE LIGAMENTS.    3. TRICOMPARTMENT DEGENERATIVE CHANGE WHICH APPEARS TO INVOLVE THE  PATELLOFEMORAL AND THE LATERAL FEMOROTIBIAL ARTICULATIONS TO GREATEST  DEGREE.  SOME DEGENERATIVE CHANGE IS ALSO SEEN INVOLVING THE PROXIMAL  TIBIOFIBULAR ARTICULATION.    4. MODERATE-SIZED JOINT EFFUSION WITH EXTENSIVE SYNOVITIS.    5. LIKELY INTRAARTICULAR OSSIFIC LOOSE BODY SEEN ADJACENT TO THE PROXIMAL  TIBIA POSTERIORLY.    6. MULTIPLE LOW SIGNAL AREAS SEEN WITHIN A MODERATE-SIZED BAKER CYST  WHICH MAY REPRESENT EITHER SYNOVITIS OR EXTRUDED INTRAARTICULAR  LOOSE  BODIES.      All relevant laboratory and imaging results reported on this note were reviewed and interpreted by me.         Assessment & Plan     Angela Winchester is a 78 y.o. female with history as noted above whose presentation merits the following clinical impressions and recommendations:    1. Seropositive rheumatoid arthritis (HCC)  Clinically low disease activity that appears to remain responsive to the current regimen of Xeljanz and sulfasalazine. No need for modification of treatment as most of her reported pain is biomechanical arthralgia secondary to osteoarthritis. Furthermore, her recent fall and sinus infection are considered perturbations that likely contributed to her feeling worse which is getting better.  - CRP QUANTITIVE (NON-CARDIAC); Future  - Sed Rate; Future  - Continue Xeljanz 11 mg SR daily  - Continue sulfasalazine 1000 mg twice daily  - Consider switching to Rinvoq depending on her clinical trajectory    2. Osteoarthritis involving multiple joints on both sides of body  Considered a significant component of her overall joint pain burden which can be managed supportively.  - Voltaren 1% gel and Tylenol Arthritis with or without oral NSAIDs as needed  - Consider intra-articular steroid injection if that becomes necessary    3. Multiple atypical skin moles  Evaluated by dermatology and underwent skin biopsy which reportedly showed no evidence of malignancy.  - Routine follow-up with dermatology recommended    4. Macrocytosis without anemia  Potentially side effect of sulfasalazine or possible nutritional deficiency, so reasonable to supplement as noted below.  - CBC WITH DIFFERENTIAL; Future  - VIT B12,  FOLIC ACID  - folic acid (FOLVITE) 1 MG Tab; Take 2 Tablets by mouth every day for 90 days.  Dispense: 180 Tablet; Refill: 0    5. Immunosuppressed status (HCC)  Presently with no history, physical, or laboratory evidence to suggest significant adverse drug effects or opportunistic  infections, but need routine monitoring per guidelines.  - CBC WITH DIFFERENTIAL; Future  - Comp Metabolic Panel; Future  - Need to ascertain age-appropriate vaccines in addition to the ones listed above under immunizations      The above assessment and plan were discussed with the patient who acknowledged understanding of the plan.    FOLLOW-UP: Return in about 3 months (around 5/1/2024) for Short.         Thank you for the opportunity to participate in the care of Angela Winchester.    Basil Ren MD, MS, FACR  Rheumatologist, [facility] ? [facility]   of Clinical Medicine, Department of Internal Medicine  [facility] ? [facility] [employer]

## 2024-02-06 ENCOUNTER — APPOINTMENT (RX ONLY)
Dept: URBAN - METROPOLITAN AREA CLINIC 6 | Facility: CLINIC | Age: 79
Setting detail: DERMATOLOGY
End: 2024-02-06

## 2024-02-06 DIAGNOSIS — L82.1 OTHER SEBORRHEIC KERATOSIS: ICD-10-CM

## 2024-02-06 PROCEDURE — 99212 OFFICE O/P EST SF 10 MIN: CPT

## 2024-02-06 PROCEDURE — ? COUNSELING

## 2024-02-06 ASSESSMENT — LOCATION DETAILED DESCRIPTION DERM
LOCATION DETAILED: UPPER STERNUM
LOCATION DETAILED: RIGHT INFERIOR MEDIAL MALAR CHEEK

## 2024-02-06 ASSESSMENT — LOCATION SIMPLE DESCRIPTION DERM
LOCATION SIMPLE: CHEST
LOCATION SIMPLE: RIGHT CHEEK

## 2024-02-06 ASSESSMENT — LOCATION ZONE DERM
LOCATION ZONE: FACE
LOCATION ZONE: TRUNK

## 2024-03-27 ENCOUNTER — TELEPHONE (OUTPATIENT)
Dept: RHEUMATOLOGY | Facility: MEDICAL CENTER | Age: 79
End: 2024-03-27
Payer: MEDICARE

## 2024-03-27 DIAGNOSIS — M05.9 SEROPOSITIVE RHEUMATOID ARTHRITIS (HCC): ICD-10-CM

## 2024-03-27 NOTE — TELEPHONE ENCOUNTER
VOICEMAIL  1. Caller Name: Angela       Call Back Number: [phone]    2. Message: Patient having flare up in her legs, having difficulty standing/walking, requesting Prednisone prescription.    3. Patient approves office to leave a detailed voicemail/MyChart message: yes    Please advise,

## 2024-03-28 RX ORDER — PREDNISONE 5 MG/1
TABLET ORAL
Qty: 126 TABLET | Refills: 0 | Status: SHIPPED | OUTPATIENT
Start: 2024-03-28

## 2024-03-28 NOTE — TELEPHONE ENCOUNTER
Patient called office to follow up on Prednisone prescription, patient states she is having flare ups in her hands as well and is unable to put weight on her legs. Patient declined scheduling follow up visit and requests an urgent prescription.

## 2024-04-23 ENCOUNTER — HOSPITAL ENCOUNTER (OUTPATIENT)
Dept: LAB | Facility: MEDICAL CENTER | Age: 79
End: 2024-04-23
Attending: STUDENT IN AN ORGANIZED HEALTH CARE EDUCATION/TRAINING PROGRAM
Payer: MEDICARE

## 2024-04-23 DIAGNOSIS — D75.89 MACROCYTOSIS WITHOUT ANEMIA: ICD-10-CM

## 2024-04-23 DIAGNOSIS — M05.9 SEROPOSITIVE RHEUMATOID ARTHRITIS (HCC): ICD-10-CM

## 2024-04-23 DIAGNOSIS — D84.9 IMMUNOSUPPRESSED STATUS (HCC): ICD-10-CM

## 2024-04-23 LAB
ALBUMIN SERPL BCP-MCNC: 4.3 G/DL (ref 3.2–4.9)
ALBUMIN/GLOB SERPL: 1.4 G/DL
ALP SERPL-CCNC: 70 U/L (ref 30–99)
ALT SERPL-CCNC: 15 U/L (ref 2–50)
ANION GAP SERPL CALC-SCNC: 12 MMOL/L (ref 7–16)
AST SERPL-CCNC: 17 U/L (ref 12–45)
BASOPHILS # BLD AUTO: 1 % (ref 0–1.8)
BASOPHILS # BLD: 0.09 K/UL (ref 0–0.12)
BILIRUB SERPL-MCNC: 0.3 MG/DL (ref 0.1–1.5)
BUN SERPL-MCNC: 12 MG/DL (ref 8–22)
CALCIUM ALBUM COR SERPL-MCNC: 9.3 MG/DL (ref 8.5–10.5)
CALCIUM SERPL-MCNC: 9.5 MG/DL (ref 8.5–10.5)
CHLORIDE SERPL-SCNC: 93 MMOL/L (ref 96–112)
CO2 SERPL-SCNC: 23 MMOL/L (ref 20–33)
CREAT SERPL-MCNC: 0.72 MG/DL (ref 0.5–1.4)
CRP SERPL HS-MCNC: 0.46 MG/DL (ref 0–0.75)
EOSINOPHIL # BLD AUTO: 0.1 K/UL (ref 0–0.51)
EOSINOPHIL NFR BLD: 1.1 % (ref 0–6.9)
ERYTHROCYTE [DISTWIDTH] IN BLOOD BY AUTOMATED COUNT: 48.4 FL (ref 35.9–50)
ERYTHROCYTE [SEDIMENTATION RATE] IN BLOOD BY WESTERGREN METHOD: 20 MM/HOUR (ref 0–25)
FOLATE SERPL-MCNC: >40 NG/ML
GFR SERPLBLD CREATININE-BSD FMLA CKD-EPI: 86 ML/MIN/1.73 M 2
GLOBULIN SER CALC-MCNC: 3.1 G/DL (ref 1.9–3.5)
GLUCOSE SERPL-MCNC: 104 MG/DL (ref 65–99)
HCT VFR BLD AUTO: 40.9 % (ref 37–47)
HGB BLD-MCNC: 14.2 G/DL (ref 12–16)
IMM GRANULOCYTES # BLD AUTO: 0.1 K/UL (ref 0–0.11)
IMM GRANULOCYTES NFR BLD AUTO: 1.1 % (ref 0–0.9)
LYMPHOCYTES # BLD AUTO: 1.33 K/UL (ref 1–4.8)
LYMPHOCYTES NFR BLD: 14.1 % (ref 22–41)
MCH RBC QN AUTO: 33.6 PG (ref 27–33)
MCHC RBC AUTO-ENTMCNC: 34.7 G/DL (ref 32.2–35.5)
MCV RBC AUTO: 96.9 FL (ref 81.4–97.8)
MONOCYTES # BLD AUTO: 1.05 K/UL (ref 0–0.85)
MONOCYTES NFR BLD AUTO: 11.1 % (ref 0–13.4)
NEUTROPHILS # BLD AUTO: 6.79 K/UL (ref 1.82–7.42)
NEUTROPHILS NFR BLD: 71.6 % (ref 44–72)
NRBC # BLD AUTO: 0 K/UL
NRBC BLD-RTO: 0 /100 WBC (ref 0–0.2)
PLATELET # BLD AUTO: 281 K/UL (ref 164–446)
PMV BLD AUTO: 9.2 FL (ref 9–12.9)
POTASSIUM SERPL-SCNC: 4.3 MMOL/L (ref 3.6–5.5)
PROT SERPL-MCNC: 7.4 G/DL (ref 6–8.2)
RBC # BLD AUTO: 4.22 M/UL (ref 4.2–5.4)
SODIUM SERPL-SCNC: 128 MMOL/L (ref 135–145)
VIT B12 SERPL-MCNC: 1037 PG/ML (ref 211–911)
WBC # BLD AUTO: 9.5 K/UL (ref 4.8–10.8)

## 2024-04-23 PROCEDURE — 82607 VITAMIN B-12: CPT

## 2024-04-23 PROCEDURE — 36415 COLL VENOUS BLD VENIPUNCTURE: CPT

## 2024-04-23 PROCEDURE — 86140 C-REACTIVE PROTEIN: CPT

## 2024-04-23 PROCEDURE — 85025 COMPLETE CBC W/AUTO DIFF WBC: CPT

## 2024-04-23 PROCEDURE — 85652 RBC SED RATE AUTOMATED: CPT

## 2024-04-23 PROCEDURE — 80053 COMPREHEN METABOLIC PANEL: CPT

## 2024-04-23 PROCEDURE — 82746 ASSAY OF FOLIC ACID SERUM: CPT

## 2024-05-15 ENCOUNTER — APPOINTMENT (OUTPATIENT)
Dept: RHEUMATOLOGY | Facility: MEDICAL CENTER | Age: 79
End: 2024-05-15
Attending: STUDENT IN AN ORGANIZED HEALTH CARE EDUCATION/TRAINING PROGRAM
Payer: MEDICARE

## 2024-05-15 DIAGNOSIS — M15.9 PRIMARY OSTEOARTHRITIS INVOLVING MULTIPLE JOINTS: ICD-10-CM

## 2024-05-15 DIAGNOSIS — D84.9 IMMUNOSUPPRESSED STATUS (HCC): ICD-10-CM

## 2024-05-15 DIAGNOSIS — M05.9 SEROPOSITIVE RHEUMATOID ARTHRITIS (HCC): ICD-10-CM

## 2024-05-15 DIAGNOSIS — D22.9 MULTIPLE ATYPICAL SKIN MOLES: ICD-10-CM

## 2024-05-15 NOTE — PROGRESS NOTES
[facility] RHEUMATOLOGY  [address], [address], [place] [address]  Phone: [phone] ? Fax: [phone]  [facility]/Health-Services/Rheumatology    FOLLOW-UP VISIT NOTE      DATE OF SERVICE: 05/15/2024         Subjective     PRIMARY CARE PRACTITIONER:  YA Silvestre  [address] [place] [address]    PATIENT IDENTIFICATION:  Angela Winchester  [address]  [place] [address]    YOB: 1945    MEDICAL RECORD NUMBER: [id number]          CHIEF COMPLAINT:   No chief complaint on file.      RHEUMATOLOGIC HISTORY:  Angela Winchester is a 78 y.o. female with pertinent history notable for seropositive rheumatoid arthritis diagnosed in 2011 (symptomatic from 2009), osteoarthritis of multiple joints (hands, shoulders, knees, feet) s/p bilateral knee replacements, and DJD of cervical/thoracic spine. Previously under the care of a local rheumatologist who has retired (Dr. Conor Hull), she initially presented on 8/18/22 to establish care for continued management of her condition. Noted oral surgery in 5/2022 for which her DMARDs were held for almost a month prior, so had a flare for which Dr. Hull prescribed a 10-day course of prednisone 20mg taper which was very helpful. Reported residual but tolerable joint/muscle pain in her hands, wrists, left shoulder, neck, lower back, knees, ankles and feet. These were associated with up to 1 hour of morning stiffness that improved with activity but her joint pain tended to worsen with much activity over the course of the day. Noted that she had tried Voltaren gel and had steroid injections to her left shoulder and both knees in the past which were not very helpful. Noted that orthopedic surgery recommended left shoulder replacement but she was not inclined to undergoing surgery.     Pertinent treatments: Prednisone 20 mg tapers (on/off, most recent in 7/2023, effective), Remicade (discontinued after 3 infusions due to ineffectiveness),  Humira (effective for 8 years, then became ineffective), methotrexate (stopped in 8/2022 due to hair loss and macrocytic anemia), Xeljanz 11 mg SR daily (2020-present, effective), sulfasalazine 500>1000 mg twice daily (started 8/2022, dose increased 7/2023-present, effective).     Pertinent positive labs: Positive anti- and  (in 7/2019), TSH 5.640 (in 10/2023). Na 128 with Cl 93 (in 4/2024).     Pertinent negative labs: Negative HCV (in 11/2018) and HBV (in 10/2019), QTB (in 7/2019), acceptable CBC (in 4/2024), C-rp, sed rate (28>20) vitamin D (in 10/2023), eGFR, creatinine, and LFTs (in 1/2024).     Pertinent XR imaging:   Hands and feet (in 6/2019) with moderate osteoarthritis but no evidence of inflammatory arthropathy.   Shoulders (in 6/2019) with severe osteoarthritis of left GH joint, and mild bilateral osteoarthritis of AC joint.    INTERVAL HISTORY:  Reports interval history as noted on the questionnaire below or scanned under media tab.  No questionnaires on file.    REVIEW OF SYSTEMS:  Except as noted in the history above, relevant review of systems with emphasis on autoimmune rheumatic diseases was otherwise negative.      ACTIVE PROBLEM LIST:  Patient Active Problem List    Diagnosis Date Noted    Immunosuppressed status (HCC) 04/20/2023    Multiple atypical skin moles 04/20/2023    Long-term use of immunosuppressant medication 09/15/2022    Seropositive rheumatoid arthritis (HCC) 09/15/2022    Osteoarthritis involving multiple joints on both sides of body 09/15/2022       PAST MEDICAL HISTORY:  Past Medical History:   Diagnosis Date    Arthritis     Breath shortness     with exertion    Bronchitis 01/2018    Cancer ([facility]) 2009    LUNG CANCER= 8/13/2018 pt states she had lung resection and it was fungal infection, no cancer; Skin    Cataract     Emphysema of lung ([facility])     High cholesterol     Hypertension     Pain     knee/hand    Pneumonia 02/2018    RA (rheumatoid arthritis) ([facility])      Sleep apnea     CPAP with 2.5 liters oxygen    Snoring        PAST SURGICAL HISTORY:  Past Surgical History:   Procedure Laterality Date    KNEE ARTHROPLASTY TOTAL Right 8/27/2018    Procedure: KNEE ARTHROPLASTY TOTAL;  Surgeon: Fabian Kennedy M.D.;  Location: SURGERY [facility];  Service: Orthopedics    TIBIAL OSTEOTOMY Right 8/27/2018    Procedure: TIBIAL OSTEOTOMY - POSS TUBERCLE;  Surgeon: Fabian Kennedy M.D.;  Location: SURGERY [facility];  Service: Orthopedics    CATARACT PHACO WITH IOL  7/1/2013    Performed by Tyron Quiles M.D. at SURGERY [facility]    CATARACT PHACO WITH IOL  6/17/2013    Performed by Tyron Quiles M.D. at SURGERY [facility] ORS    CARPAL TUNNEL ENDOSCOPIC  2/5/2010    Performed by EREN KOVACS at SURGERY [facility] ORS    TUMOR EXCISION WITH BIOPSY Left 2010    left wrist for skin cancer, done in the office    THORACOSCOPY  6/8/2009    Performed by GANSER, JOHN H at SURGERY [facility] ORS       SOCIAL HISTORY:  Social History     Socioeconomic History    Marital status:      Spouse name: Not on file    Number of children: Not on file    Years of education: Not on file    Highest education level: Not on file   Occupational History    Not on file   Tobacco Use    Smoking status: Every Day     Current packs/day: 2.00     Average packs/day: 2.0 packs/day for 42.0 years (84.0 ttl pk-yrs)     Types: Cigarettes    Smokeless tobacco: Never    Tobacco comments:     2-3 ppd; 8/13/18 currently 0.5ppd   Vaping Use    Vaping status: Unknown   Substance and Sexual Activity    Alcohol use: No     Alcohol/week: 0.0 oz    Drug use: No    Sexual activity: Not on file   Other Topics Concern    Not on file   Social History Narrative    Not on file     Social Determinants of Health     Financial Resource Strain: Not on file   Food Insecurity: Not on file   Transportation Needs: Not on file   Physical Activity: Not on file   Stress: Not on file   Social  Connections: Not on file   Intimate Partner Violence: Not on file   Housing Stability: Not on file       FAMILY HISTORY:  No family history on file.    MEDICATIONS:  Current Outpatient Medications   Medication Sig    predniSONE (DELTASONE) 5 MG Tab Take 4 tablets daily for 7 days, then decrease by 0.5 tablet every 7 days until finished. Take in the morning with food.    sulfaSALAzine (AZULFIDINE EN-TAB) 500 MG EC tablet TAKE 2 TABLETS BY MOUTH TWICE DAILY WITH MEALS    benzonatate (TESSALON PERLES) 100 MG Cap Take 1 Capsule by mouth 3 times a day as needed for Cough.    XELJANZ XR 11 MG TABLET SR 24 HR TAKE 1 TABLET DAILY    amLODIPine (NORVASC) 10 MG Tab amlodipine 10 mg tablet    cyclobenzaprine (FLEXERIL) 10 mg Tab cyclobenzaprine 10 mg tablet    fluticasone (FLONASE) 50 MCG/ACT nasal spray fluticasone propionate 50 mcg/actuation nasal spray,suspension    potassium chloride ER (KLOR-CON) 10 MEQ tablet Take 10 mEq by mouth every day.    furosemide (LASIX) 20 MG Tab TAKE 1/2 TO 1 TABLET BY MOUTH DAILY    metFORMIN (GLUCOPHAGE) 500 MG Tab Take 500 mg by mouth 2 times a day.    TRELEGY ELLIPTA 200-62.5-25 MCG/INH AEROSOL POWDER, BREATH ACTIVATED     albuterol 108 (90 Base) MCG/ACT Aero Soln inhalation aerosol Inhale 1-2 Puffs by mouth every four hours as needed for Shortness of Breath.    Cholecalciferol (VITAMIN D3) 5000 units Cap Take 1 Cap by mouth every day.    losartan (COZAAR) 100 MG Tab Take 100 mg by mouth every day.    metoprolol SR (TOPROL XL) 25 MG TABLET SR 24 HR Take 25 mg by mouth every day.    ALPRAZolam (XANAX) 0.5 MG Tab Take 0.5 mg by mouth at bedtime as needed for Sleep.    atorvastatin (LIPITOR) 20 MG Tab Take 20 mg by mouth every evening.    hydrocodone-acetaminophen (MAXIDONE)  MG per tablet Take 1-2 Tabs by mouth every 6 hours as needed for Mild Pain.       ALLERGIES:   No Known Allergies    IMMUNIZATIONS:  Immunization History   Administered Date(s) Administered    [facility] SARS-CoV-2  "Vaccine 03/21/2021, 11/03/2021            Objective     Vital Signs: There were no vitals taken for this visit.There is no height or weight on file to calculate BMI.    General: Appears well and comfortable  Eyes: No scleral or conjunctival lesions  ENT: No apparent oral or nasal lesions  Head/Neck: No apparent scalp or neck lesions  Cardiovascular: Regular rate and rhythm  Respiratory: Breathing quiet and unlabored  Gastrointestinal: No apparent organomegaly or abdominal masses  Integumentary: Multiple hyperpigmented moles with skin tags on face and upper chest  Musculoskeletal: Mild to moderate tenderness of right knee medial joint line; minimal tenderness of hands and left shoulder AC joint with restricted range of motion; no definite periarticular soft tissue swelling, warmth, erythema, or overt synovitis at any joints  Neurologic: No focal sensory or motor deficits  Psychiatric: Mood and affect appropriate      LABORATORY RESULTS REVIEWED AND INTERPRETED BY ME:  Lab Results   Component Value Date/Time    CREACTPROT 0.46 04/23/2024 12:38 PM    SEDRATEWES 20 04/23/2024 12:38 PM    URICACID 6.3 03/20/2012 02:49 PM     No results found for: \"K9UEZXDUSIR\", \"D5JKLCDZTUR\", \"CRYOGLOBULIN\"  Lab Results   Component Value Date/Time    RHEUMFACTN 107 (H) 07/02/2019 01:13 PM    CCPANTIBODY 174 (H) 07/02/2019 01:13 PM     Lab Results   Component Value Date/Time    ANTINUCAB None Detected 07/02/2019 01:13 PM       Lab Results   Component Value Date/Time    TSHULTRASEN 5.640 (H) 10/11/2023 10:26 AM    FREET4 1.24 03/20/2012 02:49 PM       Lab Results   Component Value Date/Time    25HYDROXY 60 10/11/2023 10:26 AM     Lab Results   Component Value Date/Time    FERRITIN 175.8 03/31/2016 01:04 PM    IRON 64 03/31/2016 01:04 PM    TRANSFERRIN 216 03/31/2016 01:05 PM    FOLATE >40.0 04/23/2024 12:38 PM    PROTHROMBTM 10.6 06/04/2009 04:50 PM    INR 0.91 06/04/2009 04:50 PM     Lab Results   Component Value Date/Time    WBC 9.5 " 04/23/2024 12:38 PM    RBC 4.22 04/23/2024 12:38 PM    HEMOGLOBIN 14.2 04/23/2024 12:38 PM    HEMATOCRIT 40.9 04/23/2024 12:38 PM    MCV 96.9 04/23/2024 12:38 PM    MCH 33.6 (H) 04/23/2024 12:38 PM    MCHC 34.7 04/23/2024 12:38 PM    RDW 48.4 04/23/2024 12:38 PM    PLATELETCT 281 04/23/2024 12:38 PM    MPV 9.2 04/23/2024 12:38 PM    NEUTS 6.79 04/23/2024 12:38 PM    LYMPHOCYTES 14.10 (L) 04/23/2024 12:38 PM    MONOCYTES 11.10 04/23/2024 12:38 PM    EOSINOPHILS 1.10 04/23/2024 12:38 PM    BASOPHILS 1.00 04/23/2024 12:38 PM    ANISOCYTOSIS 1+ 07/02/2019 01:13 PM     Lab Results   Component Value Date/Time    ASTSGOT 17 04/23/2024 12:38 PM    ALTSGPT 15 04/23/2024 12:38 PM    ALKPHOSPHAT 70 04/23/2024 12:38 PM    TBILIRUBIN 0.3 04/23/2024 12:38 PM    TOTPROTEIN 7.4 04/23/2024 12:38 PM    ALBUMIN 4.3 04/23/2024 12:38 PM     Lab Results   Component Value Date/Time    SODIUM 128 (L) 04/23/2024 12:38 PM    POTASSIUM 4.3 04/23/2024 12:38 PM    CHLORIDE 93 (L) 04/23/2024 12:38 PM    CO2 23 04/23/2024 12:38 PM    GLUCOSE 104 (H) 04/23/2024 12:38 PM    BUN 12 04/23/2024 12:38 PM    CREATININE 0.72 04/23/2024 12:38 PM    CALCIUM 9.5 04/23/2024 12:38 PM    MAGNESIUM 1.9 03/31/2016 01:04 PM     Lab Results   Component Value Date/Time    MICROALBUR 2.2 10/11/2023 10:26 AM    MALBCRT 248 (H) 10/11/2023 10:26 AM     Lab Results   Component Value Date/Time    COLORURINE DK Yellow 08/13/2018 01:40 PM    SPECGRAVITY 1.021 08/13/2018 01:40 PM    PHURINE 6.0 08/13/2018 01:40 PM    GLUCOSEUR Negative 08/13/2018 01:40 PM    KETONES Negative 08/13/2018 01:40 PM    PROTEINURIN 30 (A) 08/13/2018 01:40 PM       Lab Results   Component Value Date/Time    HEPBSAG Negative 07/02/2019 01:13 PM    HEPBCORIGM Positive (A) 07/02/2019 01:13 PM    HEPBCORTOT Negative 10/01/2019 01:48 PM    HEPCAB Negative 07/02/2019 01:13 PM     Lab Results   Component Value Date/Time    CHOLSTRLTOT 165 10/11/2023 10:26 AM    LDL 82 10/11/2023 10:26 AM    HDL 49  10/11/2023 10:26 AM    TRIGLYCERIDE 169 (H) 10/11/2023 10:26 AM    HBA1C 6.5 (H) 11/06/2020 03:19 PM       RADIOLOGY RESULTS REVIEWED AND INTERPRETED BY ME:  Results for orders placed during the hospital encounter of 11/20/06    DX-KNEES-AP BILATERAL STANDING    Impression  IMPRESSION:    MILD SYMMETRIC MEDIAL COMPARTMENT FEMOROTIBIAL OSTEOARTHROSIS.  NO  EVIDENCE OF ANY INFLAMMATORY  PROCESS.    Results for orders placed during the hospital encounter of 11/22/11    DX-FOOT-COMPLETE 3+    Impression  1. No no fracture or malalignment.    2. Chronic changes as described in the findings section.    Results for orders placed during the hospital encounter of 11/22/11    DX-ANKLE 3+ VIEWS    Impression  Negative right ankle series.  Diffuse soft tissue swelling.    Results for orders placed during the hospital encounter of 02/14/08    DX-KNEE COMPLETE 4+    Impression  IMPRESSION:    1. NO ACUTE FRACTURE IDENTIFIED.    2. SMALL KNEE JOINT EFFUSION.    3. PERIARTICULAR DEOSSIFICATION AND MILD OSTEOARTHROSIS.    JT:dxm      Read By VERNELL RICHARDSON MD on Feb 14 2008  2:17PM  : JAVIER Transcription Date: Feb 15 2008  4:58PM  THIS DOCUMENT HAS BEEN ELECTRONICALLY SIGNED BY: VERNELL RICHARDSON MD on Feb 15  2008  5:38PM    Results for orders placed during the hospital encounter of 08/27/18    DX-KNEE 2- RIGHT    Impression  1. Status post right total knee replacement without evidence of hardware complication.    Results for orders placed during the hospital encounter of 11/20/06    DX-JOINT SURVEY-FEET SINGLE VIEW    Impression  IMPRESSION:    1. NO EVIDENCE OF INFLAMMATORY ARTHROPATHY.    2. BLAND DEGENERATIVE ARTHROSIS POLYARTICULAR.    GEK/abelb    Read By KJ KENNEDY MD on Nov 20 2006  1:26PM  : RADHA Transcription Date: Nov 20 2006  1:50PM  THIS DOCUMENT HAS BEEN ELECTRONICALLY SIGNED BY: KJ KENNEDY MD on  Nov 20 2006  3:39PM    Results for orders placed in visit on 08/01/17    DX-HAND 3+  RIGHT    Impression  Osteoarthritis affecting the interphalangeal joints with joint space narrowing, subchondral sclerosis and osteophyte formation.    No fracture or acute bony abnormality.    Results for orders placed during the hospital encounter of 06/03/19    DX-HAND 2- LEFT    Impression  1.  Moderate osteoarthritis.  2.  No evidence for inflammatory arthropathy.  3.  Evidence of old trauma to the distal radial metaphysis.    Results for orders placed during the hospital encounter of 11/20/06    DX-JOINT SURVEY-HANDS SINGLE VIEW    Impression  IMPRESSION:    BLAND DEGENERATIVE OSTEOARTHROSIS OF THE INTERPHALANGEAL JOINTS AND THUMB  BASE.  NO EVIDENCE OF INFLAMMATORY ARTHRITIS.    Results for orders placed during the hospital encounter of 06/03/19    DX-SHOULDER 2+ LEFT    Impression  1.  Severe degenerative change of LEFT shoulder with probable associated joint body.  2.  No fracture or dislocation.    Results for orders placed during the hospital encounter of 04/26/10    DS-BONE DENSITY STUDY (DEXA)    Impression  IMPRESSION:    ACCORDING TO THE WORLD HEALTH ORGANIZATION CLASSIFICATION, BONE MINERAL  DENSITY OF THIS PATIENT IS OSTEOPENIC.    JAL/kxm    Read By POLA WHITE MD on Apr 26 2010  1:55PM  : KXM1 Transcription Date: Apr 27 2010 10:21AM  THIS DOCUMENT HAS BEEN ELECTRONICALLY SIGNED BY: POLA WHITE MD on  Apr 28 2010 12:43PM    Results for orders placed during the hospital encounter of 04/22/09    CT-CHEST (THORAX) W/O    Impression  IMPRESSION:    1. 11 MM NONCALCIFIED SPICULATED SOFT TISSUE MASS ANTERIORLY IN THE RIGHT  LUNG APEX (IMAGE 30 ).  THIS IS SUSPICIOUS FOR BRONCHOGENIC  CARCINOMA.  GIVEN ITS SIZE, IT WOULD LIKELY BE SENSITIVE TO FDG IMAGING  IF METABOLICALLY ACTIVE, AS A CANCER WOULD BE EXPECTED TO BE.    2. SMALL NONSPECIFIC MEDIASTINAL ADENOPATHY.    3. EVIDENCE OF PRIOR GRANULOMATOUS EXPOSURE.    GEK/seb    Read By KJ KENNEDY MD on Apr 22 2009   4:47PM  : ILENE Transcription Date: Apr 23 2009  3:33PM  THIS DOCUMENT HAS BEEN ELECTRONICALLY SIGNED BY: KJ KENNEDY MD on  Apr 25 2009  4:16PM    Results for orders placed during the hospital encounter of 04/30/09    MR-KNEE-W/O    Impression  IMPRESSION:    1. MACERATED-TYPE TEAR SEEN INVOLVING THE POSTERIOR HORN AND BODY OF THE  LATERAL MENISCUS UNLESS THE PATIENT HAS HAD PRIOR PARTIAL MENISCECTOMY.    2. CHRONIC MILD SPRAIN TO THE ANTERIOR AND POSTERIOR CRUCIATE LIGAMENTS.    3. TRICOMPARTMENT DEGENERATIVE CHANGE WHICH APPEARS TO INVOLVE THE  PATELLOFEMORAL AND THE LATERAL FEMOROTIBIAL ARTICULATIONS TO GREATEST  DEGREE.  SOME DEGENERATIVE CHANGE IS ALSO SEEN INVOLVING THE PROXIMAL  TIBIOFIBULAR ARTICULATION.    4. MODERATE-SIZED JOINT EFFUSION WITH EXTENSIVE SYNOVITIS.    5. LIKELY INTRAARTICULAR OSSIFIC LOOSE BODY SEEN ADJACENT TO THE PROXIMAL  TIBIA POSTERIORLY.    6. MULTIPLE LOW SIGNAL AREAS SEEN WITHIN A MODERATE-SIZED BAKER CYST  WHICH MAY REPRESENT EITHER SYNOVITIS OR EXTRUDED INTRAARTICULAR LOOSE  BODIES.      JHW/denisham    Read By EREN MCADAMS MD on Apr 30 2009  9:00AM  : KXM1 Transcription Date: Apr 30 2009  9:03PM  THIS DOCUMENT HAS BEEN ELECTRONICALLY SIGNED BY: EREN MCADAMS MD on May  2 2009  9:26AM    All relevant laboratory and imaging results reported on this note were reviewed and interpreted by me.         Assessment & Plan     Angela Winchester is a 78 y.o. female with history and physical as noted above whose presentation merits the following clinical impressions and recommendations:    1. Seropositive rheumatoid arthritis (HCC)  Clinically and serologically low disease activity that appears to be well-controlled on the current regimen of Xeljanz and sulfasalazine, so no need for modification of treatment.   - CRP QUANTITIVE (NON-CARDIAC); Future  - Sed Rate; Future  - Continue Xeljanz 11 mg SR daily  - Continue sulfasalazine 1000 mg twice daily    Why not  LEF   Xeljanz = tofacitinib = anatoly inhibitor  Q3 mo CBC      2. Primary osteoarthritis involving multiple joints (hands, shoulders, knees, and feet)  Considered a significant component of her overall joint pain burden which can be managed supportively.  - Voltaren 1% gel and Tylenol Arthritis with or without oral NSAIDs as needed  - Consider intra-articular steroid injection if that becomes necessary     3. Multiple atypical skin moles  Raises concern for cutaneous neoplasm so need skin biopsy.  - Routine follow-up with dermatology recommended     4. Immunosuppressed status (HCC)  Presently with no history, physical, or laboratory evidence to suggest significant adverse drug effects or opportunistic infections, but need routine monitoring per guidelines.  - CBC WITH DIFFERENTIAL; Future  - Comp Metabolic Panel; Future  - Need to ascertain age-appropriate vaccines in addition to the ones listed above under immunizations    1. Seropositive rheumatoid arthritis (HCC)    2. Primary osteoarthritis involving multiple joints (hands, shoulders, knees, and feet)    3. Immunosuppressed status (HCC)    4. Multiple atypical skin moles    The above assessment and plan were discussed with the patient who acknowledged understanding of the plan.    FOLLOW-UP: No follow-ups on file.         Thank you for the opportunity to participate in the care of Angela Winchester.    Papito Corrigan D.O.   [facility] Internal Medicine, PGY-3

## 2024-06-12 ENCOUNTER — OFFICE VISIT (OUTPATIENT)
Dept: RHEUMATOLOGY | Facility: MEDICAL CENTER | Age: 79
End: 2024-06-12
Attending: STUDENT IN AN ORGANIZED HEALTH CARE EDUCATION/TRAINING PROGRAM
Payer: MEDICARE

## 2024-06-12 VITALS
HEART RATE: 90 BPM | OXYGEN SATURATION: 91 % | SYSTOLIC BLOOD PRESSURE: 134 MMHG | BODY MASS INDEX: 23.9 KG/M2 | TEMPERATURE: 98.7 F | DIASTOLIC BLOOD PRESSURE: 80 MMHG | HEIGHT: 64 IN | WEIGHT: 140 LBS

## 2024-06-12 DIAGNOSIS — M05.9 SEROPOSITIVE RHEUMATOID ARTHRITIS (HCC): ICD-10-CM

## 2024-06-12 DIAGNOSIS — D22.9 MULTIPLE ATYPICAL SKIN MOLES: ICD-10-CM

## 2024-06-12 DIAGNOSIS — D84.9 IMMUNOSUPPRESSED STATUS (HCC): ICD-10-CM

## 2024-06-12 DIAGNOSIS — M15.9 OSTEOARTHRITIS INVOLVING MULTIPLE JOINTS ON BOTH SIDES OF BODY: ICD-10-CM

## 2024-06-12 PROCEDURE — 3075F SYST BP GE 130 - 139MM HG: CPT | Performed by: STUDENT IN AN ORGANIZED HEALTH CARE EDUCATION/TRAINING PROGRAM

## 2024-06-12 PROCEDURE — 3079F DIAST BP 80-89 MM HG: CPT | Performed by: STUDENT IN AN ORGANIZED HEALTH CARE EDUCATION/TRAINING PROGRAM

## 2024-06-12 PROCEDURE — 99214 OFFICE O/P EST MOD 30 MIN: CPT | Performed by: STUDENT IN AN ORGANIZED HEALTH CARE EDUCATION/TRAINING PROGRAM

## 2024-06-12 PROCEDURE — 99212 OFFICE O/P EST SF 10 MIN: CPT | Performed by: STUDENT IN AN ORGANIZED HEALTH CARE EDUCATION/TRAINING PROGRAM

## 2024-06-12 ASSESSMENT — FIBROSIS 4 INDEX: FIB4 SCORE: 1.19

## 2024-06-12 NOTE — PROGRESS NOTES
[facility] RHEUMATOLOGY  [address], [address], [place] [address]  Phone: [phone] ? Fax: [phone]  [facility]/Health-Services/Rheumatology    FOLLOW-UP VISIT NOTE      DATE OF SERVICE: 06/12/2024         Subjective     PRIMARY CARE PRACTITIONER:  YA Silvestre  [address] [id number]    PATIENT IDENTIFICATION:  Angela Winchester  [address]  [place] [address]    YOB: 1945    MEDICAL RECORD NUMBER: [id number]          CHIEF COMPLAINT:   Chief Complaint   Patient presents with    Follow-Up     Seropositive rheumatoid arthritis (HCC)       RHEUMATOLOGIC HISTORY:  Angela Winchester is a 78 y.o. female with pertinent history notable for seropositive rheumatoid arthritis diagnosed in 2011 (symptomatic from 2009), osteoarthritis of multiple joints (hands, shoulders, knees, feet) s/p bilateral knee replacements, and DJD of cervical/thoracic spine. Previously under the care of a local rheumatologist who has retired (Dr. Conor Hull), she initially presented on 8/18/22 to establish care for continued management of her condition. Noted oral surgery in 5/2022 for which her DMARDs were held for almost a month prior, so had a flare for which Dr. Hull prescribed a 10-day course of prednisone 20mg taper which was very helpful. Reported residual but tolerable joint/muscle pain in her hands, wrists, left shoulder, neck, lower back, knees, ankles and feet. These were associated with up to 1 hour of morning stiffness that improved with activity but her joint pain tended to worsen with much activity over the course of the day. Noted that she had tried Voltaren gel and had steroid injections to her left shoulder and both knees in the past which were not very helpful. She noted that orthopedic surgery recommended left shoulder replacement but she was not inclined to undergoing surgery.     Pertinent treatments: Prednisone 20 mg tapers (on/off, most recent in 7/2023,  effective), Remicade (discontinued after 3 infusions due to ineffectiveness), Humira (effective for 8 years, then became ineffective), methotrexate (stopped in 8/2022 due to hair loss and macrocytic anemia), Xeljanz 11 mg SR daily (2020-present, effective), sulfasalazine 500>1000 mg twice daily (started 8/2022, dose increased 7/2023-present, effective).     Pertinent positive labs: Positive anti- and  (in 7/2019).    Pertinent negative labs: Negative HCV (in 11/2018) and HBV (in 10/2019), QTB (in 7/2019), vitamin D (in 10/2023), vitamin B12, folate, CRP, ESR, eGFR, creatinine, LFTs, and acceptable CBC (in 4/2024).    Pertinent XR imaging: Hands and feet (in 6/2019) with moderate osteoarthritis but no evidence of inflammatory arthropathy. Shoulders (in 6/2019) with severe osteoarthritis of left GH joint, and mild bilateral osteoarthritis of AC joint.      INTERVAL HISTORY:  Reports interval history as noted on the questionnaire below or scanned under media tab.  Notably recent viral URI with earaches in 5/2024 that seem to have exacerbated her symptoms including pain/stiffness in her hands, elbows, shoulders, knees, feet, neck/upper and lower back, but presently doing much better.  Feels that her current regimen of Xeljanz and sulfasalazine remain helpful.    REVIEW OF SYSTEMS:  Except as noted in the history above, relevant review of systems with emphasis on autoimmune rheumatic diseases was otherwise negative.      ACTIVE PROBLEM LIST:  Patient Active Problem List    Diagnosis Date Noted    Immunosuppressed status (HCC) 04/20/2023    Multiple atypical skin moles 04/20/2023    Long-term use of immunosuppressant medication 09/15/2022    Seropositive rheumatoid arthritis (HCC) 09/15/2022    Osteoarthritis involving multiple joints on both sides of body 09/15/2022       PAST MEDICAL HISTORY:  Past Medical History:   Diagnosis Date    Arthritis     Breath shortness     with exertion    Bronchitis 01/2018     Cancer (HCC) 2009    LUNG CANCER= 8/13/2018 pt states she had lung resection and it was fungal infection, no cancer; Skin    Cataract     Emphysema of lung ([facility])     High cholesterol     Hypertension     Pain     knee/hand    Pneumonia 02/2018    RA (rheumatoid arthritis) ([facility])     Sleep apnea     CPAP with 2.5 liters oxygen    Snoring        PAST SURGICAL HISTORY:  Past Surgical History:   Procedure Laterality Date    KNEE ARTHROPLASTY TOTAL Right 8/27/2018    Procedure: KNEE ARTHROPLASTY TOTAL;  Surgeon: Fabian Kennedy M.D.;  Location: SURGERY [facility];  Service: Orthopedics    TIBIAL OSTEOTOMY Right 8/27/2018    Procedure: TIBIAL OSTEOTOMY - POSS TUBERCLE;  Surgeon: Fabian Kennedy M.D.;  Location: SURGERY [facility];  Service: Orthopedics    CATARACT PHACO WITH IOL  7/1/2013    Performed by Tyron Quiles M.D. at SURGERY [facility]    CATARACT PHACO WITH IOL  6/17/2013    Performed by Tyron Quiles M.D. at SURGERY [facility]    CARPAL TUNNEL ENDOSCOPIC  2/5/2010    Performed by EREN KOVACS at SURGERY SAME DAY [facility] ORS    TUMOR EXCISION WITH BIOPSY Left 2010    left wrist for skin cancer, done in the office    THORACOSCOPY  6/8/2009    Performed by GANSER, JOHN H at SURGERY [facility] ORS       SOCIAL HISTORY:  Social History     Socioeconomic History    Marital status:      Spouse name: Not on file    Number of children: Not on file    Years of education: Not on file    Highest education level: Not on file   Occupational History    Not on file   Tobacco Use    Smoking status: Every Day     Current packs/day: 2.00     Average packs/day: 2.0 packs/day for 42.0 years (84.0 ttl pk-yrs)     Types: Cigarettes    Smokeless tobacco: Never    Tobacco comments:     2-3 ppd; 8/13/18 currently 0.5ppd   Vaping Use    Vaping status: Unknown   Substance and Sexual Activity    Alcohol use: No     Alcohol/week: 0.0 oz    Drug use: No    Sexual activity: Not on file   Other  Topics Concern    Not on file   Social History Narrative    Not on file     Social Determinants of Health     Financial Resource Strain: Not on file   Food Insecurity: Not on file   Transportation Needs: Not on file   Physical Activity: Not on file   Stress: Not on file   Social Connections: Not on file   Intimate Partner Violence: Not on file   Housing Stability: Not on file     FAMILY HISTORY:  History reviewed. No pertinent family history.      MEDICATIONS:  Current Outpatient Medications   Medication Sig    sulfaSALAzine (AZULFIDINE EN-TAB) 500 MG EC tablet TAKE 2 TABLETS BY MOUTH TWICE DAILY WITH MEALS    benzonatate (TESSALON PERLES) 100 MG Cap Take 1 Capsule by mouth 3 times a day as needed for Cough.    XELJANZ XR 11 MG TABLET SR 24 HR TAKE 1 TABLET DAILY    amLODIPine (NORVASC) 10 MG Tab amlodipine 10 mg tablet    cyclobenzaprine (FLEXERIL) 10 mg Tab cyclobenzaprine 10 mg tablet    fluticasone (FLONASE) 50 MCG/ACT nasal spray fluticasone propionate 50 mcg/actuation nasal spray,suspension    potassium chloride ER (KLOR-CON) 10 MEQ tablet Take 10 mEq by mouth every day.    furosemide (LASIX) 20 MG Tab TAKE 1/2 TO 1 TABLET BY MOUTH DAILY    metFORMIN (GLUCOPHAGE) 500 MG Tab Take 500 mg by mouth 2 times a day.    TRELEGY ELLIPTA 200-62.5-25 MCG/INH AEROSOL POWDER, BREATH ACTIVATED     albuterol 108 (90 Base) MCG/ACT Aero Soln inhalation aerosol Inhale 1-2 Puffs by mouth every four hours as needed for Shortness of Breath.    Cholecalciferol (VITAMIN D3) 5000 units Cap Take 1 Cap by mouth every day.    losartan (COZAAR) 100 MG Tab Take 100 mg by mouth every day.    metoprolol SR (TOPROL XL) 25 MG TABLET SR 24 HR Take 25 mg by mouth every day.    ALPRAZolam (XANAX) 0.5 MG Tab Take 0.5 mg by mouth at bedtime as needed for Sleep.    atorvastatin (LIPITOR) 20 MG Tab Take 20 mg by mouth every evening.    hydrocodone-acetaminophen (MAXIDONE)  MG per tablet Take 1-2 Tabs by mouth every 6 hours as needed for Mild  "Pain.    predniSONE (DELTASONE) 5 MG Tab Take 4 tablets daily for 7 days, then decrease by 0.5 tablet every 7 days until finished. Take in the morning with food. (Patient not taking: Reported on 6/12/2024)       ALLERGIES:   No Known Allergies    IMMUNIZATIONS:  Immunization History   Administered Date(s) Administered    Deepak SARS-CoV-2 Vaccine 03/21/2021, 11/03/2021            Objective     Vital Signs: /80 (BP Location: Left arm, Patient Position: Sitting, BP Cuff Size: Adult)   Pulse 90   Temp 37.1 °C (98.7 °F) (Temporal)   Ht 1.626 m (5' 4\")   Wt 63.5 kg (140 lb)   SpO2 91% Body mass index is 24.03 kg/m².    General: Appears well and comfortable  Eyes: No scleral or conjunctival lesions  ENT: No apparent oral or nasal lesions  Head/Neck: No apparent scalp or neck lesions  Cardiovascular: Regular rate and rhythm  Respiratory: Breathing quiet and unlabored  Gastrointestinal: No apparent organomegaly or abdominal masses  Integumentary: Multiple hyperpigmented moles with skin tags on face and upper chest  Musculoskeletal: Poorly localized minimal tenderness of hands, elbows, shoulders, knees, and feet; no periarticular soft tissue swelling, warmth, erythema, or overt synovitis at any joints  Neurologic: No focal sensory or motor deficits  Psychiatric: Mood and affect appropriate      LABORATORY RESULTS REVIEWED AND INTERPRETED BY ME:  Lab Results   Component Value Date/Time    CREACTPROT 0.46 04/23/2024 12:38 PM    SEDRATEWES 20 04/23/2024 12:38 PM    URICACID 6.3 03/20/2012 02:49 PM     Lab Results   Component Value Date/Time    RHEUMFACTN 107 (H) 07/02/2019 01:13 PM    CCPANTIBODY 174 (H) 07/02/2019 01:13 PM     Lab Results   Component Value Date/Time    ANTINUCAB None Detected 07/02/2019 01:13 PM     Lab Results   Component Value Date/Time    TSHULTRASEN 5.640 (H) 10/11/2023 10:26 AM    FREET4 1.24 03/20/2012 02:49 PM     Lab Results   Component Value Date/Time    25HYDROXY 60 10/11/2023 10:26 AM "     Lab Results   Component Value Date/Time    FERRITIN 175.8 03/31/2016 01:04 PM    IRON 64 03/31/2016 01:04 PM    TRANSFERRIN 216 03/31/2016 01:05 PM    FOLATE >40.0 04/23/2024 12:38 PM    PROTHROMBTM 10.6 06/04/2009 04:50 PM    INR 0.91 06/04/2009 04:50 PM     Lab Results   Component Value Date/Time    WBC 9.5 04/23/2024 12:38 PM    RBC 4.22 04/23/2024 12:38 PM    HEMOGLOBIN 14.2 04/23/2024 12:38 PM    HEMATOCRIT 40.9 04/23/2024 12:38 PM    MCV 96.9 04/23/2024 12:38 PM    MCH 33.6 (H) 04/23/2024 12:38 PM    MCHC 34.7 04/23/2024 12:38 PM    RDW 48.4 04/23/2024 12:38 PM    PLATELETCT 281 04/23/2024 12:38 PM    MPV 9.2 04/23/2024 12:38 PM    NEUTS 6.79 04/23/2024 12:38 PM    LYMPHOCYTES 14.10 (L) 04/23/2024 12:38 PM    MONOCYTES 11.10 04/23/2024 12:38 PM    EOSINOPHILS 1.10 04/23/2024 12:38 PM    BASOPHILS 1.00 04/23/2024 12:38 PM    ANISOCYTOSIS 1+ 07/02/2019 01:13 PM     Lab Results   Component Value Date/Time    ASTSGOT 17 04/23/2024 12:38 PM    ALTSGPT 15 04/23/2024 12:38 PM    ALKPHOSPHAT 70 04/23/2024 12:38 PM    TBILIRUBIN 0.3 04/23/2024 12:38 PM    TOTPROTEIN 7.4 04/23/2024 12:38 PM    ALBUMIN 4.3 04/23/2024 12:38 PM     Lab Results   Component Value Date/Time    SODIUM 128 (L) 04/23/2024 12:38 PM    POTASSIUM 4.3 04/23/2024 12:38 PM    CHLORIDE 93 (L) 04/23/2024 12:38 PM    CO2 23 04/23/2024 12:38 PM    GLUCOSE 104 (H) 04/23/2024 12:38 PM    BUN 12 04/23/2024 12:38 PM    CREATININE 0.72 04/23/2024 12:38 PM    CALCIUM 9.5 04/23/2024 12:38 PM    MAGNESIUM 1.9 03/31/2016 01:04 PM     Lab Results   Component Value Date/Time    MICROALBUR 2.2 10/11/2023 10:26 AM    MALBCRT 248 (H) 10/11/2023 10:26 AM     Lab Results   Component Value Date/Time    COLORURINE DK Yellow 08/13/2018 01:40 PM    SPECGRAVITY 1.021 08/13/2018 01:40 PM    PHURINE 6.0 08/13/2018 01:40 PM    GLUCOSEUR Negative 08/13/2018 01:40 PM    KETONES Negative 08/13/2018 01:40 PM    PROTEINURIN 30 (A) 08/13/2018 01:40 PM     Lab Results   Component  Value Date/Time    HEPBSAG Negative 07/02/2019 01:13 PM    HEPBCORIGM Positive (A) 07/02/2019 01:13 PM    HEPBCORTOT Negative 10/01/2019 01:48 PM    HEPCAB Negative 07/02/2019 01:13 PM     Lab Results   Component Value Date/Time    CHOLSTRLTOT 165 10/11/2023 10:26 AM    LDL 82 10/11/2023 10:26 AM    HDL 49 10/11/2023 10:26 AM    TRIGLYCERIDE 169 (H) 10/11/2023 10:26 AM    HBA1C 6.5 (H) 11/06/2020 03:19 PM       RADIOLOGY RESULTS REVIEWED AND INTERPRETED BY ME:  Results for orders placed during the hospital encounter of 11/20/06    DX-KNEES-AP BILATERAL STANDING    Impression  IMPRESSION:    MILD SYMMETRIC MEDIAL COMPARTMENT FEMOROTIBIAL OSTEOARTHROSIS.  NO  EVIDENCE OF ANY INFLAMMATORY  PROCESS.    Results for orders placed during the hospital encounter of 11/22/11    DX-FOOT-COMPLETE 3+    Impression  1. No no fracture or malalignment.    2. Chronic changes as described in the findings section.    Results for orders placed during the hospital encounter of 11/22/11    DX-ANKLE 3+ VIEWS    Impression  Negative right ankle series.  Diffuse soft tissue swelling.    Results for orders placed during the hospital encounter of 02/14/08    DX-KNEE COMPLETE 4+    Impression  IMPRESSION:    1. NO ACUTE FRACTURE IDENTIFIED.    2. SMALL KNEE JOINT EFFUSION.    3. PERIARTICULAR DEOSSIFICATION AND MILD OSTEOARTHROSIS.    Results for orders placed during the hospital encounter of 08/27/18    DX-KNEE 2- RIGHT    Impression  1. Status post right total knee replacement without evidence of hardware complication.    Results for orders placed during the hospital encounter of 11/20/06    DX-JOINT SURVEY-FEET SINGLE VIEW    Impression  IMPRESSION:    1. NO EVIDENCE OF INFLAMMATORY ARTHROPATHY.    2. BLAND DEGENERATIVE ARTHROSIS POLYARTICULAR.    Results for orders placed in visit on 08/01/17    DX-HAND 3+ RIGHT    Impression  Osteoarthritis affecting the interphalangeal joints with joint space narrowing, subchondral sclerosis and  osteophyte formation.    No fracture or acute bony abnormality.    Results for orders placed during the hospital encounter of 06/03/19    DX-HAND 2- LEFT    Impression  1.  Moderate osteoarthritis.  2.  No evidence for inflammatory arthropathy.  3.  Evidence of old trauma to the distal radial metaphysis.    Results for orders placed during the hospital encounter of 11/20/06    DX-JOINT SURVEY-HANDS SINGLE VIEW    Impression  IMPRESSION:    BLAND DEGENERATIVE OSTEOARTHROSIS OF THE INTERPHALANGEAL JOINTS AND THUMB  BASE.  NO EVIDENCE OF INFLAMMATORY ARTHRITIS.    Results for orders placed during the hospital encounter of 06/03/19    DX-SHOULDER 2+ LEFT    Impression  1.  Severe degenerative change of LEFT shoulder with probable associated joint body.  2.  No fracture or dislocation.    Results for orders placed during the hospital encounter of 04/26/10    DS-BONE DENSITY STUDY (DEXA)    Impression  IMPRESSION:    ACCORDING TO THE WORLD HEALTH ORGANIZATION CLASSIFICATION, BONE MINERAL  DENSITY OF THIS PATIENT IS OSTEOPENIC.    Results for orders placed during the hospital encounter of 04/22/09    CT-CHEST (THORAX) W/O    Impression  IMPRESSION:    1. 11 MM NONCALCIFIED SPICULATED SOFT TISSUE MASS ANTERIORLY IN THE RIGHT  LUNG APEX (IMAGE 30 ).  THIS IS SUSPICIOUS FOR BRONCHOGENIC  CARCINOMA.  GIVEN ITS SIZE, IT WOULD LIKELY BE SENSITIVE TO FDG IMAGING  IF METABOLICALLY ACTIVE, AS A CANCER WOULD BE EXPECTED TO BE.    2. SMALL NONSPECIFIC MEDIASTINAL ADENOPATHY.    3. EVIDENCE OF PRIOR GRANULOMATOUS EXPOSURE.    Results for orders placed during the hospital encounter of 04/30/09    MR-KNEE-W/O    Impression  IMPRESSION:    1. MACERATED-TYPE TEAR SEEN INVOLVING THE POSTERIOR HORN AND BODY OF THE  LATERAL MENISCUS UNLESS THE PATIENT HAS HAD PRIOR PARTIAL MENISCECTOMY.    2. CHRONIC MILD SPRAIN TO THE ANTERIOR AND POSTERIOR CRUCIATE LIGAMENTS.    3. TRICOMPARTMENT DEGENERATIVE CHANGE WHICH APPEARS TO INVOLVE  THE  PATELLOFEMORAL AND THE LATERAL FEMOROTIBIAL ARTICULATIONS TO GREATEST  DEGREE.  SOME DEGENERATIVE CHANGE IS ALSO SEEN INVOLVING THE PROXIMAL  TIBIOFIBULAR ARTICULATION.    4. MODERATE-SIZED JOINT EFFUSION WITH EXTENSIVE SYNOVITIS.    5. LIKELY INTRAARTICULAR OSSIFIC LOOSE BODY SEEN ADJACENT TO THE PROXIMAL  TIBIA POSTERIORLY.    6. MULTIPLE LOW SIGNAL AREAS SEEN WITHIN A MODERATE-SIZED BAKER CYST  WHICH MAY REPRESENT EITHER SYNOVITIS OR EXTRUDED INTRAARTICULAR LOOSE  BODIES.      All relevant laboratory and imaging results reported on this note were reviewed and interpreted by me.         Assessment & Plan     Angela Winchester is a 78 y.o. female with history as noted above whose presentation merits the following clinical impressions and recommendations:    1. Seropositive rheumatoid arthritis (HCC)  Clinically and serologically low disease activity that appears to be well-controlled on the current regimen of Xeljanz and sulfasalazine, so no need for modification of treatment.  - CRP QUANTITIVE (NON-CARDIAC); Future  - Sed Rate; Future  - Continue Xeljanz 11 mg SR daily  - Continue sulfasalazine 1000 mg twice daily  - Consider switching to Rinvoq depending on her clinical trajectory  - Consider prednisone 20 mg taper in case of another flare as in the past    2. Osteoarthritis involving multiple joints on both sides of body  Presumably the most significant etiology of overall joint pain which can be managed supportively. She is not inclined to intra-articular steroid injection due to apparent bad experience in the past.  - Tylenol Arthritis and Voltaren 1% gel with or without oral NSAIDs as needed    3. Multiple atypical skin moles  Previously evaluated by dermatology and underwent skin biopsy which reportedly showed no evidence of malignancy.  - Recommend routine follow-up with dermatology    4. Immunosuppressed status (HCC)  Presently with no history, physical, or laboratory evidence to suggest significant  adverse drug effects or opportunistic infections, but need routine monitoring per guidelines.  - CBC WITH DIFFERENTIAL; Future  - Comp Metabolic Panel; Future  - Need to ascertain age-appropriate vaccines in addition to the ones listed above under immunizations      The above assessment and plan were discussed with the patient who acknowledged understanding of the plan.    FOLLOW-UP: Return in about 6 months (around 12/12/2024) for Short.         Thank you for the opportunity to participate in the care of Angela Winchester.    Basil Ren MD, MS, FACR  Rheumatologist, [facility], [facility]   of Clinical Medicine, Department of Internal Medicine  [facility] [employer]

## 2024-06-12 NOTE — PATIENT INSTRUCTIONS
AFTER VISIT INSTRUCTIONS    Below are important information to help you navigate your healthcare needs and help us serve you safely and effectively:  If laboratory tests and/or imaging studies were ordered, remember to go get them done as instructed.  If new prescriptions and/or refills were sent, remember to go pick them up from your local pharmacy, or call the specialty pharmacy to request shipment.  Always take your prescription medications exactly as prescribed unless instructed otherwise.  Note that antirheumatic drugs and steroids are immunosuppressive which means they increase your risk of infections and have multiple potential adverse effects on various organ systems in your body, though many of them are uncommon.  It is important that you are up-to-date on age-appropriate immunizations, particularly shingles and bacterial/viral pneumonia vaccines, which you can request from me or your primary care provider.  Be sure to read the drug package inserts to learn about the potential side effects of your medications before you start taking them.  If you experience any significant drug side effects, stop taking the medication and notify me promptly, and depending on the severity of the side effects, consider going to an urgent care or emergency department for immediate attention.  If there are significant findings on your lab tests and imaging studies that warrant further action, I will notify you with explanations via VidSyshart or phone call, otherwise you can view them on EdeniQ and let me know if you have any questions.  Note that EdeniQ messages are typically read during office hours and may take 1-7 business days before a response depending on the urgency of the situation and how busy my clinic schedule is.  In general, EdeniQ messaging is for non-urgent matters that do not require immediate attention, so for urgent matters that cannot wait, you are advised to go to an urgent care.  You are granted [employer]  access to my documentation of your visit and are encouraged to read my note which details my assessment and plan for your condition.  To learn more about your condition and rheumatic diseases evaluated and treated by rheumatologists, as well as gain access to many helpful resources about these diseases, visit our website: [email or web address].  To properly dispose of your unused, unwanted, or residual medications/supplies, visit the Drug Enforcement Administration website to locate your closest drop-off location: www.[place].gov/everyday-takeback-day.

## 2024-08-06 DIAGNOSIS — M05.9 SEROPOSITIVE RHEUMATOID ARTHRITIS (HCC): ICD-10-CM

## 2024-08-08 RX ORDER — TOFACITINIB 11 MG/1
1 TABLET, FILM COATED, EXTENDED RELEASE ORAL DAILY
Qty: 90 TABLET | Refills: 3 | Status: SHIPPED | OUTPATIENT
Start: 2024-08-08

## 2024-09-16 ENCOUNTER — OFFICE VISIT (OUTPATIENT)
Dept: RHEUMATOLOGY | Facility: MEDICAL CENTER | Age: 79
End: 2024-09-16
Attending: STUDENT IN AN ORGANIZED HEALTH CARE EDUCATION/TRAINING PROGRAM
Payer: MEDICARE

## 2024-09-16 ENCOUNTER — HOSPITAL ENCOUNTER (OUTPATIENT)
Dept: LAB | Facility: MEDICAL CENTER | Age: 79
End: 2024-09-16
Attending: STUDENT IN AN ORGANIZED HEALTH CARE EDUCATION/TRAINING PROGRAM
Payer: MEDICARE

## 2024-09-16 VITALS
SYSTOLIC BLOOD PRESSURE: 124 MMHG | DIASTOLIC BLOOD PRESSURE: 64 MMHG | HEART RATE: 71 BPM | OXYGEN SATURATION: 98 % | BODY MASS INDEX: 23.99 KG/M2 | HEIGHT: 64 IN | WEIGHT: 140.5 LBS | TEMPERATURE: 97.5 F

## 2024-09-16 DIAGNOSIS — M15.9 OSTEOARTHRITIS INVOLVING MULTIPLE JOINTS ON BOTH SIDES OF BODY: ICD-10-CM

## 2024-09-16 DIAGNOSIS — M05.9 SEROPOSITIVE RHEUMATOID ARTHRITIS (HCC): ICD-10-CM

## 2024-09-16 DIAGNOSIS — D84.9 IMMUNOSUPPRESSED STATUS (HCC): ICD-10-CM

## 2024-09-16 DIAGNOSIS — D22.9 MULTIPLE ATYPICAL SKIN MOLES: ICD-10-CM

## 2024-09-16 LAB
ALBUMIN SERPL BCP-MCNC: 3.8 G/DL (ref 3.2–4.9)
ALBUMIN/GLOB SERPL: 1.2 G/DL
ALP SERPL-CCNC: 79 U/L (ref 30–99)
ALT SERPL-CCNC: 13 U/L (ref 2–50)
ANION GAP SERPL CALC-SCNC: 15 MMOL/L (ref 7–16)
AST SERPL-CCNC: 21 U/L (ref 12–45)
BASOPHILS # BLD AUTO: 0.9 % (ref 0–1.8)
BASOPHILS # BLD: 0.08 K/UL (ref 0–0.12)
BILIRUB SERPL-MCNC: 0.2 MG/DL (ref 0.1–1.5)
BUN SERPL-MCNC: 9 MG/DL (ref 8–22)
CALCIUM ALBUM COR SERPL-MCNC: 9.5 MG/DL (ref 8.5–10.5)
CALCIUM SERPL-MCNC: 9.3 MG/DL (ref 8.5–10.5)
CHLORIDE SERPL-SCNC: 97 MMOL/L (ref 96–112)
CO2 SERPL-SCNC: 21 MMOL/L (ref 20–33)
CREAT SERPL-MCNC: 0.63 MG/DL (ref 0.5–1.4)
CRP SERPL HS-MCNC: 2.86 MG/DL (ref 0–0.75)
EOSINOPHIL # BLD AUTO: 0.1 K/UL (ref 0–0.51)
EOSINOPHIL NFR BLD: 1.1 % (ref 0–6.9)
ERYTHROCYTE [DISTWIDTH] IN BLOOD BY AUTOMATED COUNT: 52.3 FL (ref 35.9–50)
ERYTHROCYTE [SEDIMENTATION RATE] IN BLOOD BY WESTERGREN METHOD: 92 MM/HOUR (ref 0–25)
GFR SERPLBLD CREATININE-BSD FMLA CKD-EPI: 91 ML/MIN/1.73 M 2
GLOBULIN SER CALC-MCNC: 3.1 G/DL (ref 1.9–3.5)
GLUCOSE SERPL-MCNC: 90 MG/DL (ref 65–99)
HCT VFR BLD AUTO: 37.6 % (ref 37–47)
HGB BLD-MCNC: 12.4 G/DL (ref 12–16)
IMM GRANULOCYTES # BLD AUTO: 0.07 K/UL (ref 0–0.11)
IMM GRANULOCYTES NFR BLD AUTO: 0.8 % (ref 0–0.9)
LYMPHOCYTES # BLD AUTO: 2.31 K/UL (ref 1–4.8)
LYMPHOCYTES NFR BLD: 26 % (ref 22–41)
MCH RBC QN AUTO: 31.2 PG (ref 27–33)
MCHC RBC AUTO-ENTMCNC: 33 G/DL (ref 32.2–35.5)
MCV RBC AUTO: 94.7 FL (ref 81.4–97.8)
MONOCYTES # BLD AUTO: 0.75 K/UL (ref 0–0.85)
MONOCYTES NFR BLD AUTO: 8.4 % (ref 0–13.4)
NEUTROPHILS # BLD AUTO: 5.57 K/UL (ref 1.82–7.42)
NEUTROPHILS NFR BLD: 62.8 % (ref 44–72)
NRBC # BLD AUTO: 0 K/UL
NRBC BLD-RTO: 0 /100 WBC (ref 0–0.2)
PLATELET # BLD AUTO: 371 K/UL (ref 164–446)
PMV BLD AUTO: 9.6 FL (ref 9–12.9)
POTASSIUM SERPL-SCNC: 4.6 MMOL/L (ref 3.6–5.5)
PROT SERPL-MCNC: 6.9 G/DL (ref 6–8.2)
RBC # BLD AUTO: 3.97 M/UL (ref 4.2–5.4)
SODIUM SERPL-SCNC: 133 MMOL/L (ref 135–145)
WBC # BLD AUTO: 8.9 K/UL (ref 4.8–10.8)

## 2024-09-16 PROCEDURE — 80053 COMPREHEN METABOLIC PANEL: CPT

## 2024-09-16 PROCEDURE — 36415 COLL VENOUS BLD VENIPUNCTURE: CPT

## 2024-09-16 PROCEDURE — 86140 C-REACTIVE PROTEIN: CPT

## 2024-09-16 PROCEDURE — 85652 RBC SED RATE AUTOMATED: CPT

## 2024-09-16 PROCEDURE — 3074F SYST BP LT 130 MM HG: CPT | Performed by: STUDENT IN AN ORGANIZED HEALTH CARE EDUCATION/TRAINING PROGRAM

## 2024-09-16 PROCEDURE — 99214 OFFICE O/P EST MOD 30 MIN: CPT | Performed by: STUDENT IN AN ORGANIZED HEALTH CARE EDUCATION/TRAINING PROGRAM

## 2024-09-16 PROCEDURE — 85025 COMPLETE CBC W/AUTO DIFF WBC: CPT

## 2024-09-16 PROCEDURE — 3078F DIAST BP <80 MM HG: CPT | Performed by: STUDENT IN AN ORGANIZED HEALTH CARE EDUCATION/TRAINING PROGRAM

## 2024-09-16 PROCEDURE — 99212 OFFICE O/P EST SF 10 MIN: CPT | Performed by: STUDENT IN AN ORGANIZED HEALTH CARE EDUCATION/TRAINING PROGRAM

## 2024-09-16 RX ORDER — PREDNISONE 5 MG/1
TABLET ORAL
Qty: 126 TABLET | Refills: 0 | Status: SHIPPED | OUTPATIENT
Start: 2024-09-16

## 2024-09-16 ASSESSMENT — FIBROSIS 4 INDEX: FIB4 SCORE: 1.19

## 2024-09-16 NOTE — PATIENT INSTRUCTIONS
[facility]    Below are important guidelines to help you navigate your health care needs and assist us in caring for you safely and effectively. We encourage you to carefully read and understand this information and adhere to them accordingly.    [employer] Messaging and Phone Calls:  Diagnosis and Treatment - For a detailed explanation of your condition and treatment plan from today's visit, refer to the visit note on [employer] via the following steps:  Log in to [employer] and click on “Visits” at the top.  Scroll down to “Past Visits” under Appointments.  Click on “View Notes” under the appropriate visit date.  Questions or Concerns - MyChart messaging is for non-urgent matters that do not require immediate attention and should be brief with no more than two questions or concerns. If you have multiple questions or concerns, we ask that you schedule an appointment to have them properly addressed.  Response to Messages - [employer] messages are addressed throughout the week depending on clinical availability, so we ask that you allow up to one week for a response.  Phone Calls and Voicemails - Phone calls and voicemail messages are reserved for time-sensitive matters that cannot wait to be addressed via [employer]. We ask that you refrain from calling the office multiple times or leaving multiple voicemails regarding the same issue as doing so may lead to delays in response time.  Urgent Issues - For urgent medical matters or medical emergencies that cannot wait, you are advised to go to your nearest Urgent Care or Emergency Department for immediate attention.    Laboratory Tests and Imaging Studies:  Future Lab and Imaging Orders - We ask that you get your lab tests and imaging studies done no later than one week before your follow-up visit unless instructed otherwise.  Results Communication - You may see some test results marked as “abnormal” that are not necessarily significant or concerning. If  there are significant abnormalities on your test results that warrant further action, you will be notified via MyChart or phone call, otherwise they will be addressed at your follow-up visit.    Prescriptions and Refill Requests:  General Prescriptions (e.g. prednisone, hydroxychloroquine, leflunomide, methotrexate, etc.) - These are sent to Retail Pharmacies, so all refill requests of these medications should be directed to your local pharmacy.  Specialty Prescriptions (e.g. Enbrel, Humira, Cosentyx, Xeljanz, etc.) - These are sent to Specialty Pharmacies, so all refill requests of these medications should be directed to your designated specialty pharmacy.  Infusion Prescriptions (e.g. Remicade, Simponi Aria, Rituxan, Saphnelo, etc.) - These are sent to Outpatient Infusion Centers, so all scheduling requests of these medications should be directed to your local infusion center.    Medication Risks and Adverse Effects:  Immunosuppressed Status - Steroids and antirheumatic drugs are immunosuppressants, so they increase the risk of infections and can have side effects on various organ systems in your body, though most of them are uncommon.  Potential Side Effects - Be sure to read the drug package inserts to learn about the potential side effects of your medications before you start taking them and take them exactly as prescribed unless instructed otherwise.  In Case of Side Effects - If you experience any significant side effects, stop taking the medication immediately and promptly notify the prescriber. Depending on the severity of the side effects, consider going to an Urgent Care or Emergency Department for immediate attention.    Immunizations and Health Screening:  Vaccinations - If you are on immunosuppressive therapy, it is important that you are up to date on age-appropriate immunizations, particularly shingles and pneumonia vaccines, which you can request from your primary care provider or from us at your  next appointment.  Screening Tests - It is also important that you are up to date on age-appropriate screening tests, such as pap smear, mammography, and colonoscopy, which you can request from your primary care provider.    Educational and Supportive Resources:  [facility] ([email or web address]-[email or web address]) - Visit our website to learn more about your condition and other rheumatic diseases, and gain access to many helpful resources for them.  Disposal of Old Medications (www.[place].gov/everyday-takeback-day) - Visit the Drug Enforcement Administration website to find a nearby location where you can properly dispose of old medications you no longer need.  Disposal of Used [place] (www.safeneedledisposal.org) - Visit the Safe Needle Disposal Organization website to find a nearby location where you can properly dispose of used needles from your injectable medications.

## 2024-09-16 NOTE — PROGRESS NOTES
[facility] RHEUMATOLOGY  [address], [address], [place] [address]  Phone: [phone] ? Fax: [phone]  [facility]/Health-Services/Rheumatology    FOLLOW-UP VISIT NOTE      DATE OF SERVICE: 09/16/2024         Subjective     PRIMARY CARE PRACTITIONER:  YA Silvestre  [address] [address]    PATIENT IDENTIFICATION:  Angela Winchester  [address]  [place] [address]    YOB: 1945    MEDICAL RECORD NUMBER: [id number]          CHIEF COMPLAINT:   Chief Complaint   Patient presents with    Follow-Up     Seropositive rheumatoid arthritis (HCC)       RHEUMATOLOGIC HISTORY:  Angela Winchester is a 78 y.o. female with pertinent history notable for seropositive rheumatoid arthritis diagnosed in 2011 (symptomatic from 2009), osteoarthritis of multiple joints (hands, shoulders, knees, feet) s/p bilateral knee replacements, and DJD of cervical/thoracic spine. Previously under the care of a local rheumatologist who has retired (Dr. Conor Hull), she initially presented on 8/18/22 to establish care for continued management of her condition. Noted oral surgery in 5/2022 for which her DMARDs were held for almost a month prior, so had a flare for which Dr. Hull prescribed a 10-day course of prednisone 20 mg taper which was very helpful. Reported residual but tolerable joint/muscle pain in her hands, wrists, left shoulder, neck, lower back, knees, ankles and feet. These were associated with up to 1 hour of morning stiffness that improved with activity but her joint pain tended to worsen with much activity over the course of the day. Noted that she had tried Voltaren gel and had steroid injections to her left shoulder and both knees in the past which were not very helpful. She noted that orthopedic surgery recommended left shoulder replacement but she was not inclined to undergoing surgery.     Pertinent treatments: Prednisone 20>5 mg tapers (on/off, most recent in 7/2023,  effective), Remicade (discontinued after 3 infusions due to ineffectiveness), Humira (effective for 8 years, then became ineffective), methotrexate (stopped in 8/2022 due to hair loss and macrocytic anemia), Xeljanz 11 mg SR daily (2020-present, effective), sulfasalazine 500>1000 mg twice daily (started 8/2022, dose increased 7/2023-present, effective).     Pertinent positive labs: Positive anti- and  (in 7/2019).    Pertinent negative labs: Negative HCV (in 11/2018) and HBV (in 10/2019), QTB (in 7/2019), vitamin D (in 10/2023), vitamin B12, folate, CRP, ESR, eGFR, creatinine, LFTs, and acceptable CBC (in 4/2024).    Pertinent XR imaging: Hands and feet (in 6/2019) with moderate osteoarthritis but no evidence of inflammatory arthropathy. Shoulders (in 6/2019) with severe osteoarthritis of left GH joint, and mild bilateral osteoarthritis of AC joint.      INTERVAL HISTORY:  Reports interval history as noted on the questionnaire below or scanned under media tab.  Notably experienced a flareup of her RA from 9/1/24 with pain of her hands (with swelling), feet (with swelling), shoulders, and knees such that she is having to use her motorized scooter to ambulate.  Wonders if her current regimen of Xeljanz and sulfasalazine is becoming less effective, though she continues to smoke cigarettes heavily.    REVIEW OF SYSTEMS:  Except as noted in the history above, relevant review of systems with emphasis on autoimmune rheumatic diseases was otherwise negative.      ACTIVE PROBLEM LIST:  Patient Active Problem List    Diagnosis Date Noted    Immunosuppressed status (HCC) 04/20/2023    Multiple atypical skin moles 04/20/2023    Long-term use of immunosuppressant medication 09/15/2022    Seropositive rheumatoid arthritis (HCC) 09/15/2022    Osteoarthritis involving multiple joints on both sides of body 09/15/2022       PAST MEDICAL HISTORY:  Past Medical History:   Diagnosis Date    Arthritis     Breath shortness      with exertion    Bronchitis 01/2018    Cancer (HCC) 2009    LUNG CANCER= 8/13/2018 pt states she had lung resection and it was fungal infection, no cancer; Skin    Cataract     Emphysema of lung ([facility])     High cholesterol     Hypertension     Pain     knee/hand    Pneumonia 02/2018    RA (rheumatoid arthritis) ([facility])     Sleep apnea     CPAP with 2.5 liters oxygen    Snoring        PAST SURGICAL HISTORY:  Past Surgical History:   Procedure Laterality Date    KNEE ARTHROPLASTY TOTAL Right 8/27/2018    Procedure: KNEE ARTHROPLASTY TOTAL;  Surgeon: Fabian Kennedy M.D.;  Location: SURGERY [facility];  Service: Orthopedics    TIBIAL OSTEOTOMY Right 8/27/2018    Procedure: TIBIAL OSTEOTOMY - POSS TUBERCLE;  Surgeon: Fabian Kennedy M.D.;  Location: SURGERY [facility];  Service: Orthopedics    CATARACT PHACO WITH IOL  7/1/2013    Performed by Tyron Quiles M.D. at SURGERY [facility]    CATARACT PHACO WITH IOL  6/17/2013    Performed by Tyron Quiles M.D. at SURGERY [facility] ORS    CARPAL TUNNEL ENDOSCOPIC  2/5/2010    Performed by EREN KOVACS at SURGERY SAME DAY [facility] ORS    TUMOR EXCISION WITH BIOPSY Left 2010    left wrist for skin cancer, done in the office    THORACOSCOPY  6/8/2009    Performed by GANSER, JOHN H at SURGERY [facility] ORS       SOCIAL HISTORY:  Social History     Socioeconomic History    Marital status:      Spouse name: Not on file    Number of children: Not on file    Years of education: Not on file    Highest education level: Not on file   Occupational History    Not on file   Tobacco Use    Smoking status: Every Day     Current packs/day: 2.00     Average packs/day: 2.0 packs/day for 42.0 years (84.0 ttl pk-yrs)     Types: Cigarettes    Smokeless tobacco: Never    Tobacco comments:     2-3 ppd; 8/13/18 currently 0.5ppd   Vaping Use    Vaping status: Unknown   Substance and Sexual Activity    Alcohol use: No     Alcohol/week: 0.0 oz    Drug use: No     Sexual activity: Not on file   Other Topics Concern    Not on file   Social History Narrative    Not on file     Social Determinants of Health     Financial Resource Strain: Not on file   Food Insecurity: Not on file   Transportation Needs: Not on file   Physical Activity: Not on file   Stress: Not on file   Social Connections: Not on file   Intimate Partner Violence: Not on file   Housing Stability: Not on file     FAMILY HISTORY:  No family history on file.      MEDICATIONS:  Current Outpatient Medications   Medication Sig    predniSONE (DELTASONE) 5 MG Tab Take 4 tablets daily for 7 days, then decrease by 0.5 tablet every 7 days until finished. Take in the morning with food.    XELJANZ XR 11 MG TABLET SR 24 HR TAKE 1 TABLET DAILY    sulfaSALAzine (AZULFIDINE EN-TAB) 500 MG EC tablet TAKE 2 TABLETS BY MOUTH TWICE DAILY WITH MEALS    amLODIPine (NORVASC) 10 MG Tab amlodipine 10 mg tablet    cyclobenzaprine (FLEXERIL) 10 mg Tab cyclobenzaprine 10 mg tablet    fluticasone (FLONASE) 50 MCG/ACT nasal spray fluticasone propionate 50 mcg/actuation nasal spray,suspension    potassium chloride ER (KLOR-CON) 10 MEQ tablet Take 10 mEq by mouth every day.    furosemide (LASIX) 20 MG Tab TAKE 1/2 TO 1 TABLET BY MOUTH DAILY    metFORMIN (GLUCOPHAGE) 500 MG Tab Take 500 mg by mouth 2 times a day.    TRELEGY ELLIPTA 200-62.5-25 MCG/INH AEROSOL POWDER, BREATH ACTIVATED     albuterol 108 (90 Base) MCG/ACT Aero Soln inhalation aerosol Inhale 1-2 Puffs by mouth every four hours as needed for Shortness of Breath.    Cholecalciferol (VITAMIN D3) 5000 units Cap Take 1 Cap by mouth every day.    losartan (COZAAR) 100 MG Tab Take 100 mg by mouth every day.    metoprolol SR (TOPROL XL) 25 MG TABLET SR 24 HR Take 25 mg by mouth every day.    ALPRAZolam (XANAX) 0.5 MG Tab Take 0.5 mg by mouth at bedtime as needed for Sleep.    atorvastatin (LIPITOR) 20 MG Tab Take 20 mg by mouth every evening.    hydrocodone-acetaminophen (MAXIDONE)  " MG per tablet Take 1-2 Tabs by mouth every 6 hours as needed for Mild Pain.    benzonatate (TESSALON PERLES) 100 MG Cap Take 1 Capsule by mouth 3 times a day as needed for Cough.       ALLERGIES:   No Known Allergies    IMMUNIZATIONS:  Immunization History   Administered Date(s) Administered    Deepak SARS-CoV-2 Vaccine 03/21/2021, 11/03/2021            Objective     Vital Signs: /64 (BP Location: Left arm, Patient Position: Sitting, BP Cuff Size: Adult)   Pulse 71   Temp 36.4 °C (97.5 °F) (Temporal)   Ht 1.626 m (5' 4\")   Wt 63.7 kg (140 lb 8 oz)   SpO2 98% Body mass index is 24.12 kg/m².    General: Appears well and comfortable  Eyes: No scleral or conjunctival lesions  ENT: No apparent oral or nasal lesions  Head/Neck: No apparent scalp or neck lesions  Cardiovascular: Regular rate and rhythm  Respiratory: Breathing quiet and unlabored  Gastrointestinal: No apparent organomegaly or abdominal masses  Integumentary: Multiple hyperpigmented moles with skin tags on face and upper chest  Musculoskeletal: Moderate tenderness of the hands (mostly on the left 2nd-3rd MCP and PIP joints with synovitis and diffuse swelling) and feet (mostly on MTP squeeze with mild diffuse swelling); poorly localized mild tenderness of the shoulders and knees; overall range of motion relatively restricted by pain and discomfort  Neurologic: No focal sensory or motor deficits  Psychiatric: Mood and affect appropriate      LABORATORY RESULTS REVIEWED AND INTERPRETED BY ME:  Lab Results   Component Value Date/Time    CREACTPROT 0.46 04/23/2024 12:38 PM    SEDRATEWES 20 04/23/2024 12:38 PM    URICACID 6.3 03/20/2012 02:49 PM     Lab Results   Component Value Date/Time    RHEUMFACTN 107 (H) 07/02/2019 01:13 PM    CCPANTIBODY 174 (H) 07/02/2019 01:13 PM     Lab Results   Component Value Date/Time    ANTINUCAB None Detected 07/02/2019 01:13 PM     Lab Results   Component Value Date/Time    TSHULTRASEN 5.640 (H) 10/11/2023 " 10:26 AM    FREET4 1.24 03/20/2012 02:49 PM     Lab Results   Component Value Date/Time    25HYDROXY 60 10/11/2023 10:26 AM     Lab Results   Component Value Date/Time    FERRITIN 175.8 03/31/2016 01:04 PM    IRON 64 03/31/2016 01:04 PM    TRANSFERRIN 216 03/31/2016 01:05 PM    FOLATE >40.0 04/23/2024 12:38 PM    PROTHROMBTM 10.6 06/04/2009 04:50 PM    INR 0.91 06/04/2009 04:50 PM     Lab Results   Component Value Date/Time    WBC 9.5 04/23/2024 12:38 PM    RBC 4.22 04/23/2024 12:38 PM    HEMOGLOBIN 14.2 04/23/2024 12:38 PM    HEMATOCRIT 40.9 04/23/2024 12:38 PM    MCV 96.9 04/23/2024 12:38 PM    MCH 33.6 (H) 04/23/2024 12:38 PM    MCHC 34.7 04/23/2024 12:38 PM    RDW 48.4 04/23/2024 12:38 PM    PLATELETCT 281 04/23/2024 12:38 PM    MPV 9.2 04/23/2024 12:38 PM    NEUTS 6.79 04/23/2024 12:38 PM    LYMPHOCYTES 14.10 (L) 04/23/2024 12:38 PM    MONOCYTES 11.10 04/23/2024 12:38 PM    EOSINOPHILS 1.10 04/23/2024 12:38 PM    BASOPHILS 1.00 04/23/2024 12:38 PM    ANISOCYTOSIS 1+ 07/02/2019 01:13 PM     Lab Results   Component Value Date/Time    ASTSGOT 17 04/23/2024 12:38 PM    ALTSGPT 15 04/23/2024 12:38 PM    ALKPHOSPHAT 70 04/23/2024 12:38 PM    TBILIRUBIN 0.3 04/23/2024 12:38 PM    TOTPROTEIN 7.4 04/23/2024 12:38 PM    ALBUMIN 4.3 04/23/2024 12:38 PM     Lab Results   Component Value Date/Time    SODIUM 128 (L) 04/23/2024 12:38 PM    POTASSIUM 4.3 04/23/2024 12:38 PM    CHLORIDE 93 (L) 04/23/2024 12:38 PM    CO2 23 04/23/2024 12:38 PM    GLUCOSE 104 (H) 04/23/2024 12:38 PM    BUN 12 04/23/2024 12:38 PM    CREATININE 0.72 04/23/2024 12:38 PM    CALCIUM 9.5 04/23/2024 12:38 PM    MAGNESIUM 1.9 03/31/2016 01:04 PM     Lab Results   Component Value Date/Time    MICROALBUR 2.2 10/11/2023 10:26 AM    MALBCRT 248 (H) 10/11/2023 10:26 AM     Lab Results   Component Value Date/Time    COLORURINE DK Yellow 08/13/2018 01:40 PM    SPECGRAVITY 1.021 08/13/2018 01:40 PM    PHURINE 6.0 08/13/2018 01:40 PM    GLUCOSEUR Negative  08/13/2018 01:40 PM    KETONES Negative 08/13/2018 01:40 PM    PROTEINURIN 30 (A) 08/13/2018 01:40 PM     Lab Results   Component Value Date/Time    HEPBSAG Negative 07/02/2019 01:13 PM    HEPBCORIGM Positive (A) 07/02/2019 01:13 PM    HEPBCORTOT Negative 10/01/2019 01:48 PM    HEPCAB Negative 07/02/2019 01:13 PM     Lab Results   Component Value Date/Time    CHOLSTRLTOT 165 10/11/2023 10:26 AM    LDL 82 10/11/2023 10:26 AM    HDL 49 10/11/2023 10:26 AM    TRIGLYCERIDE 169 (H) 10/11/2023 10:26 AM    HBA1C 6.5 (H) 11/06/2020 03:19 PM       RADIOLOGY RESULTS REVIEWED AND INTERPRETED BY ME:  Results for orders placed during the hospital encounter of 11/20/06    DX-KNEES-AP BILATERAL STANDING    Impression  IMPRESSION:    MILD SYMMETRIC MEDIAL COMPARTMENT FEMOROTIBIAL OSTEOARTHROSIS.  NO  EVIDENCE OF ANY INFLAMMATORY  PROCESS.    Results for orders placed during the hospital encounter of 11/22/11    DX-FOOT-COMPLETE 3+    Impression  1. No no fracture or malalignment.    2. Chronic changes as described in the findings section.    Results for orders placed during the hospital encounter of 11/22/11    DX-ANKLE 3+ VIEWS    Impression  Negative right ankle series.  Diffuse soft tissue swelling.    Results for orders placed during the hospital encounter of 02/14/08    DX-KNEE COMPLETE 4+    Impression  IMPRESSION:    1. NO ACUTE FRACTURE IDENTIFIED.    2. SMALL KNEE JOINT EFFUSION.    3. PERIARTICULAR DEOSSIFICATION AND MILD OSTEOARTHROSIS.    Results for orders placed during the hospital encounter of 08/27/18    DX-KNEE 2- RIGHT    Impression  1. Status post right total knee replacement without evidence of hardware complication.    Results for orders placed during the hospital encounter of 11/20/06    DX-JOINT SURVEY-FEET SINGLE VIEW    Impression  IMPRESSION:    1. NO EVIDENCE OF INFLAMMATORY ARTHROPATHY.    2. BLAND DEGENERATIVE ARTHROSIS POLYARTICULAR.    Results for orders placed in visit on 08/01/17    DX-HAND 3+  RIGHT    Impression  Osteoarthritis affecting the interphalangeal joints with joint space narrowing, subchondral sclerosis and osteophyte formation.    No fracture or acute bony abnormality.    Results for orders placed during the hospital encounter of 06/03/19    DX-HAND 2- LEFT    Impression  1.  Moderate osteoarthritis.  2.  No evidence for inflammatory arthropathy.  3.  Evidence of old trauma to the distal radial metaphysis.    Results for orders placed during the hospital encounter of 11/20/06    DX-JOINT SURVEY-HANDS SINGLE VIEW    Impression  IMPRESSION:    BLAND DEGENERATIVE OSTEOARTHROSIS OF THE INTERPHALANGEAL JOINTS AND THUMB  BASE.  NO EVIDENCE OF INFLAMMATORY ARTHRITIS.    Results for orders placed during the hospital encounter of 06/03/19    DX-SHOULDER 2+ LEFT    Impression  1.  Severe degenerative change of LEFT shoulder with probable associated joint body.  2.  No fracture or dislocation.    Results for orders placed during the hospital encounter of 04/26/10    DS-BONE DENSITY STUDY (DEXA)    Impression  IMPRESSION:    ACCORDING TO THE WORLD HEALTH ORGANIZATION CLASSIFICATION, BONE MINERAL  DENSITY OF THIS PATIENT IS OSTEOPENIC.    Results for orders placed during the hospital encounter of 04/22/09    CT-CHEST (THORAX) W/O    Impression  IMPRESSION:    1. 11 MM NONCALCIFIED SPICULATED SOFT TISSUE MASS ANTERIORLY IN THE RIGHT  LUNG APEX (IMAGE 30 ).  THIS IS SUSPICIOUS FOR BRONCHOGENIC  CARCINOMA.  GIVEN ITS SIZE, IT WOULD LIKELY BE SENSITIVE TO FDG IMAGING  IF METABOLICALLY ACTIVE, AS A CANCER WOULD BE EXPECTED TO BE.    2. SMALL NONSPECIFIC MEDIASTINAL ADENOPATHY.    3. EVIDENCE OF PRIOR GRANULOMATOUS EXPOSURE.    Results for orders placed during the hospital encounter of 04/30/09    MR-KNEE-W/O    Impression  IMPRESSION:    1. MACERATED-TYPE TEAR SEEN INVOLVING THE POSTERIOR HORN AND BODY OF THE  LATERAL MENISCUS UNLESS THE PATIENT HAS HAD PRIOR PARTIAL MENISCECTOMY.    2. CHRONIC MILD  SPRAIN TO THE ANTERIOR AND POSTERIOR CRUCIATE LIGAMENTS.    3. TRICOMPARTMENT DEGENERATIVE CHANGE WHICH APPEARS TO INVOLVE THE  PATELLOFEMORAL AND THE LATERAL FEMOROTIBIAL ARTICULATIONS TO GREATEST  DEGREE.  SOME DEGENERATIVE CHANGE IS ALSO SEEN INVOLVING THE PROXIMAL  TIBIOFIBULAR ARTICULATION.    4. MODERATE-SIZED JOINT EFFUSION WITH EXTENSIVE SYNOVITIS.    5. LIKELY INTRAARTICULAR OSSIFIC LOOSE BODY SEEN ADJACENT TO THE PROXIMAL  TIBIA POSTERIORLY.    6. MULTIPLE LOW SIGNAL AREAS SEEN WITHIN A MODERATE-SIZED BAKER CYST  WHICH MAY REPRESENT EITHER SYNOVITIS OR EXTRUDED INTRAARTICULAR LOOSE  BODIES.      All relevant laboratory and imaging results reported on this note were reviewed and interpreted by me.         Assessment & Plan     Angela Winchester is a 78 y.o. female with history as noted above whose presentation merits the following clinical impressions and recommendations:    1. Seropositive rheumatoid arthritis (HCC)  Moderately active disease flare with episode likely due to her continued long-term heavy cigarette smoking, so not really a reflection of ineffectiveness of her current regimen of Xeljanz and sulfasalazine. Reasonable to treat with a course of prednisone taper, but if she continues to experience flares, would then consider switching to Rinvoq.  - CRP and ESR (previously ordered to be done before starting prednisone)  - CRP QUANTITIVE (NON-CARDIAC); Future  - Sed Rate; Future  - predniSONE (DELTASONE) 5 MG Tab; Take 4 tablets daily for 7 days, then decrease by 0.5 tablet every 7 days until finished. Take in the morning with food.  Dispense: 126 Tablet; Refill: 0  - Continue Xeljanz 11 mg SR daily  - Continue sulfasalazine 1000 mg twice daily  - Consider switching to Rinvoq depending on her clinical trajectory    2. Osteoarthritis involving multiple joints on both sides of body  Presumably a significant etiology of her overall joint pain which can be managed supportively. She is not inclined to  intra-articular steroid injection due to apparent bad experience in the past.  - Tylenol Arthritis and Voltaren 1% gel with or without oral NSAIDs as needed    3. Multiple atypical skin moles  Active problem that was previously evaluated by dermatology and underwent skin biopsy which reportedly showed no evidence of malignancy.  - Recommend routine follow-up with dermatology    4. Immunosuppressed status (HCC)  Secondary to immunosuppressive therapy, but presently with no history, physical, or laboratory evidence to suggest significant adverse drug effects or opportunistic infections, but need routine monitoring per guidelines.  - CBC and CMP (previously ordered)  - CBC WITH DIFFERENTIAL; Future  - Comp Metabolic Panel; Future  - Need to ascertain age-appropriate vaccines in addition to the ones listed above under immunizations      The above assessment and plan were discussed with the patient who acknowledged understanding of the plan.    FOLLOW-UP: Return in about 3 months (around 12/16/2024) for Short.         Thank you for the opportunity to participate in the care of Angela Winchester.    Basil Ren MD, MS, FACR  Rheumatologist, [facility] Rheumatology, [facility]   of Clinical Medicine, Department of Internal Medicine  [facility] [employer] [facility]

## 2024-09-18 ENCOUNTER — APPOINTMENT (RX ONLY)
Dept: URBAN - METROPOLITAN AREA CLINIC 6 | Facility: CLINIC | Age: 79
Setting detail: DERMATOLOGY
End: 2024-09-18

## 2024-09-18 DIAGNOSIS — D69.2 OTHER NONTHROMBOCYTOPENIC PURPURA: ICD-10-CM

## 2024-09-18 DIAGNOSIS — L82.1 OTHER SEBORRHEIC KERATOSIS: ICD-10-CM

## 2024-09-18 PROCEDURE — ? ADDITIONAL NOTES

## 2024-09-18 PROCEDURE — ? COUNSELING

## 2024-09-18 PROCEDURE — 99213 OFFICE O/P EST LOW 20 MIN: CPT

## 2024-09-18 ASSESSMENT — LOCATION SIMPLE DESCRIPTION DERM
LOCATION SIMPLE: RIGHT FOREARM
LOCATION SIMPLE: LEFT FOREARM
LOCATION SIMPLE: RIGHT SHOULDER

## 2024-09-18 ASSESSMENT — LOCATION ZONE DERM: LOCATION ZONE: ARM

## 2024-09-18 ASSESSMENT — LOCATION DETAILED DESCRIPTION DERM
LOCATION DETAILED: LEFT DISTAL DORSAL FOREARM
LOCATION DETAILED: RIGHT DISTAL DORSAL FOREARM
LOCATION DETAILED: RIGHT ANTERIOR SHOULDER

## 2024-09-18 NOTE — PROCEDURE: ADDITIONAL NOTES
Detail Level: Detailed
Render Risk Assessment In Note?: no
Additional Notes: One lesion treated with cryotherapy as a courtesy.
Additional Notes: Recommended increased consumption of fresh pineapple for bromelain supplementation. Recommended daily application of Dermend cream and increase collagen supplements.

## 2024-09-25 NOTE — PROCEDURE: MIPS QUALITY
Quality 130: Documentation Of Current Medications In The Medical Record: Current Medications Documented
Quality 226: Preventive Care And Screening: Tobacco Use: Screening And Cessation Intervention: Patient screened for tobacco use and is an ex/non-smoker
Detail Level: Detailed
Strong peripheral pulses/Capillary refill less/equal to 2 seconds

## 2024-12-02 ENCOUNTER — HOSPITAL ENCOUNTER (OUTPATIENT)
Dept: LAB | Facility: MEDICAL CENTER | Age: 79
End: 2024-12-02
Attending: NURSE PRACTITIONER
Payer: MEDICARE

## 2024-12-02 LAB
ALBUMIN SERPL BCP-MCNC: 3.8 G/DL (ref 3.2–4.9)
ALBUMIN/GLOB SERPL: 1.1 G/DL
ALP SERPL-CCNC: 80 U/L (ref 30–99)
ALT SERPL-CCNC: 8 U/L (ref 2–50)
ANION GAP SERPL CALC-SCNC: 14 MMOL/L (ref 7–16)
AST SERPL-CCNC: 16 U/L (ref 12–45)
BILIRUB SERPL-MCNC: 0.3 MG/DL (ref 0.1–1.5)
BUN SERPL-MCNC: 10 MG/DL (ref 8–22)
CALCIUM ALBUM COR SERPL-MCNC: 9.4 MG/DL (ref 8.5–10.5)
CALCIUM SERPL-MCNC: 9.2 MG/DL (ref 8.5–10.5)
CHLORIDE SERPL-SCNC: 97 MMOL/L (ref 96–112)
CHOLEST SERPL-MCNC: 173 MG/DL (ref 100–199)
CO2 SERPL-SCNC: 22 MMOL/L (ref 20–33)
CREAT SERPL-MCNC: 0.59 MG/DL (ref 0.5–1.4)
EST. AVERAGE GLUCOSE BLD GHB EST-MCNC: 111 MG/DL
GFR SERPLBLD CREATININE-BSD FMLA CKD-EPI: 93 ML/MIN/1.73 M 2
GLOBULIN SER CALC-MCNC: 3.5 G/DL (ref 1.9–3.5)
GLUCOSE SERPL-MCNC: 88 MG/DL (ref 65–99)
HBA1C MFR BLD: 5.5 % (ref 4–5.6)
HDLC SERPL-MCNC: 58 MG/DL
LDLC SERPL CALC-MCNC: 98 MG/DL
POTASSIUM SERPL-SCNC: 4 MMOL/L (ref 3.6–5.5)
PROT SERPL-MCNC: 7.3 G/DL (ref 6–8.2)
SODIUM SERPL-SCNC: 133 MMOL/L (ref 135–145)
TRIGL SERPL-MCNC: 84 MG/DL (ref 0–149)

## 2024-12-02 PROCEDURE — 82570 ASSAY OF URINE CREATININE: CPT

## 2024-12-02 PROCEDURE — 80061 LIPID PANEL: CPT

## 2024-12-02 PROCEDURE — 82043 UR ALBUMIN QUANTITATIVE: CPT

## 2024-12-02 PROCEDURE — 36415 COLL VENOUS BLD VENIPUNCTURE: CPT | Mod: GA

## 2024-12-02 PROCEDURE — 80053 COMPREHEN METABOLIC PANEL: CPT

## 2024-12-02 PROCEDURE — 83036 HEMOGLOBIN GLYCOSYLATED A1C: CPT | Mod: GA

## 2024-12-03 LAB
CREAT UR-MCNC: 7.32 MG/DL
MICROALBUMIN UR-MCNC: 9.3 MG/DL
MICROALBUMIN/CREAT UR: 1270 MG/G (ref 0–30)

## 2025-01-08 ENCOUNTER — HOSPITAL ENCOUNTER (OUTPATIENT)
Dept: LAB | Facility: MEDICAL CENTER | Age: 80
End: 2025-01-08
Attending: SPECIALIST
Payer: MEDICARE

## 2025-01-08 DIAGNOSIS — M05.9 SEROPOSITIVE RHEUMATOID ARTHRITIS (HCC): ICD-10-CM

## 2025-01-08 DIAGNOSIS — D84.9 IMMUNOSUPPRESSED STATUS (HCC): ICD-10-CM

## 2025-01-08 LAB
BASOPHILS # BLD AUTO: 0.6 % (ref 0–1.8)
BASOPHILS # BLD: 0.04 K/UL (ref 0–0.12)
EOSINOPHIL # BLD AUTO: 0.08 K/UL (ref 0–0.51)
EOSINOPHIL NFR BLD: 1.3 % (ref 0–6.9)
ERYTHROCYTE [DISTWIDTH] IN BLOOD BY AUTOMATED COUNT: 51.4 FL (ref 35.9–50)
ERYTHROCYTE [SEDIMENTATION RATE] IN BLOOD BY WESTERGREN METHOD: 45 MM/HOUR (ref 0–25)
HCT VFR BLD AUTO: 39.4 % (ref 37–47)
HGB BLD-MCNC: 13.2 G/DL (ref 12–16)
IMM GRANULOCYTES # BLD AUTO: 0.08 K/UL (ref 0–0.11)
IMM GRANULOCYTES NFR BLD AUTO: 1.3 % (ref 0–0.9)
LYMPHOCYTES # BLD AUTO: 1.43 K/UL (ref 1–4.8)
LYMPHOCYTES NFR BLD: 22.9 % (ref 22–41)
MCH RBC QN AUTO: 31.4 PG (ref 27–33)
MCHC RBC AUTO-ENTMCNC: 33.5 G/DL (ref 32.2–35.5)
MCV RBC AUTO: 93.8 FL (ref 81.4–97.8)
MONOCYTES # BLD AUTO: 0.73 K/UL (ref 0–0.85)
MONOCYTES NFR BLD AUTO: 11.7 % (ref 0–13.4)
NEUTROPHILS # BLD AUTO: 3.89 K/UL (ref 1.82–7.42)
NEUTROPHILS NFR BLD: 62.2 % (ref 44–72)
NRBC # BLD AUTO: 0 K/UL
NRBC BLD-RTO: 0 /100 WBC (ref 0–0.2)
PLATELET # BLD AUTO: 269 K/UL (ref 164–446)
PMV BLD AUTO: 8.5 FL (ref 9–12.9)
RBC # BLD AUTO: 4.2 M/UL (ref 4.2–5.4)
WBC # BLD AUTO: 6.3 K/UL (ref 4.8–10.8)

## 2025-01-08 PROCEDURE — 85025 COMPLETE CBC W/AUTO DIFF WBC: CPT

## 2025-01-08 PROCEDURE — 80053 COMPREHEN METABOLIC PANEL: CPT

## 2025-01-08 PROCEDURE — 86140 C-REACTIVE PROTEIN: CPT

## 2025-01-08 PROCEDURE — 36415 COLL VENOUS BLD VENIPUNCTURE: CPT

## 2025-01-08 PROCEDURE — 85652 RBC SED RATE AUTOMATED: CPT

## 2025-01-09 LAB
ALBUMIN SERPL BCP-MCNC: 4 G/DL (ref 3.2–4.9)
ALBUMIN/GLOB SERPL: 1.1 G/DL
ALP SERPL-CCNC: 71 U/L (ref 30–99)
ALT SERPL-CCNC: 13 U/L (ref 2–50)
ANION GAP SERPL CALC-SCNC: 13 MMOL/L (ref 7–16)
AST SERPL-CCNC: 9 U/L (ref 12–45)
BILIRUB SERPL-MCNC: 0.3 MG/DL (ref 0.1–1.5)
BUN SERPL-MCNC: 7 MG/DL (ref 8–22)
CALCIUM ALBUM COR SERPL-MCNC: 8.9 MG/DL (ref 8.5–10.5)
CALCIUM SERPL-MCNC: 8.9 MG/DL (ref 8.5–10.5)
CHLORIDE SERPL-SCNC: 93 MMOL/L (ref 96–112)
CO2 SERPL-SCNC: 24 MMOL/L (ref 20–33)
CREAT SERPL-MCNC: 0.65 MG/DL (ref 0.5–1.4)
CRP SERPL HS-MCNC: 3.06 MG/DL (ref 0–0.75)
GFR SERPLBLD CREATININE-BSD FMLA CKD-EPI: 90 ML/MIN/1.73 M 2
GLOBULIN SER CALC-MCNC: 3.5 G/DL (ref 1.9–3.5)
GLUCOSE SERPL-MCNC: 86 MG/DL (ref 65–99)
POTASSIUM SERPL-SCNC: 4.1 MMOL/L (ref 3.6–5.5)
PROT SERPL-MCNC: 7.5 G/DL (ref 6–8.2)
SODIUM SERPL-SCNC: 130 MMOL/L (ref 135–145)

## 2025-01-13 ENCOUNTER — OFFICE VISIT (OUTPATIENT)
Dept: RHEUMATOLOGY | Facility: MEDICAL CENTER | Age: 80
End: 2025-01-13
Attending: STUDENT IN AN ORGANIZED HEALTH CARE EDUCATION/TRAINING PROGRAM
Payer: MEDICARE

## 2025-01-13 VITALS
DIASTOLIC BLOOD PRESSURE: 60 MMHG | BODY MASS INDEX: 24.33 KG/M2 | HEIGHT: 64 IN | HEART RATE: 71 BPM | OXYGEN SATURATION: 95 % | TEMPERATURE: 97.6 F | WEIGHT: 142.5 LBS | SYSTOLIC BLOOD PRESSURE: 136 MMHG

## 2025-01-13 DIAGNOSIS — M15.9 OSTEOARTHRITIS INVOLVING MULTIPLE JOINTS ON BOTH SIDES OF BODY: ICD-10-CM

## 2025-01-13 DIAGNOSIS — D84.9 IMMUNOSUPPRESSED STATUS (HCC): ICD-10-CM

## 2025-01-13 DIAGNOSIS — M05.9 SEROPOSITIVE RHEUMATOID ARTHRITIS (HCC): ICD-10-CM

## 2025-01-13 DIAGNOSIS — D22.9 MULTIPLE ATYPICAL SKIN MOLES: ICD-10-CM

## 2025-01-13 PROCEDURE — 3078F DIAST BP <80 MM HG: CPT | Performed by: STUDENT IN AN ORGANIZED HEALTH CARE EDUCATION/TRAINING PROGRAM

## 2025-01-13 PROCEDURE — 3075F SYST BP GE 130 - 139MM HG: CPT | Performed by: STUDENT IN AN ORGANIZED HEALTH CARE EDUCATION/TRAINING PROGRAM

## 2025-01-13 PROCEDURE — 99212 OFFICE O/P EST SF 10 MIN: CPT | Performed by: STUDENT IN AN ORGANIZED HEALTH CARE EDUCATION/TRAINING PROGRAM

## 2025-01-13 PROCEDURE — 99214 OFFICE O/P EST MOD 30 MIN: CPT | Performed by: STUDENT IN AN ORGANIZED HEALTH CARE EDUCATION/TRAINING PROGRAM

## 2025-01-13 RX ORDER — PREDNISONE 10 MG/1
TABLET ORAL
Qty: 74 TABLET | Refills: 0 | Status: SHIPPED | OUTPATIENT
Start: 2025-01-13

## 2025-01-13 RX ORDER — UPADACITINIB 15 MG/1
15 TABLET, EXTENDED RELEASE ORAL DAILY
Qty: 30 TABLET | Refills: 5 | Status: SHIPPED | OUTPATIENT
Start: 2025-01-13

## 2025-01-13 ASSESSMENT — FIBROSIS 4 INDEX: FIB4 SCORE: 0.71

## 2025-01-13 ASSESSMENT — PATIENT HEALTH QUESTIONNAIRE - PHQ9: CLINICAL INTERPRETATION OF PHQ2 SCORE: 0

## 2025-01-13 NOTE — PATIENT INSTRUCTIONS
[facility]    Below are important guidelines to help you navigate your health care needs and assist us in caring for you safely and effectively. We encourage you to carefully read and understand this information and adhere to them accordingly.    ActionBase Messaging and Phone Calls:  Diagnosis and Treatment - For a detailed explanation of your condition and treatment plan from today's visit, refer to the visit note on ActionBase via the following steps:  Log in to ActionBase and click on “Visits” at the top.  Scroll down to “Past Visits” under Appointments.  Click on “View Notes” under the appropriate visit date.  Questions or Concerns - MyChart messaging is for non-urgent matters that do not require immediate attention and should be brief with no more than two questions or concerns. If you have multiple questions or concerns, we ask that you schedule an appointment to have them properly addressed.  Response to Messages - ActionBase messages are addressed throughout the week depending on clinical availability, so we ask that you allow up to one week for a response.  Phone Calls and Voicemails - Phone calls and voicemail messages are reserved for time-sensitive matters that cannot wait to be addressed via [facility]. We ask that you refrain from calling the office multiple times or leaving multiple voicemails regarding the same issue as doing so may lead to delays in response time.  Urgent Issues - For urgent medical matters or medical emergencies that cannot wait, you are advised to go to your nearest Urgent Care or Emergency Department for immediate attention.    Laboratory Tests and Imaging Studies:  Future Lab and Imaging Orders - We ask that you get your lab tests and imaging studies done no later than one week before your follow-up visit unless instructed otherwise.  Results Communication - You may see some test results marked as “abnormal” that are not necessarily significant or concerning. If  there are significant abnormalities on your test results that warrant further action, you will be notified via MyChart or phone call, otherwise they will be addressed at your follow-up visit.    Prescriptions and Refill Requests:  General Prescriptions (e.g. prednisone, hydroxychloroquine, leflunomide, methotrexate, etc.) - These are sent to Retail Pharmacies, so all refill requests of these medications should be directed to your local pharmacy.  Specialty Prescriptions (e.g. Enbrel, Humira, Cosentyx, Xeljanz, etc.) - These are sent to Specialty Pharmacies, so all refill requests of these medications should be directed to your designated specialty pharmacy.  Infusion Prescriptions (e.g. Remicade, Simponi Aria, Rituxan, Saphnelo, etc.) - These are sent to Outpatient Infusion Centers, so all scheduling requests of these medications should be directed to your local infusion center.    Medication Risks and Adverse Effects:  Immunosuppressed Status - Steroids and antirheumatic drugs are immunosuppressants, so they increase the risk of infections and can have side effects on various organ systems in your body, though most of them are uncommon.  Potential Side Effects - Be sure to read the drug package inserts to learn about the potential side effects of your medications before you start taking them and take them exactly as prescribed unless instructed otherwise.  In Case of Side Effects - If you experience any significant side effects, stop taking the medication immediately and promptly notify the prescriber. Depending on the severity of the side effects, consider going to an Urgent Care or Emergency Department for immediate attention.    Immunizations and Health Screening:  Vaccinations - If you are on immunosuppressive therapy, it is important that you are up to date on age-appropriate immunizations, particularly shingles and pneumonia vaccines, which you can request from your primary care provider or from us at your  next appointment.  Screening Tests - It is also important that you are up to date on age-appropriate screening tests, such as pap smear, mammography, and colonoscopy, which you can request from your primary care provider.    Educational and Supportive Resources:  [facility] ([email or web address]) - Visit our website to learn more about your condition and other rheumatic diseases, and gain access to many helpful resources for them.  Disposal of Old Medications (www.[place].gov/everyday-takeback-day) - Visit the Drug Enforcement Administration website to find a nearby location where you can properly dispose of old medications you no longer need.  Disposal of Used [place] (www.safeneedledisposal.org) - Visit the Safe Needle Disposal Organization website to find a nearby location where you can properly dispose of used needles from your injectable medications.

## 2025-01-13 NOTE — PROGRESS NOTES
[facility] RHEUMATOLOGY  [address], [address], [place] [address]  Phone: [phone] ? Fax: [phone]  [facility]/Health-Services/Rheumatology    FOLLOW-UP VISIT NOTE         Subjective     DATE OF SERVICE: 01/13/2025    PRIMARY CARE PROVIDER:  YA Silvestre  [address] [place] [address]    PATIENT IDENTIFICATION:  Angela Winchester  [address]  [place] [address]    YOB: 1945    MEDICAL RECORD NUMBER: [id number]         CHIEF COMPLAINT:   Chief Complaint   Patient presents with    Follow-Up     Seropositive rheumatoid arthritis (HCC)       RHEUMATOLOGIC HISTORY:  Angela Winchester is a 79 y.o. female with pertinent history notable for seropositive rheumatoid arthritis diagnosed in 2011 (symptomatic from 2009), osteoarthritis of multiple joints (hands, shoulders, knees, feet) s/p bilateral knee replacements, and DJD of cervical/thoracic spine. Previously under the care of a local rheumatologist who has retired (Dr. Conor Hull), she initially presented on 8/18/22 to establish care for continued management of her condition. Noted oral surgery in 5/2022 for which her DMARDs were held for almost a month prior, so had a flare for which Dr. Hull prescribed a 10-day course of prednisone 20 mg taper which was very helpful. Reported residual but tolerable joint/muscle pain in her hands, wrists, left shoulder, neck, lower back, knees, ankles and feet. These were associated with up to 1 hour of morning stiffness that improved with activity but her joint pain tended to worsen with much activity over the course of the day. Noted that she had tried Voltaren gel and had steroid injections to her left shoulder and both knees in the past which were not very helpful. She noted that orthopedic surgery recommended left shoulder replacement but she was not inclined to undergoing surgery.     Pertinent treatments: Prednisone 20>5 mg tapers (on/off since 2011, effective),  Remicade (discontinued after 3 infusions due to ineffectiveness), Humira (effective for 8 years, then became ineffective), methotrexate (stopped in 8/2022 due to hair loss and macrocytic anemia), Xeljanz 11 mg SR daily (2020-present, effective), sulfasalazine 500>1000 mg twice daily (started 8/2022, dose increased 7/2023-present, effective).     Pertinent positive labs: Positive anti- and  (in 7/2019); CRP 3.06<2.86 and ESR 45<92 (in 1/2025<9/2024).     Pertinent negative labs: Negative HCV (in 11/2018) and HBV (in 10/2019), QTB (in 7/2019), vitamin D (in 10/2023), vitamin B12, and folate (in 4/2024), eGFR, creatinine, acceptable LFTs and CBC (in 1/2025).     Pertinent XR imaging: Hands and feet (in 6/2019) with moderate osteoarthritis but no evidence of inflammatory arthropathy. Shoulders (in 6/2019) with severe osteoarthritis of left GH joint, and mild bilateral osteoarthritis of AC joint.       INTERVAL HISTORY:  Reports interval history as noted on the questionnaire below or scanned under media tab.  Noted months-long worsening pain/swelling/stiffness in the hands, shoulders, neck, knees, and feet.  Feels that Xeljanz is no longer effective and would like to try a different medication at this point.    REVIEW OF SYSTEMS:  Except as noted in the history above, relevant review of systems with emphasis on autoimmune rheumatic diseases was otherwise negative.      CURRENT PROBLEM LIST:  Patient Active Problem List    Diagnosis Date Noted    Immunosuppressed status ([facility]) 04/20/2023    Multiple atypical skin moles 04/20/2023    Long-term use of immunosuppressant medication 09/15/2022    Seropositive rheumatoid arthritis (HCC) 09/15/2022    Osteoarthritis involving multiple joints on both sides of body 09/15/2022       PAST MEDICAL HISTORY:  Past Medical History:   Diagnosis Date    Arthritis     Breath shortness     with exertion    Bronchitis 01/2018    Cancer ([facility]) 2009    LUNG CANCER= 8/13/2018 pt  states she had lung resection and it was fungal infection, no cancer; Skin    Cataract     Emphysema of lung (HCC)     High cholesterol     Hypertension     Pain     knee/hand    Pneumonia 02/2018    RA (rheumatoid arthritis) (HCC)     Sleep apnea     CPAP with 2.5 liters oxygen    Snoring        PAST SURGICAL HISTORY:  Past Surgical History:   Procedure Laterality Date    KNEE ARTHROPLASTY TOTAL Right 8/27/2018    Procedure: KNEE ARTHROPLASTY TOTAL;  Surgeon: Fabian Kennedy M.D.;  Location: SURGERY [facility];  Service: Orthopedics    TIBIAL OSTEOTOMY Right 8/27/2018    Procedure: TIBIAL OSTEOTOMY - POSS TUBERCLE;  Surgeon: Fabian Kennedy M.D.;  Location: SURGERY [facility];  Service: Orthopedics    CATARACT PHACO WITH IOL  7/1/2013    Performed by Tyron Quiles M.D. at SURGERY [facility]    CATARACT PHACO WITH IOL  6/17/2013    Performed by Tyron Quiles M.D. at SURGERY [facility]    CARPAL TUNNEL ENDOSCOPIC  2/5/2010    Performed by EREN KOVACS at SURGERY [facility] ORS    TUMOR EXCISION WITH BIOPSY Left 2010    left wrist for skin cancer, done in the office    THORACOSCOPY  6/8/2009    Performed by GANSER, JOHN H at SURGERY [facility]       SOCIAL HISTORY:  Social History     Socioeconomic History    Marital status:      Spouse name: Not on file    Number of children: Not on file    Years of education: Not on file    Highest education level: Not on file   Occupational History    Not on file   Tobacco Use    Smoking status: Every Day     Current packs/day: 2.00     Average packs/day: 2.0 packs/day for 42.0 years (84.0 ttl pk-yrs)     Types: Cigarettes    Smokeless tobacco: Never    Tobacco comments:     2-3 ppd; 8/13/18 currently 0.5ppd   Vaping Use    Vaping status: Unknown   Substance and Sexual Activity    Alcohol use: No     Alcohol/week: 0.0 oz    Drug use: No    Sexual activity: Not on file   Other Topics Concern    Not on file   Social History  Narrative    Not on file     Social Drivers of Health     Financial Resource Strain: Not on file   Food Insecurity: Not on file   Transportation Needs: Not on file   Physical Activity: Not on file   Stress: Not on file   Social Connections: Not on file   Intimate Partner Violence: Not on file   Housing Stability: Not on file       FAMILY HISTORY:  History reviewed. No pertinent family history.    MEDICATIONS:  Current Outpatient Medications   Medication Sig    Upadacitinib ER (RINVOQ) 15 MG TABLET SR 24 HR Take 15 mg by mouth every day.    predniSONE (DELTASONE) 10 MG Tab Take 3 tablets every day for 7 days, then decrease by 0.5 tablet every 7 days until finished. Take in the morning with food.    sulfaSALAzine (AZULFIDINE EN-TAB) 500 MG EC tablet TAKE 2 TABLETS BY MOUTH TWICE DAILY WITH MEALS    benzonatate (TESSALON PERLES) 100 MG Cap Take 1 Capsule by mouth 3 times a day as needed for Cough.    amLODIPine (NORVASC) 10 MG Tab amlodipine 10 mg tablet    cyclobenzaprine (FLEXERIL) 10 mg Tab cyclobenzaprine 10 mg tablet    fluticasone (FLONASE) 50 MCG/ACT nasal spray fluticasone propionate 50 mcg/actuation nasal spray,suspension    potassium chloride ER (KLOR-CON) 10 MEQ tablet Take 10 mEq by mouth every day.    furosemide (LASIX) 20 MG Tab TAKE 1/2 TO 1 TABLET BY MOUTH DAILY    metFORMIN (GLUCOPHAGE) 500 MG Tab Take 500 mg by mouth 2 times a day.    TRELEGY ELLIPTA 200-62.5-25 MCG/INH AEROSOL POWDER, BREATH ACTIVATED     albuterol 108 (90 Base) MCG/ACT Aero Soln inhalation aerosol Inhale 1-2 Puffs by mouth every four hours as needed for Shortness of Breath.    Cholecalciferol (VITAMIN D3) 5000 units Cap Take 1 Cap by mouth every day.    losartan (COZAAR) 100 MG Tab Take 100 mg by mouth every day.    metoprolol SR (TOPROL XL) 25 MG TABLET SR 24 HR Take 25 mg by mouth every day.    ALPRAZolam (XANAX) 0.5 MG Tab Take 0.5 mg by mouth at bedtime as needed for Sleep.    atorvastatin (LIPITOR) 20 MG Tab Take 20 mg by mouth  "every evening.    hydrocodone-acetaminophen (MAXIDONE)  MG per tablet Take 1-2 Tabs by mouth every 6 hours as needed for Mild Pain.       ALLERGIES:   No Known Allergies    IMMUNIZATIONS:  Immunization History   Administered Date(s) Administered    Deepak SARS-CoV-2 Vaccine 03/21/2021, 11/03/2021            Objective     Vital Signs: /60 (BP Location: Left arm, Patient Position: Sitting, BP Cuff Size: Adult)   Pulse 71   Temp 36.4 °C (97.6 °F) (Temporal)   Ht 1.626 m (5' 4\")   Wt 64.6 kg (142 lb 8 oz)   SpO2 95% Body mass index is 24.46 kg/m².    General: Appears well and comfortable  Eyes: No scleral or conjunctival lesions  ENT: No apparent oral, nasal, or ear lesions  Head/Neck: No apparent scalp or neck lesions  Cardiovascular: Regular rate and rhythm  Respiratory: Breathing quiet and unlabored  Gastrointestinal: No apparent organomegaly or abdominal masses  Integumentary: Multiple hyperpigmented moles with skin tags on face and upper chest  Musculoskeletal: Moderate tenderness of the hands (mostly on the left 2nd-3rd MCP and PIP joints with synovitis and diffuse swelling) and feet (mostly on MTP squeeze with mild diffuse swelling); poorly localized mild tenderness of the shoulders and knees; overall range of motion relatively restricted by pain and discomfort  Neurologic: No focal sensory or motor deficits  Psychiatric: Mood and affect appropriate      LABORATORY RESULTS REVIEWED AND INTERPRETED:  Lab Results   Component Value Date/Time    CREACTPROT 3.06 (H) 01/08/2025 12:38 PM    SEDRATEWES 45 (H) 01/08/2025 12:38 PM    URICACID 6.3 03/20/2012 02:49 PM     Lab Results   Component Value Date/Time    RHEUMFACTN 107 (H) 07/02/2019 01:13 PM    CCPANTIBODY 174 (H) 07/02/2019 01:13 PM     Lab Results   Component Value Date/Time    ANTINUCAB None Detected 07/02/2019 01:13 PM     Lab Results   Component Value Date/Time    TSHULTRASEN 5.640 (H) 10/11/2023 10:26 AM    FREET4 1.24 03/20/2012 02:49 PM "     Lab Results   Component Value Date/Time    25HYDROXY 60 10/11/2023 10:26 AM     Lab Results   Component Value Date/Time    FERRITIN 175.8 03/31/2016 01:04 PM    IRON 64 03/31/2016 01:04 PM    TRANSFERRIN 216 03/31/2016 01:05 PM    FOLATE >40.0 04/23/2024 12:38 PM    PROTHROMBTM 10.6 06/04/2009 04:50 PM    INR 0.91 06/04/2009 04:50 PM     Lab Results   Component Value Date/Time    WBC 6.3 01/08/2025 12:38 PM    RBC 4.20 01/08/2025 12:38 PM    HEMOGLOBIN 13.2 01/08/2025 12:38 PM    HEMATOCRIT 39.4 01/08/2025 12:38 PM    MCV 93.8 01/08/2025 12:38 PM    MCH 31.4 01/08/2025 12:38 PM    MCHC 33.5 01/08/2025 12:38 PM    RDW 51.4 (H) 01/08/2025 12:38 PM    PLATELETCT 269 01/08/2025 12:38 PM    MPV 8.5 (L) 01/08/2025 12:38 PM    NEUTS 3.89 01/08/2025 12:38 PM    LYMPHOCYTES 22.90 01/08/2025 12:38 PM    MONOCYTES 11.70 01/08/2025 12:38 PM    EOSINOPHILS 1.30 01/08/2025 12:38 PM    BASOPHILS 0.60 01/08/2025 12:38 PM    ANISOCYTOSIS 1+ 07/02/2019 01:13 PM     Lab Results   Component Value Date/Time    ASTSGOT 9 (L) 01/08/2025 12:38 PM    ALTSGPT 13 01/08/2025 12:38 PM    ALKPHOSPHAT 71 01/08/2025 12:38 PM    TBILIRUBIN 0.3 01/08/2025 12:38 PM    TOTPROTEIN 7.5 01/08/2025 12:38 PM    ALBUMIN 4.0 01/08/2025 12:38 PM     Lab Results   Component Value Date/Time    SODIUM 130 (L) 01/08/2025 12:38 PM    POTASSIUM 4.1 01/08/2025 12:38 PM    CHLORIDE 93 (L) 01/08/2025 12:38 PM    CO2 24 01/08/2025 12:38 PM    GLUCOSE 86 01/08/2025 12:38 PM    BUN 7 (L) 01/08/2025 12:38 PM    CREATININE 0.65 01/08/2025 12:38 PM    CALCIUM 8.9 01/08/2025 12:38 PM    MAGNESIUM 1.9 03/31/2016 01:04 PM     Lab Results   Component Value Date/Time    MICROALBUR 9.3 12/02/2024 12:38 PM    MALBCRT 1270 (H) 12/02/2024 12:38 PM     Lab Results   Component Value Date/Time    COLORURINE DK Yellow 08/13/2018 01:40 PM    SPECGRAVITY 1.021 08/13/2018 01:40 PM    PHURINE 6.0 08/13/2018 01:40 PM    GLUCOSEUR Negative 08/13/2018 01:40 PM    KETONES Negative  08/13/2018 01:40 PM    PROTEINURIN 30 (A) 08/13/2018 01:40 PM     Lab Results   Component Value Date/Time    HEPBSAG Negative 07/02/2019 01:13 PM    HEPBCORIGM Positive (A) 07/02/2019 01:13 PM    HEPBCORTOT Negative 10/01/2019 01:48 PM    HEPCAB Negative 07/02/2019 01:13 PM     Lab Results   Component Value Date/Time    CHOLSTRLTOT 173 12/02/2024 11:28 AM    LDL 98 12/02/2024 11:28 AM    HDL 58 12/02/2024 11:28 AM    TRIGLYCERIDE 84 12/02/2024 11:28 AM    HBA1C 5.5 12/02/2024 11:28 AM       RADIOLOGY RESULTS REVIEWED AND INTERPRETED:  Results for orders placed during the hospital encounter of 11/20/06    DX-KNEES-AP BILATERAL STANDING    Impression  IMPRESSION:    MILD SYMMETRIC MEDIAL COMPARTMENT FEMOROTIBIAL OSTEOARTHROSIS.  NO  EVIDENCE OF ANY INFLAMMATORY  PROCESS.    Results for orders placed during the hospital encounter of 11/22/11    DX-FOOT-COMPLETE 3+    Impression  1. No no fracture or malalignment.    2. Chronic changes as described in the findings section.    Results for orders placed during the hospital encounter of 11/22/11    DX-ANKLE 3+ VIEWS    Impression  Negative right ankle series.  Diffuse soft tissue swelling.    Results for orders placed during the hospital encounter of 02/14/08    DX-KNEE COMPLETE 4+    Impression  IMPRESSION:    1. NO ACUTE FRACTURE IDENTIFIED.    2. SMALL KNEE JOINT EFFUSION.    3. PERIARTICULAR DEOSSIFICATION AND MILD OSTEOARTHROSIS.    Results for orders placed during the hospital encounter of 08/27/18    DX-KNEE 2- RIGHT    Impression  1. Status post right total knee replacement without evidence of hardware complication.    Results for orders placed during the hospital encounter of 11/20/06    DX-JOINT SURVEY-FEET SINGLE VIEW    Impression  IMPRESSION:    1. NO EVIDENCE OF INFLAMMATORY ARTHROPATHY.    2. BLAND DEGENERATIVE ARTHROSIS POLYARTICULAR.    Results for orders placed in visit on 08/01/17    DX-HAND 3+ RIGHT    Impression  Osteoarthritis affecting the  interphalangeal joints with joint space narrowing, subchondral sclerosis and osteophyte formation.    No fracture or acute bony abnormality.    Results for orders placed during the hospital encounter of 06/03/19    DX-HAND 2- LEFT    Impression  1.  Moderate osteoarthritis.  2.  No evidence for inflammatory arthropathy.  3.  Evidence of old trauma to the distal radial metaphysis.    Results for orders placed during the hospital encounter of 11/20/06    DX-JOINT SURVEY-HANDS SINGLE VIEW    Impression  IMPRESSION:    BLAND DEGENERATIVE OSTEOARTHROSIS OF THE INTERPHALANGEAL JOINTS AND THUMB  BASE.  NO EVIDENCE OF INFLAMMATORY ARTHRITIS.    Results for orders placed during the hospital encounter of 06/03/19    DX-SHOULDER 2+ LEFT    Impression  1.  Severe degenerative change of LEFT shoulder with probable associated joint body.  2.  No fracture or dislocation.    Results for orders placed during the hospital encounter of 04/26/10    DS-BONE DENSITY STUDY (DEXA)    Impression  IMPRESSION:    ACCORDING TO THE WORLD HEALTH ORGANIZATION CLASSIFICATION, BONE MINERAL  DENSITY OF THIS PATIENT IS OSTEOPENIC.    Results for orders placed during the hospital encounter of 04/22/09    CT-CHEST (THORAX) W/O    Impression  IMPRESSION:    1. 11 MM NONCALCIFIED SPICULATED SOFT TISSUE MASS ANTERIORLY IN THE RIGHT  LUNG APEX (IMAGE 30 ).  THIS IS SUSPICIOUS FOR BRONCHOGENIC  CARCINOMA.  GIVEN ITS SIZE, IT WOULD LIKELY BE SENSITIVE TO FDG IMAGING  IF METABOLICALLY ACTIVE, AS A CANCER WOULD BE EXPECTED TO BE.    2. SMALL NONSPECIFIC MEDIASTINAL ADENOPATHY.    3. EVIDENCE OF PRIOR GRANULOMATOUS EXPOSURE.    Results for orders placed during the hospital encounter of 04/30/09    MR-KNEE-W/O    Impression  IMPRESSION:    1. MACERATED-TYPE TEAR SEEN INVOLVING THE POSTERIOR HORN AND BODY OF THE  LATERAL MENISCUS UNLESS THE PATIENT HAS HAD PRIOR PARTIAL MENISCECTOMY.    2. CHRONIC MILD SPRAIN TO THE ANTERIOR AND POSTERIOR CRUCIATE  LIGAMENTS.    3. TRICOMPARTMENT DEGENERATIVE CHANGE WHICH APPEARS TO INVOLVE THE  PATELLOFEMORAL AND THE LATERAL FEMOROTIBIAL ARTICULATIONS TO GREATEST  DEGREE.  SOME DEGENERATIVE CHANGE IS ALSO SEEN INVOLVING THE PROXIMAL  TIBIOFIBULAR ARTICULATION.    4. MODERATE-SIZED JOINT EFFUSION WITH EXTENSIVE SYNOVITIS.    5. LIKELY INTRAARTICULAR OSSIFIC LOOSE BODY SEEN ADJACENT TO THE PROXIMAL  TIBIA POSTERIORLY.    6. MULTIPLE LOW SIGNAL AREAS SEEN WITHIN A MODERATE-SIZED BAKER CYST  WHICH MAY REPRESENT EITHER SYNOVITIS OR EXTRUDED INTRAARTICULAR LOOSE  BODIES.      All relevant laboratory and imaging results reported on this note were reviewed and interpreted by me.         Assessment & Plan     Angela Winchester is a 79 y.o. female with history and physical as noted above whose presentation merits the following clinical impressions and recommendations:    1. Seropositive rheumatoid arthritis (HCC)  Moderate to severe disease activity with episodic flares likely due to her continued long-term heavy cigarette smoking, so may not really be a reflection of ineffectiveness of Xeljanz and sulfasalazine. In any case, reasonable at this point to optimize her regimen by switching to Rinvoq and in the meantime treat with a course of prednisone taper for quicker symptom relief.  - CRP QUANTITIVE (NON-CARDIAC); Future  - Sed Rate; Future  - predniSONE (DELTASONE) 10 MG Tab; Take 3 tablets every day for 7 days, then decrease by 0.5 tablet every 7 days until finished. Take in the morning with food.  Dispense: 74 Tablet; Refill: 0  - Upadacitinib ER (RINVOQ) 15 MG TABLET SR 24 HR; Take 15 mg by mouth every day.  Dispense: 30 Tablet; Refill: 5  - Discontinue Xeljanz 11 mg SR daily after starting Rinvoq  - Continue sulfasalazine 1000 mg twice daily     2. Osteoarthritis involving multiple joints on both sides of body  Significant etiology of her biomechanical arthralgia which can be managed supportively.  - Tylenol Arthritis and  Voltaren 1% gel with or without oral NSAIDs as needed when off of prednisone  - She is not inclined to intra-articular steroid injection due to apparent bad experience in the past     3. Multiple atypical skin moles  Chronic problem that was previously evaluated by dermatology and underwent skin biopsy which reportedly showed no evidence of malignancy.  - Routine follow-up with dermatology     4. Immunosuppressed status (HCC)  High risk immunosuppressant use requiring routine monitoring labs prior to every visit to assess for possible side effects including but not limited to myelotoxicity, hepatotoxicity, and opportunistic infections.  - HEP B SURFACE ANTIGEN; Future  - HEP C VIRUS ANTIBODY; Future  - Quantiferon Gold Plus TB (4 tube); Future  - CBC WITH DIFFERENTIAL; Future  - Comp Metabolic Panel; Future  - Need to ascertain age-appropriate vaccines in addition to the ones listed above under immunizations      The above assessment and plan were discussed with the patient who acknowledged understanding of the plan.    FOLLOW-UP: Return in about 3 months (around 4/13/2025) for Short.         Thank you for the opportunity to participate in the care of Angela Winchester.    Basil Ren MD, MS, FACR  Rheumatologist, [facility] Rheumatology, [facility]   of Clinical Medicine, Department of Internal Medicine  [facility]

## 2025-01-14 ENCOUNTER — TELEPHONE (OUTPATIENT)
Dept: RHEUMATOLOGY | Facility: MEDICAL CENTER | Age: 80
End: 2025-01-14
Payer: MEDICARE

## 2025-01-14 ENCOUNTER — DOCUMENTATION (OUTPATIENT)
Dept: PHARMACY | Facility: MEDICAL CENTER | Age: 80
End: 2025-01-14
Payer: MEDICARE

## 2025-01-14 NOTE — PROGRESS NOTES
PHARMACIST CLINICAL ONBOARDING - Clinical Onboarding -   Result = Complete, Next card status: New Start    Therapeutic Category: Rheumatoid Arthritis  ICD10: M05.9  Diagnosis Details: Seropositive rheumatoid arthritis [M05.9]  Medication(s) associated with Therapeutic Category: Rinvoq Tablet Extended Release 24 Hour 15 MG  Medication(s) prescribed for FDA approved indication?   -Rinvoq Tablet Extended Release 24 Hour 15 MG: Yes  Full drug therapy: Rinvoq 15mg tablet #1 tablet by mouth once daily + [Prednisone taper + Sulfasalazine 1000mg by mouth twice daily]  Past treatments: Prednisone, Sulfasalazine, Methotrexate, Xeljanz, Remicade, Humira   Goals of therapy: Promote or maintain optimal therapy administration and adherence, Mitigation of side effects, Decrease disease activity (RAPID-3), Prevent and reduce flares and inflammation    Regimen Appropriateness Review: Rheumatoid Arthritis  Any concerning drug and/or non-drug allergies? No  Any concerning drug interactions (drug-drug or dietary)? Yes, Explanation: CatX: Klor-Con + Cyclobenzaprine = may enhance anticholinergic effects. Cyclobenzaprine is used as needed. Review signs/symptoms during initial consult.   Any concern with prescribed dose (appropriateness, renal, and hepatic adjustments needed)? No  Any comorbidities, past medical history, precautions, or contraindications that pose a medication safety concern? No  Any relevant lab work needed that affects the initial start date? No  Any issues with patient's ability to self-administer medication? No      Patient declined initial clinical pharmacist counseling.

## 2025-01-14 NOTE — TELEPHONE ENCOUNTER
Prior Authorization for Rinvoq 15MG er tablets (Quantity: 30, Days: 30) has been submitted via Cover My Meds: Key ([id number])    Insurance: [employer]    Will follow up in 24-48 business hours.

## 2025-01-14 NOTE — TELEPHONE ENCOUNTER
Prior Authorization for Rinvoq 15MG er tablets  has been approved for a quantity of 30 , day supply 30    Prior Authorization reference number: [id number]  Insurance:   Effective dates: 12/15/2024 to 12/31/2099  Copay: $43     Is patient eligible to fill with [facility]? Yes

## 2025-01-15 ENCOUNTER — HOSPITAL ENCOUNTER (OUTPATIENT)
Dept: LAB | Facility: MEDICAL CENTER | Age: 80
End: 2025-01-15
Attending: SPECIALIST
Payer: MEDICARE

## 2025-01-15 DIAGNOSIS — D84.9 IMMUNOSUPPRESSED STATUS (HCC): ICD-10-CM

## 2025-01-15 PROCEDURE — 36415 COLL VENOUS BLD VENIPUNCTURE: CPT

## 2025-01-15 PROCEDURE — 86480 TB TEST CELL IMMUN MEASURE: CPT

## 2025-01-15 PROCEDURE — 86803 HEPATITIS C AB TEST: CPT

## 2025-01-15 PROCEDURE — 87340 HEPATITIS B SURFACE AG IA: CPT | Mod: GA

## 2025-01-16 LAB
HBV SURFACE AG SER QL: NORMAL
HCV AB SER QL: NORMAL

## 2025-01-17 LAB
GAMMA INTERFERON BACKGROUND BLD IA-ACNC: 0.03 IU/ML
M TB IFN-G BLD-IMP: ABNORMAL
M TB IFN-G CD4+ BCKGRND COR BLD-ACNC: 0 IU/ML
MITOGEN IGNF BCKGRD COR BLD-ACNC: 0.02 IU/ML
QFT TB2 - NIL TBQ2: 0.01 IU/ML

## 2025-01-28 ENCOUNTER — DOCUMENTATION (OUTPATIENT)
Dept: PHARMACY | Facility: MEDICAL CENTER | Age: 80
End: 2025-01-28
Payer: MEDICARE

## 2025-01-28 NOTE — PROGRESS NOTES
Modified First Cycle Assessment   Dx: Seropositive rheumatoid arthritis [M05.9]      Tx prescribed: Rinvoq 15mg tablet #1 tablet by mouth once daily + [Prednisone taper + Sulfasalazine 1000mg by mouth twice daily]    Administration: taking Rinvoq #1 tablet by mouth daily in the morning on an empty stomach; swallowing tablets whole without difficulty      Proper Handling/Disposal: reviewed proper hand washings with administration; advised to dispose of through drug take back program     Storage/Excursion: Keep in original container at room temperature in kitchen. Protects from moisture/light/heat. Keep out of the reach of children and pets.     Duration of therapy: indefinite, or until txt failure/intolerable SE        Adherence & Potential Barriers: no missed doses; uses medication pill box for organization; routine adherence with morning administration      Missed dose mgmt: If you miss a dose, take it as soon as you can. Skip your dose if it's almost time for the next dose. Do not take double or extra doses.     Drug Interruptions/Hold (Infection, Abx Use): hold for infection or surgery, restart to be determined by provided to ensure full recovery from surgery/illness.      List Common, Serious SE & Mitigation Reviewed: declined review citing she read medication material   List Precautions Reviewed: declined review citing she read medication material   List Current SE Reviewed (if applicable): none     Mitigation/mgmt: N/A as no SE reported       S/Sx: less stiffness at hands, shoulders, neck, knees, and feet since starting Rinvoq   # of flares: none  Pain scale (avg last week): 3.5/10   Rapid3 score (RA only): FN Domain Sub-Total: 3.8; Grand Total: 11.3  Clinically Relevant, Abnormal Labs from 1/8/25 unless otherwise noted:     CBC: RDW 51.4 (H)    Chem7: Sodium 130 (L), Chloride 93 (L), BUN 7 (L)    LFTs: AST 9 (L)    ESR: 45    CRP: 3.06    Lipid panel from 12/2/24:  TG 84 HDL 58 LDL 98    HBsAg from  1/15/25: nonreactive     HepC from 1/15/25: nonreactive     TB from 1/15/25: Equivocal (notifying clinic of results)     Vitamin D from 10/11/23: 60     BP/HR: WNL (1/13/25)       Wellness/Lifestyle Counseling: reviewed infection precaution measures where she washes hands + uses hand ; advised avoiding those who are sick/ill and to disinfect common surfaces     Immunizations: declined vaccines (yearly flu, updated COVID booster, PCV20, RSV, and Td/Tdap)      Med Rec/Updated drug list:   EMR accurate, no medication list inaccuracies. Reviewed with patient.  DI Check:      CatX: Klor-Con + Cyclobenzaprine = may enhance anticholinergic effects. Cyclobenzaprine is used as needed. Denied symptoms. Continue to monitor.   Common DI & Dietary Avoidance:    Reviewed to not start any new meds/herbs/OTCs/supplements without speaking to clinic/pharmacist to check for DDIs.     Avoid live vaccines    Avoid grapefruit/grapefruit juice      Goals of Therapy:     Promote or maintain optimal therapy administration and adherence    Mitigation of side effects    Decrease disease activity (RAPID-3)    Prevent and reduce flares and inflammation  Patient has agreed/understands to goals of therapy during education/counseling    Additional: I reached Angela for modified first cycle check-in following her start of Rinvoq for management of RA. Angela shared she has been taking Rinvoq since 1/24/25 with no missed doses or observed side effects. She has been feeling very well with improved RA symtpoms as she has been following Prednisone taper directions for the past 2 weeks. She will finish Prednisone tomorrow, 1/29/25. She reports being able to complete tasks/activites which she hasn't been able to do in months such as making a fist with both hands where she is no longer dropping as many objects. All questions were answered during the call where she thanked me for the time spent reviewing Rinvoq. She has the clinical pharmacist line  saved and welcomes future calls for medication support.   **Added endurance as functional limitation with limited activity/exercise capacity and reported inability to walk beyond 0.5 miles**

## 2025-04-01 DIAGNOSIS — M05.9 SEROPOSITIVE RHEUMATOID ARTHRITIS (HCC): ICD-10-CM

## 2025-04-01 NOTE — TELEPHONE ENCOUNTER
Received request via: Pharmacy    Was the patient seen in the last year in this department? Yes    Does the patient have an active prescription (recently filled or refills available) for medication(s) requested? No    [facility]    Does the patient have senior living Plus and need 100-day supply? (This applies to ALL medications) Patient does not have SCP

## 2025-04-03 RX ORDER — SULFASALAZINE 500 MG/1
1000 TABLET, DELAYED RELEASE ORAL 2 TIMES DAILY WITH MEALS
Qty: 360 TABLET | Refills: 3 | Status: SHIPPED | OUTPATIENT
Start: 2025-04-03

## 2025-04-07 ENCOUNTER — HOSPITAL ENCOUNTER (OUTPATIENT)
Dept: LAB | Facility: MEDICAL CENTER | Age: 80
End: 2025-04-07
Attending: STUDENT IN AN ORGANIZED HEALTH CARE EDUCATION/TRAINING PROGRAM
Payer: MEDICARE

## 2025-04-07 ENCOUNTER — HOSPITAL ENCOUNTER (OUTPATIENT)
Dept: LAB | Facility: MEDICAL CENTER | Age: 80
End: 2025-04-07
Attending: NURSE PRACTITIONER
Payer: MEDICARE

## 2025-04-07 DIAGNOSIS — M05.9 SEROPOSITIVE RHEUMATOID ARTHRITIS (HCC): ICD-10-CM

## 2025-04-07 DIAGNOSIS — D84.9 IMMUNOSUPPRESSED STATUS (HCC): ICD-10-CM

## 2025-04-07 LAB
BASOPHILS # BLD AUTO: 0.9 % (ref 0–1.8)
BASOPHILS # BLD: 0.08 K/UL (ref 0–0.12)
EOSINOPHIL # BLD AUTO: 0.08 K/UL (ref 0–0.51)
EOSINOPHIL NFR BLD: 0.9 % (ref 0–6.9)
ERYTHROCYTE [DISTWIDTH] IN BLOOD BY AUTOMATED COUNT: 56.3 FL (ref 35.9–50)
ERYTHROCYTE [SEDIMENTATION RATE] IN BLOOD BY WESTERGREN METHOD: 43 MM/HOUR (ref 0–25)
EST. AVERAGE GLUCOSE BLD GHB EST-MCNC: 103 MG/DL
HBA1C MFR BLD: 5.2 % (ref 4–5.6)
HCT VFR BLD AUTO: 40.9 % (ref 37–47)
HGB BLD-MCNC: 13.7 G/DL (ref 12–16)
IMM GRANULOCYTES # BLD AUTO: 0.13 K/UL (ref 0–0.11)
IMM GRANULOCYTES NFR BLD AUTO: 1.5 % (ref 0–0.9)
LYMPHOCYTES # BLD AUTO: 2.14 K/UL (ref 1–4.8)
LYMPHOCYTES NFR BLD: 24.4 % (ref 22–41)
MCH RBC QN AUTO: 32.7 PG (ref 27–33)
MCHC RBC AUTO-ENTMCNC: 33.5 G/DL (ref 32.2–35.5)
MCV RBC AUTO: 97.6 FL (ref 81.4–97.8)
MONOCYTES # BLD AUTO: 0.98 K/UL (ref 0–0.85)
MONOCYTES NFR BLD AUTO: 11.2 % (ref 0–13.4)
NEUTROPHILS # BLD AUTO: 5.37 K/UL (ref 1.82–7.42)
NEUTROPHILS NFR BLD: 61.1 % (ref 44–72)
NRBC # BLD AUTO: 0 K/UL
NRBC BLD-RTO: 0 /100 WBC (ref 0–0.2)
PLATELET # BLD AUTO: 328 K/UL (ref 164–446)
PMV BLD AUTO: 9.2 FL (ref 9–12.9)
RBC # BLD AUTO: 4.19 M/UL (ref 4.2–5.4)
WBC # BLD AUTO: 8.8 K/UL (ref 4.8–10.8)

## 2025-04-07 PROCEDURE — 83036 HEMOGLOBIN GLYCOSYLATED A1C: CPT | Mod: GA

## 2025-04-07 PROCEDURE — 80053 COMPREHEN METABOLIC PANEL: CPT

## 2025-04-07 PROCEDURE — 82306 VITAMIN D 25 HYDROXY: CPT

## 2025-04-07 PROCEDURE — 86140 C-REACTIVE PROTEIN: CPT

## 2025-04-07 PROCEDURE — 80053 COMPREHEN METABOLIC PANEL: CPT | Mod: 91

## 2025-04-07 PROCEDURE — 36415 COLL VENOUS BLD VENIPUNCTURE: CPT

## 2025-04-07 PROCEDURE — 85652 RBC SED RATE AUTOMATED: CPT

## 2025-04-07 PROCEDURE — 82043 UR ALBUMIN QUANTITATIVE: CPT

## 2025-04-07 PROCEDURE — 82570 ASSAY OF URINE CREATININE: CPT

## 2025-04-07 PROCEDURE — 80061 LIPID PANEL: CPT

## 2025-04-07 PROCEDURE — 85025 COMPLETE CBC W/AUTO DIFF WBC: CPT

## 2025-04-08 LAB
25(OH)D3 SERPL-MCNC: 59 NG/ML (ref 30–100)
ALBUMIN SERPL BCP-MCNC: 4.3 G/DL (ref 3.2–4.9)
ALBUMIN SERPL BCP-MCNC: 4.3 G/DL (ref 3.2–4.9)
ALBUMIN/GLOB SERPL: 1.3 G/DL
ALBUMIN/GLOB SERPL: 1.4 G/DL
ALP SERPL-CCNC: 82 U/L (ref 30–99)
ALP SERPL-CCNC: 82 U/L (ref 30–99)
ALT SERPL-CCNC: 14 U/L (ref 2–50)
ALT SERPL-CCNC: 14 U/L (ref 2–50)
ANION GAP SERPL CALC-SCNC: 14 MMOL/L (ref 7–16)
ANION GAP SERPL CALC-SCNC: 14 MMOL/L (ref 7–16)
AST SERPL-CCNC: 21 U/L (ref 12–45)
AST SERPL-CCNC: 22 U/L (ref 12–45)
BILIRUB SERPL-MCNC: 0.4 MG/DL (ref 0.1–1.5)
BILIRUB SERPL-MCNC: 0.4 MG/DL (ref 0.1–1.5)
BUN SERPL-MCNC: 12 MG/DL (ref 8–22)
BUN SERPL-MCNC: 13 MG/DL (ref 8–22)
CALCIUM ALBUM COR SERPL-MCNC: 9.3 MG/DL (ref 8.5–10.5)
CALCIUM ALBUM COR SERPL-MCNC: 9.3 MG/DL (ref 8.5–10.5)
CALCIUM SERPL-MCNC: 9.5 MG/DL (ref 8.5–10.5)
CALCIUM SERPL-MCNC: 9.5 MG/DL (ref 8.5–10.5)
CHLORIDE SERPL-SCNC: 97 MMOL/L (ref 96–112)
CHLORIDE SERPL-SCNC: 97 MMOL/L (ref 96–112)
CHOLEST SERPL-MCNC: 166 MG/DL (ref 100–199)
CO2 SERPL-SCNC: 21 MMOL/L (ref 20–33)
CO2 SERPL-SCNC: 22 MMOL/L (ref 20–33)
CREAT SERPL-MCNC: 0.88 MG/DL (ref 0.5–1.4)
CREAT SERPL-MCNC: 0.88 MG/DL (ref 0.5–1.4)
CREAT UR-MCNC: 9.9 MG/DL
CRP SERPL HS-MCNC: 1.34 MG/DL (ref 0–0.75)
GFR SERPLBLD CREATININE-BSD FMLA CKD-EPI: 67 ML/MIN/1.73 M 2
GFR SERPLBLD CREATININE-BSD FMLA CKD-EPI: 67 ML/MIN/1.73 M 2
GLOBULIN SER CALC-MCNC: 3.1 G/DL (ref 1.9–3.5)
GLOBULIN SER CALC-MCNC: 3.2 G/DL (ref 1.9–3.5)
GLUCOSE SERPL-MCNC: 93 MG/DL (ref 65–99)
GLUCOSE SERPL-MCNC: 95 MG/DL (ref 65–99)
HDLC SERPL-MCNC: 65 MG/DL
LDLC SERPL CALC-MCNC: 84 MG/DL
MICROALBUMIN UR-MCNC: 8.7 MG/DL
MICROALBUMIN/CREAT UR: 879 MG/G (ref 0–30)
POTASSIUM SERPL-SCNC: 4.4 MMOL/L (ref 3.6–5.5)
POTASSIUM SERPL-SCNC: 4.7 MMOL/L (ref 3.6–5.5)
PROT SERPL-MCNC: 7.4 G/DL (ref 6–8.2)
PROT SERPL-MCNC: 7.5 G/DL (ref 6–8.2)
SODIUM SERPL-SCNC: 132 MMOL/L (ref 135–145)
SODIUM SERPL-SCNC: 133 MMOL/L (ref 135–145)
TRIGL SERPL-MCNC: 84 MG/DL (ref 0–149)

## 2025-04-16 ENCOUNTER — OFFICE VISIT (OUTPATIENT)
Dept: RHEUMATOLOGY | Facility: MEDICAL CENTER | Age: 80
End: 2025-04-16
Attending: STUDENT IN AN ORGANIZED HEALTH CARE EDUCATION/TRAINING PROGRAM
Payer: MEDICARE

## 2025-04-16 VITALS
SYSTOLIC BLOOD PRESSURE: 140 MMHG | WEIGHT: 146 LBS | OXYGEN SATURATION: 96 % | RESPIRATION RATE: 16 BRPM | HEIGHT: 64 IN | DIASTOLIC BLOOD PRESSURE: 60 MMHG | BODY MASS INDEX: 24.92 KG/M2 | TEMPERATURE: 97.8 F | HEART RATE: 92 BPM

## 2025-04-16 DIAGNOSIS — M05.9 SEROPOSITIVE RHEUMATOID ARTHRITIS (HCC): ICD-10-CM

## 2025-04-16 DIAGNOSIS — D22.9 MULTIPLE ATYPICAL SKIN MOLES: ICD-10-CM

## 2025-04-16 DIAGNOSIS — M15.9 OSTEOARTHRITIS INVOLVING MULTIPLE JOINTS ON BOTH SIDES OF BODY: ICD-10-CM

## 2025-04-16 DIAGNOSIS — D84.9 IMMUNOSUPPRESSED STATUS (HCC): ICD-10-CM

## 2025-04-16 PROCEDURE — 3077F SYST BP >= 140 MM HG: CPT | Performed by: STUDENT IN AN ORGANIZED HEALTH CARE EDUCATION/TRAINING PROGRAM

## 2025-04-16 PROCEDURE — 99214 OFFICE O/P EST MOD 30 MIN: CPT | Performed by: STUDENT IN AN ORGANIZED HEALTH CARE EDUCATION/TRAINING PROGRAM

## 2025-04-16 PROCEDURE — 3078F DIAST BP <80 MM HG: CPT | Performed by: STUDENT IN AN ORGANIZED HEALTH CARE EDUCATION/TRAINING PROGRAM

## 2025-04-16 PROCEDURE — 99212 OFFICE O/P EST SF 10 MIN: CPT | Performed by: STUDENT IN AN ORGANIZED HEALTH CARE EDUCATION/TRAINING PROGRAM

## 2025-04-16 ASSESSMENT — FIBROSIS 4 INDEX: FIB4 SCORE: 1.38

## 2025-04-16 NOTE — PATIENT INSTRUCTIONS
[facility]    Below are important guidelines to help you navigate your health care needs and assist us in caring for you safely and effectively. We encourage you to carefully read and understand this information and adhere to them accordingly.    [employer] Messaging and Phone Calls:  Diagnosis and Treatment - For a detailed explanation of your condition and treatment plan from today's visit, refer to the visit note on [employer] via the following steps:  Log in to [employer] and click on “Visits” at the top.  Scroll down to “Past Visits” under Appointments.  Click on “View Notes” under the appropriate visit date.  Questions or Concerns - MyChart messaging is for non-urgent matters that do not require immediate attention and should be brief with no more than two questions or concerns. If you have multiple questions or concerns, we ask that you schedule an appointment to have them properly addressed.  Response to Messages - [employer] messages are addressed throughout the week depending on clinical availability, so we ask that you allow up to one week for a response.  Phone Calls and Voicemails - Phone calls and voicemail messages are reserved for time-sensitive matters that cannot wait to be addressed via [employer]. We ask that you refrain from calling the office multiple times or leaving multiple voicemails regarding the same issue as doing so may lead to delays in response time.  Urgent Issues - For urgent medical matters or medical emergencies that cannot wait, you are advised to go to your nearest Urgent Care or Emergency Department for immediate attention.    Laboratory Tests and Imaging Studies:  Future Lab and Imaging Orders - We ask that you get your lab tests and imaging studies done no later than one week before your follow-up visit unless instructed otherwise.  Results Communication - You may see some test results marked as “abnormal” that are not necessarily significant or concerning. If  there are significant abnormalities on your test results that warrant further action, you will be notified via MyChart or phone call, otherwise they will be addressed at your follow-up visit.    Prescriptions and Refill Requests:  General Prescriptions (e.g. prednisone, hydroxychloroquine, leflunomide, methotrexate, etc.) - These are sent to Retail Pharmacies, so all refill requests of these medications should be directed to your local pharmacy.  Specialty Prescriptions (e.g. Enbrel, Humira, Cosentyx, Xeljanz, etc.) - These are sent to Specialty Pharmacies, so all refill requests of these medications should be directed to your designated specialty pharmacy.  Infusion Prescriptions (e.g. Remicade, Simponi Aria, Rituxan, Saphnelo, etc.) - These are sent to Outpatient Infusion Centers, so all scheduling requests of these medications should be directed to your local infusion center.    Medication Risks and Adverse Effects:  Immunosuppressed Status - Steroids and antirheumatic drugs are immunosuppressants, so they increase the risk of infections and can have side effects on various organ systems in your body, though most of them are uncommon.  Potential Side Effects - Be sure to read the drug package inserts to learn about the potential side effects of your medications before you start taking them and take them exactly as prescribed unless instructed otherwise.  In Case of Side Effects - If you experience any significant side effects, stop taking the medication immediately and promptly notify the prescriber. Depending on the severity of the side effects, consider going to an Urgent Care or Emergency Department for immediate attention.    Immunizations and Health Screening:  Vaccinations - If you are on immunosuppressive therapy, it is important that you are up to date on age-appropriate immunizations, particularly shingles and pneumonia vaccines, which you can request from your primary care provider or from us at your  next appointment.  Screening Tests - It is also important that you are up to date on age-appropriate screening tests, such as pap smear, mammography, and colonoscopy, which you can request from your primary care provider.    Educational and Supportive Resources:  [facility] ([email or web address]) - Visit our website to learn more about your condition and other rheumatic diseases, and gain access to many helpful resources for them.  Disposal of Old Medications (www.[place].gov/everyday-takeback-day) - Visit the Drug Enforcement Administration website to find a nearby location where you can properly dispose of old medications you no longer need.  Disposal of Used [place] (www.safeneedledisposal.org) - Visit the Safe Needle Disposal Organization website to find a nearby location where you can properly dispose of used needles from your injectable medications.

## 2025-04-16 NOTE — PROGRESS NOTES
[facility] RHEUMATOLOGY  [address] [facility], [address], [place] [address]  Phone: [phone] ? Fax: [phone]  [facility]/Health-Services/Rheumatology    FOLLOW-UP VISIT NOTE         Subjective     DATE OF SERVICE: 04/16/2025    PRIMARY CARE PROVIDER:  YA Silvestre  [address] [address]    PATIENT IDENTIFICATION:  Angela Winchester  [address]  [place] [address]    YOB: 1945    MEDICAL RECORD NUMBER: [id number]         CHIEF COMPLAINT:   Chief Complaint   Patient presents with    Follow-Up     Seropositive rheumatoid arthritis (HCC       RHEUMATOLOGIC HISTORY:  Angela Winchester is a 79 y.o. female with pertinent history notable for seropositive rheumatoid arthritis diagnosed in 2011 (symptomatic from 2009), osteoarthritis of multiple joints (hands, shoulders, knees, feet) s/p bilateral knee replacements, and DJD of cervical/thoracic spine. Previously under the care of a local rheumatologist who has retired (Dr. Conor Hull), she initially presented on 8/18/22 to establish care for continued management of her condition. Noted oral surgery in 5/2022 for which her DMARDs were held for almost a month prior, so had a flare for which Dr. Hull prescribed a 10-day course of prednisone 20 mg taper which was very helpful. Reported residual but tolerable joint/muscle pain in her hands, wrists, left shoulder, neck, lower back, knees, ankles and feet. These were associated with up to 1 hour of morning stiffness that improved with activity but her joint pain tended to worsen with much activity over the course of the day. Noted that she had tried Voltaren gel and had steroid injections to her left shoulder and both knees in the past which were not very helpful. She noted that orthopedic surgery recommended left shoulder replacement but she was not inclined to undergoing surgery.     Pertinent treatments: Prednisone 20>5 mg tapers (on/off since 2011, effective), Remicade  (discontinued after 3 infusions due to ineffectiveness), Humira (effective for 8 years, then became ineffective), methotrexate (stopped in 8/2022 due to hair loss and macrocytic anemia), Xeljanz 11 mg SR daily (2020-1/2025, effective), sulfasalazine 500>1000 mg twice daily (started 8/2022, dose increased 7/2023-present, effective), Rinvoq 15 mg SR daily (ordered 1/13/25-present, effective).     Pertinent positive labs: Positive anti- and  (in 7/2019); CRP 1.34<3.06<2.86 and ESR 43<45<92 (in 4/2025<1/2025<9/2024); equivocal QTB (in 1/2025 presumably due to moderate dose prednisone use).     Pertinent negative labs: Negative HCV (in 11/2018), HBV (in 10/2019), QTB (in 7/2019), folate and vitamin B12 (in 4/2024), HBV and HCV (in 1/2025), vitamin D25, eGFR, creatinine, acceptable LFTs and CBC (in 4/2025).     Pertinent XR imaging: Hands and feet (in 6/2019) with moderate osteoarthritis but no evidence of inflammatory arthropathy. Shoulders (in 6/2019) with severe osteoarthritis of left GH joint, and mild bilateral osteoarthritis of AC joint.       INTERVAL HISTORY:  Reports interval history as noted on the questionnaire below or scanned under media tab.            REVIEW OF SYSTEMS:  Except as noted in the history above, relevant review of systems with emphasis on autoimmune rheumatic diseases was otherwise negative.      CURRENT PROBLEM LIST:  Patient Active Problem List    Diagnosis Date Noted    Immunosuppressed status (HCC) 04/20/2023    Multiple atypical skin moles 04/20/2023    Long-term use of immunosuppressant medication 09/15/2022    Seropositive rheumatoid arthritis (HCC) 09/15/2022    Osteoarthritis involving multiple joints on both sides of body 09/15/2022       PAST MEDICAL HISTORY:  Past Medical History:   Diagnosis Date    Arthritis     Breath shortness     with exertion    Bronchitis 01/2018    Cancer (HCC) 2009    LUNG CANCER= 8/13/2018 pt states she had lung resection and it was fungal  infection, no cancer; Skin    Cataract     Emphysema of lung (HCC)     High cholesterol     Hypertension     Pain     knee/hand    Pneumonia 02/2018    RA (rheumatoid arthritis) (HCC)     Sleep apnea     CPAP with 2.5 liters oxygen    Snoring        PAST SURGICAL HISTORY:  Past Surgical History:   Procedure Laterality Date    KNEE ARTHROPLASTY TOTAL Right 8/27/2018    Procedure: KNEE ARTHROPLASTY TOTAL;  Surgeon: Fabian Kennedy M.D.;  Location: SURGERY [facility];  Service: Orthopedics    TIBIAL OSTEOTOMY Right 8/27/2018    Procedure: TIBIAL OSTEOTOMY - POSS TUBERCLE;  Surgeon: Fabian Kennedy M.D.;  Location: SURGERY [facility];  Service: Orthopedics    CATARACT PHACO WITH IOL  7/1/2013    Performed by Tyron Quiles M.D. at SURGERY [facility]    CATARACT PHACO WITH IOL  6/17/2013    Performed by Tyron Quiles M.D. at SURGERY [facility]    CARPAL TUNNEL ENDOSCOPIC  2/5/2010    Performed by EREN KOVACS at SURGERY [facility]    TUMOR EXCISION WITH BIOPSY Left 2010    left wrist for skin cancer, done in the office    THORACOSCOPY  6/8/2009    Performed by GANSER, JOHN H at SURGERY [facility]       SOCIAL HISTORY:  Social History     Socioeconomic History    Marital status:      Spouse name: Not on file    Number of children: Not on file    Years of education: Not on file    Highest education level: Not on file   Occupational History    Not on file   Tobacco Use    Smoking status: Every Day     Current packs/day: 2.00     Average packs/day: 2.0 packs/day for 42.0 years (84.0 ttl pk-yrs)     Types: Cigarettes    Smokeless tobacco: Never    Tobacco comments:     2-3 ppd; 8/13/18 currently 0.5ppd   Vaping Use    Vaping status: Unknown   Substance and Sexual Activity    Alcohol use: No     Alcohol/week: 0.0 oz    Drug use: No    Sexual activity: Not on file   Other Topics Concern    Not on file   Social History Narrative    Not on file     Social Drivers of  Health     Financial Resource Strain: Not on file   Food Insecurity: Not on file   Transportation Needs: Not on file   Physical Activity: Not on file   Stress: Not on file   Social Connections: Not on file   Intimate Partner Violence: Not on file   Housing Stability: Not on file       FAMILY HISTORY:  History reviewed. No pertinent family history.    MEDICATIONS:  Current Outpatient Medications   Medication Sig    sulfaSALAzine (AZULFIDINE EN-TAB) 500 MG EC tablet TAKE 2 TABLETS BY MOUTH TWICE DAILY WITH MEALS    Upadacitinib ER (RINVOQ) 15 MG TABLET SR 24 HR Take 15 mg by mouth every day.    amLODIPine (NORVASC) 10 MG Tab amlodipine 10 mg tablet    cyclobenzaprine (FLEXERIL) 10 mg Tab cyclobenzaprine 10 mg tablet    fluticasone (FLONASE) 50 MCG/ACT nasal spray fluticasone propionate 50 mcg/actuation nasal spray,suspension    potassium chloride ER (KLOR-CON) 10 MEQ tablet Take 10 mEq by mouth every day.    furosemide (LASIX) 20 MG Tab TAKE 1/2 TO 1 TABLET BY MOUTH DAILY    TRELEGY ELLIPTA 200-62.5-25 MCG/INH AEROSOL POWDER, BREATH ACTIVATED     albuterol 108 (90 Base) MCG/ACT Aero Soln inhalation aerosol Inhale 1-2 Puffs by mouth every four hours as needed for Shortness of Breath.    Cholecalciferol (VITAMIN D3) 5000 units Cap Take 1 Cap by mouth every day.    losartan (COZAAR) 100 MG Tab Take 100 mg by mouth every day.    metoprolol SR (TOPROL XL) 25 MG TABLET SR 24 HR Take 25 mg by mouth every day.    ALPRAZolam (XANAX) 0.5 MG Tab Take 0.5 mg by mouth at bedtime as needed for Sleep.    atorvastatin (LIPITOR) 20 MG Tab Take 20 mg by mouth every evening.    hydrocodone-acetaminophen (MAXIDONE)  MG per tablet Take 1-2 Tabs by mouth every 6 hours as needed for Mild Pain.    predniSONE (DELTASONE) 10 MG Tab Take 3 tablets every day for 7 days, then decrease by 0.5 tablet every 7 days until finished. Take in the morning with food. (Patient not taking: Reported on 4/16/2025)    benzonatate (TESSALON PERLES) 100 MG  "Cap Take 1 Capsule by mouth 3 times a day as needed for Cough. (Patient not taking: Reported on 4/16/2025)    metFORMIN (GLUCOPHAGE) 500 MG Tab Take 500 mg by mouth 2 times a day. (Patient not taking: Reported on 4/16/2025)       ALLERGIES:   No Known Allergies    IMMUNIZATIONS:  Immunization History   Administered Date(s) Administered    Deepak SARS-CoV-2 Vaccine 03/21/2021, 11/03/2021            Objective     Vital Signs: BP (!) 140/60   Pulse 92   Temp 36.6 °C (97.8 °F) (Temporal)   Resp 16   Ht 1.626 m (5' 4\")   Wt 66.2 kg (146 lb)   SpO2 96% Body mass index is 25.06 kg/m².    General: Appears well and comfortable  Eyes: No scleral or conjunctival lesions  ENT: No apparent oral, nasal, or ear lesions  Head/Neck: No apparent scalp or neck lesions  Cardiovascular: Regular rate and rhythm  Respiratory: Breathing quiet and unlabored  Gastrointestinal: No apparent organomegaly or abdominal masses  Integumentary: Multiple hyperpigmented moles with skin tags on face and upper chest  Musculoskeletal: Synovial hypertrophy of bilateral 2nd-3rd MCP joints without overt synovitis; no significant joint tenderness, swelling, warmth or erythema; no significant restriction in range of motion of joints examined  Neurologic: No focal sensory or motor deficits  Psychiatric: Mood and affect appropriate      LABORATORY RESULTS REVIEWED AND INTERPRETED:  Lab Results   Component Value Date/Time    CREACTPROT 1.34 (H) 04/07/2025 01:32 PM    SEDRATEWES 43 (H) 04/07/2025 01:32 PM    URICACID 6.3 03/20/2012 02:49 PM     Lab Results   Component Value Date/Time    RHEUMFACTN 107 (H) 07/02/2019 01:13 PM    CCPANTIBODY 174 (H) 07/02/2019 01:13 PM     Lab Results   Component Value Date/Time    ANTINUCAB None Detected 07/02/2019 01:13 PM     Lab Results   Component Value Date/Time    TSHULTRASEN 5.640 (H) 10/11/2023 10:26 AM    FREET4 1.24 03/20/2012 02:49 PM     Lab Results   Component Value Date/Time    25HYDROXY 59 04/07/2025 01:32 " PM     Lab Results   Component Value Date/Time    FERRITIN 175.8 03/31/2016 01:04 PM    IRON 64 03/31/2016 01:04 PM    TRANSFERRIN 216 03/31/2016 01:05 PM    FOLATE >40.0 04/23/2024 12:38 PM    PROTHROMBTM 10.6 06/04/2009 04:50 PM    INR 0.91 06/04/2009 04:50 PM     Lab Results   Component Value Date/Time    WBC 8.8 04/07/2025 01:32 PM    RBC 4.19 (L) 04/07/2025 01:32 PM    HEMOGLOBIN 13.7 04/07/2025 01:32 PM    HEMATOCRIT 40.9 04/07/2025 01:32 PM    MCV 97.6 04/07/2025 01:32 PM    MCH 32.7 04/07/2025 01:32 PM    MCHC 33.5 04/07/2025 01:32 PM    RDW 56.3 (H) 04/07/2025 01:32 PM    PLATELETCT 328 04/07/2025 01:32 PM    MPV 9.2 04/07/2025 01:32 PM    NEUTS 5.37 04/07/2025 01:32 PM    LYMPHOCYTES 24.40 04/07/2025 01:32 PM    MONOCYTES 11.20 04/07/2025 01:32 PM    EOSINOPHILS 0.90 04/07/2025 01:32 PM    BASOPHILS 0.90 04/07/2025 01:32 PM    ANISOCYTOSIS 1+ 07/02/2019 01:13 PM     Lab Results   Component Value Date/Time    ASTSGOT 21 04/07/2025 01:32 PM    ASTSGOT 22 04/07/2025 01:32 PM    ALTSGPT 14 04/07/2025 01:32 PM    ALTSGPT 14 04/07/2025 01:32 PM    ALKPHOSPHAT 82 04/07/2025 01:32 PM    ALKPHOSPHAT 82 04/07/2025 01:32 PM    TBILIRUBIN 0.4 04/07/2025 01:32 PM    TBILIRUBIN 0.4 04/07/2025 01:32 PM    TOTPROTEIN 7.5 04/07/2025 01:32 PM    TOTPROTEIN 7.4 04/07/2025 01:32 PM    ALBUMIN 4.3 04/07/2025 01:32 PM    ALBUMIN 4.3 04/07/2025 01:32 PM     Lab Results   Component Value Date/Time    SODIUM 133 (L) 04/07/2025 01:32 PM    SODIUM 132 (L) 04/07/2025 01:32 PM    POTASSIUM 4.7 04/07/2025 01:32 PM    POTASSIUM 4.4 04/07/2025 01:32 PM    CHLORIDE 97 04/07/2025 01:32 PM    CHLORIDE 97 04/07/2025 01:32 PM    CO2 22 04/07/2025 01:32 PM    CO2 21 04/07/2025 01:32 PM    GLUCOSE 95 04/07/2025 01:32 PM    GLUCOSE 93 04/07/2025 01:32 PM    BUN 13 04/07/2025 01:32 PM    BUN 12 04/07/2025 01:32 PM    CREATININE 0.88 04/07/2025 01:32 PM    CREATININE 0.88 04/07/2025 01:32 PM    CALCIUM 9.5 04/07/2025 01:32 PM    CALCIUM 9.5  04/07/2025 01:32 PM    MAGNESIUM 1.9 03/31/2016 01:04 PM     Lab Results   Component Value Date/Time    MICROALBUR 8.7 04/07/2025 01:32 PM    MALBCRT 879 (H) 04/07/2025 01:32 PM     Lab Results   Component Value Date/Time    COLORURINE DK Yellow 08/13/2018 01:40 PM    SPECGRAVITY 1.021 08/13/2018 01:40 PM    PHURINE 6.0 08/13/2018 01:40 PM    GLUCOSEUR Negative 08/13/2018 01:40 PM    KETONES Negative 08/13/2018 01:40 PM    PROTEINURIN 30 (A) 08/13/2018 01:40 PM     Lab Results   Component Value Date/Time    HEPBSAG Non-Reactive 01/15/2025 12:05 PM    HEPBCORIGM Positive (A) 07/02/2019 01:13 PM    HEPBCORTOT Negative 10/01/2019 01:48 PM    HEPCAB Non-Reactive 01/15/2025 12:05 PM     Lab Results   Component Value Date/Time    CHOLSTRLTOT 166 04/07/2025 01:32 PM    LDL 84 04/07/2025 01:32 PM    HDL 65 04/07/2025 01:32 PM    TRIGLYCERIDE 84 04/07/2025 01:32 PM    HBA1C 5.2 04/07/2025 01:32 PM       RADIOLOGY RESULTS REVIEWED AND INTERPRETED:  Results for orders placed during the hospital encounter of 11/20/06    DX-KNEES-AP BILATERAL STANDING    Impression  IMPRESSION:    MILD SYMMETRIC MEDIAL COMPARTMENT FEMOROTIBIAL OSTEOARTHROSIS.  NO  EVIDENCE OF ANY INFLAMMATORY  PROCESS.    Results for orders placed during the hospital encounter of 11/22/11    DX-FOOT-COMPLETE 3+    Impression  1. No no fracture or malalignment.    2. Chronic changes as described in the findings section.    Results for orders placed during the hospital encounter of 11/22/11    DX-ANKLE 3+ VIEWS    Impression  Negative right ankle series.  Diffuse soft tissue swelling.    Results for orders placed during the hospital encounter of 02/14/08    DX-KNEE COMPLETE 4+    Impression  IMPRESSION:    1. NO ACUTE FRACTURE IDENTIFIED.    2. SMALL KNEE JOINT EFFUSION.    3. PERIARTICULAR DEOSSIFICATION AND MILD OSTEOARTHROSIS.    Results for orders placed during the hospital encounter of 08/27/18    DX-KNEE 2- RIGHT    Impression  1. Status post right total  knee replacement without evidence of hardware complication.    Results for orders placed during the hospital encounter of 11/20/06    DX-JOINT SURVEY-FEET SINGLE VIEW    Impression  IMPRESSION:    1. NO EVIDENCE OF INFLAMMATORY ARTHROPATHY.    2. BLAND DEGENERATIVE ARTHROSIS POLYARTICULAR.    Results for orders placed in visit on 08/01/17    DX-HAND 3+ RIGHT    Impression  Osteoarthritis affecting the interphalangeal joints with joint space narrowing, subchondral sclerosis and osteophyte formation.    No fracture or acute bony abnormality.    Results for orders placed during the hospital encounter of 06/03/19    DX-HAND 2- LEFT    Impression  1.  Moderate osteoarthritis.  2.  No evidence for inflammatory arthropathy.  3.  Evidence of old trauma to the distal radial metaphysis.    Results for orders placed during the hospital encounter of 11/20/06    DX-JOINT SURVEY-HANDS SINGLE VIEW    Impression  IMPRESSION:    BLAND DEGENERATIVE OSTEOARTHROSIS OF THE INTERPHALANGEAL JOINTS AND THUMB  BASE.  NO EVIDENCE OF INFLAMMATORY ARTHRITIS.    Results for orders placed during the hospital encounter of 06/03/19    DX-SHOULDER 2+ LEFT    Impression  1.  Severe degenerative change of LEFT shoulder with probable associated joint body.  2.  No fracture or dislocation.    Results for orders placed during the hospital encounter of 04/26/10    DS-BONE DENSITY STUDY (DEXA)    Impression  IMPRESSION:    ACCORDING TO THE WORLD HEALTH ORGANIZATION CLASSIFICATION, BONE MINERAL  DENSITY OF THIS PATIENT IS OSTEOPENIC.    Results for orders placed during the hospital encounter of 04/22/09    CT-CHEST (THORAX) W/O    Impression  IMPRESSION:    1. 11 MM NONCALCIFIED SPICULATED SOFT TISSUE MASS ANTERIORLY IN THE RIGHT  LUNG APEX (IMAGE 30 ).  THIS IS SUSPICIOUS FOR BRONCHOGENIC  CARCINOMA.  GIVEN ITS SIZE, IT WOULD LIKELY BE SENSITIVE TO FDG IMAGING  IF METABOLICALLY ACTIVE, AS A CANCER WOULD BE EXPECTED TO BE.    2. SMALL NONSPECIFIC  MEDIASTINAL ADENOPATHY.    3. EVIDENCE OF PRIOR GRANULOMATOUS EXPOSURE.    Results for orders placed during the hospital encounter of 04/30/09    MR-KNEE-W/O    Impression  IMPRESSION:    1. MACERATED-TYPE TEAR SEEN INVOLVING THE POSTERIOR HORN AND BODY OF THE  LATERAL MENISCUS UNLESS THE PATIENT HAS HAD PRIOR PARTIAL MENISCECTOMY.    2. CHRONIC MILD SPRAIN TO THE ANTERIOR AND POSTERIOR CRUCIATE LIGAMENTS.    3. TRICOMPARTMENT DEGENERATIVE CHANGE WHICH APPEARS TO INVOLVE THE  PATELLOFEMORAL AND THE LATERAL FEMOROTIBIAL ARTICULATIONS TO GREATEST  DEGREE.  SOME DEGENERATIVE CHANGE IS ALSO SEEN INVOLVING THE PROXIMAL  TIBIOFIBULAR ARTICULATION.    4. MODERATE-SIZED JOINT EFFUSION WITH EXTENSIVE SYNOVITIS.    5. LIKELY INTRAARTICULAR OSSIFIC LOOSE BODY SEEN ADJACENT TO THE PROXIMAL  TIBIA POSTERIORLY.    6. MULTIPLE LOW SIGNAL AREAS SEEN WITHIN A MODERATE-SIZED BAKER CYST  WHICH MAY REPRESENT EITHER SYNOVITIS OR EXTRUDED INTRAARTICULAR LOOSE  BODIES.      All relevant laboratory and imaging results reported on this note were reviewed and interpreted by me.         Assessment & Plan     Angela Winchester is a 79 y.o. female with history and physical as noted above whose presentation merits the following clinical impressions and recommendations:    1. Seropositive rheumatoid arthritis (HCC)  Clinically and serologically much improved following initiation of Rinvoq in place of Xeljanz in addition to sulfasalazine, so no need for escalation of treatment. However, given the potential for smoldering disease activity with limited clinical manifestations, need to routinely reassess markers of disease activity and risk stratification to gauge overall trajectory.  - CRP QUANTITIVE (NON-CARDIAC); Future  - Sed Rate; Future  - Continue Rinvoq 15 mg SR daily   - Continue sulfasalazine 1000 mg twice daily     2. Osteoarthritis involving multiple joints on both sides of body  Significant etiology of her biomechanical arthralgia which  can be managed supportively.  - Tylenol Arthritis and Voltaren 1% gel with or without oral NSAIDs as needed when off of prednisone  - She is not inclined to intra-articular steroid injection due to apparent bad experience in the past     3. Multiple atypical skin moles  Chronic problem that was previously evaluated by dermatology with skin biopsy reportedly unrevealing of any evidence of malignancy.  - Routine follow-up with dermatology     4. Immunosuppressed status (HCC)  High risk immunosuppressant use requiring routine monitoring labs prior to every visit to assess for possible side effects including but not limited to myelotoxicity, hepatotoxicity, and opportunistic infections.  - CBC WITH DIFFERENTIAL; Future  - Comp Metabolic Panel; Future  - Quantiferon Gold Plus TB (4 tube); Future  - Need to ascertain age-appropriate vaccines in addition to the ones listed above under immunizations      The above assessment and plan were discussed with the patient who acknowledged understanding of the plan.    FOLLOW-UP: Return in about 4 months (around 8/16/2025) for Short.         Thank you for the opportunity to participate in the care of Angela Winchester.    Basil Ren MD, MS, FACR  Rheumatologist, [facility] Rheumatology, [facility]   of Clinical Medicine, Department of Internal Medicine  [facility]

## 2025-07-23 ENCOUNTER — HOSPITAL ENCOUNTER (OUTPATIENT)
Dept: LAB | Facility: MEDICAL CENTER | Age: 80
End: 2025-07-23
Attending: STUDENT IN AN ORGANIZED HEALTH CARE EDUCATION/TRAINING PROGRAM
Payer: MEDICARE

## 2025-07-23 DIAGNOSIS — D84.9 IMMUNOSUPPRESSED STATUS (HCC): ICD-10-CM

## 2025-07-23 DIAGNOSIS — M05.9 SEROPOSITIVE RHEUMATOID ARTHRITIS (HCC): ICD-10-CM

## 2025-07-23 LAB
ALBUMIN SERPL BCP-MCNC: 4.1 G/DL (ref 3.2–4.9)
ALBUMIN/GLOB SERPL: 1.4 G/DL
ALP SERPL-CCNC: 74 U/L (ref 30–99)
ALT SERPL-CCNC: 16 U/L (ref 2–50)
ANION GAP SERPL CALC-SCNC: 11 MMOL/L (ref 7–16)
AST SERPL-CCNC: 21 U/L (ref 12–45)
BASOPHILS # BLD AUTO: 0.7 % (ref 0–1.8)
BASOPHILS # BLD: 0.07 K/UL (ref 0–0.12)
BILIRUB SERPL-MCNC: 0.3 MG/DL (ref 0.1–1.5)
BUN SERPL-MCNC: 15 MG/DL (ref 8–22)
CALCIUM ALBUM COR SERPL-MCNC: 9.1 MG/DL (ref 8.5–10.5)
CALCIUM SERPL-MCNC: 9.2 MG/DL (ref 8.5–10.5)
CHLORIDE SERPL-SCNC: 95 MMOL/L (ref 96–112)
CO2 SERPL-SCNC: 24 MMOL/L (ref 20–33)
CREAT SERPL-MCNC: 0.81 MG/DL (ref 0.5–1.4)
CRP SERPL HS-MCNC: 0.95 MG/DL (ref 0–0.75)
EOSINOPHIL # BLD AUTO: 0.08 K/UL (ref 0–0.51)
EOSINOPHIL NFR BLD: 0.8 % (ref 0–6.9)
ERYTHROCYTE [DISTWIDTH] IN BLOOD BY AUTOMATED COUNT: 53.9 FL (ref 35.9–50)
ERYTHROCYTE [SEDIMENTATION RATE] IN BLOOD BY WESTERGREN METHOD: 32 MM/HOUR (ref 0–25)
GFR SERPLBLD CREATININE-BSD FMLA CKD-EPI: 74 ML/MIN/1.73 M 2
GLOBULIN SER CALC-MCNC: 2.9 G/DL (ref 1.9–3.5)
GLUCOSE SERPL-MCNC: 99 MG/DL (ref 65–99)
HCT VFR BLD AUTO: 39.5 % (ref 37–47)
HGB BLD-MCNC: 13.4 G/DL (ref 12–16)
IMM GRANULOCYTES # BLD AUTO: 0.21 K/UL (ref 0–0.11)
IMM GRANULOCYTES NFR BLD AUTO: 2 % (ref 0–0.9)
LYMPHOCYTES # BLD AUTO: 1.54 K/UL (ref 1–4.8)
LYMPHOCYTES NFR BLD: 14.6 % (ref 22–41)
MCH RBC QN AUTO: 33.3 PG (ref 27–33)
MCHC RBC AUTO-ENTMCNC: 33.9 G/DL (ref 32.2–35.5)
MCV RBC AUTO: 98.3 FL (ref 81.4–97.8)
MONOCYTES # BLD AUTO: 1.34 K/UL (ref 0–0.85)
MONOCYTES NFR BLD AUTO: 12.7 % (ref 0–13.4)
NEUTROPHILS # BLD AUTO: 7.32 K/UL (ref 1.82–7.42)
NEUTROPHILS NFR BLD: 69.2 % (ref 44–72)
NRBC # BLD AUTO: 0 K/UL
NRBC BLD-RTO: 0 /100 WBC (ref 0–0.2)
PLATELET # BLD AUTO: 277 K/UL (ref 164–446)
PMV BLD AUTO: 9.1 FL (ref 9–12.9)
POTASSIUM SERPL-SCNC: 4.4 MMOL/L (ref 3.6–5.5)
PROT SERPL-MCNC: 7 G/DL (ref 6–8.2)
RBC # BLD AUTO: 4.02 M/UL (ref 4.2–5.4)
SODIUM SERPL-SCNC: 130 MMOL/L (ref 135–145)
WBC # BLD AUTO: 10.6 K/UL (ref 4.8–10.8)

## 2025-07-23 PROCEDURE — 86140 C-REACTIVE PROTEIN: CPT

## 2025-07-23 PROCEDURE — 85652 RBC SED RATE AUTOMATED: CPT

## 2025-07-23 PROCEDURE — 86480 TB TEST CELL IMMUN MEASURE: CPT

## 2025-07-23 PROCEDURE — 80053 COMPREHEN METABOLIC PANEL: CPT

## 2025-07-23 PROCEDURE — 36415 COLL VENOUS BLD VENIPUNCTURE: CPT

## 2025-07-23 PROCEDURE — 85025 COMPLETE CBC W/AUTO DIFF WBC: CPT

## 2025-07-24 LAB
GAMMA INTERFERON BACKGROUND BLD IA-ACNC: 0.02 IU/ML
M TB IFN-G BLD-IMP: NEGATIVE
M TB IFN-G CD4+ BCKGRND COR BLD-ACNC: 0 IU/ML
MITOGEN IGNF BCKGRD COR BLD-ACNC: 2.51 IU/ML
QFT TB2 - NIL TBQ2: 0 IU/ML

## 2025-08-05 ENCOUNTER — OFFICE VISIT (OUTPATIENT)
Dept: RHEUMATOLOGY | Facility: MEDICAL CENTER | Age: 80
End: 2025-08-05
Attending: STUDENT IN AN ORGANIZED HEALTH CARE EDUCATION/TRAINING PROGRAM
Payer: MEDICARE

## 2025-08-05 VITALS
SYSTOLIC BLOOD PRESSURE: 124 MMHG | HEIGHT: 64 IN | WEIGHT: 140 LBS | BODY MASS INDEX: 23.9 KG/M2 | TEMPERATURE: 98.1 F | OXYGEN SATURATION: 92 % | RESPIRATION RATE: 16 BRPM | HEART RATE: 81 BPM | DIASTOLIC BLOOD PRESSURE: 58 MMHG

## 2025-08-05 DIAGNOSIS — M15.0 PRIMARY OSTEOARTHRITIS INVOLVING MULTIPLE JOINTS: ICD-10-CM

## 2025-08-05 DIAGNOSIS — M05.9 SEROPOSITIVE RHEUMATOID ARTHRITIS (HCC): Primary | ICD-10-CM

## 2025-08-05 DIAGNOSIS — D84.9 IMMUNOSUPPRESSED STATUS (HCC): ICD-10-CM

## 2025-08-05 DIAGNOSIS — D22.9 MULTIPLE ATYPICAL SKIN MOLES: ICD-10-CM

## 2025-08-05 PROCEDURE — 3078F DIAST BP <80 MM HG: CPT | Performed by: STUDENT IN AN ORGANIZED HEALTH CARE EDUCATION/TRAINING PROGRAM

## 2025-08-05 PROCEDURE — 3074F SYST BP LT 130 MM HG: CPT | Performed by: STUDENT IN AN ORGANIZED HEALTH CARE EDUCATION/TRAINING PROGRAM

## 2025-08-05 PROCEDURE — 99213 OFFICE O/P EST LOW 20 MIN: CPT | Performed by: STUDENT IN AN ORGANIZED HEALTH CARE EDUCATION/TRAINING PROGRAM

## 2025-08-05 PROCEDURE — 99214 OFFICE O/P EST MOD 30 MIN: CPT | Performed by: STUDENT IN AN ORGANIZED HEALTH CARE EDUCATION/TRAINING PROGRAM

## 2025-08-05 ASSESSMENT — FIBROSIS 4 INDEX: FIB4 SCORE: 1.46

## (undated) DEVICE — SYRINGE ASEPTO - (50EA/CA

## (undated) DEVICE — TOURNIQUET CUFF 34 X 4 ONE PORT DISP - STERILE (10/BX)

## (undated) DEVICE — NEPTUNE 4 PORT MANIFOLD - (20/PK)

## (undated) DEVICE — TUBING, SPIROMETRY KIT

## (undated) DEVICE — WRAP COBAN SELF-ADHERENT 6 IN X  5YDS STERILE TAN (12/CA)

## (undated) DEVICE — TRAY CATHETER FOLEY URINE METER W/STATLOCK 350ML (10EA/CA)

## (undated) DEVICE — PACK TOTAL KNEE  (1/CA)

## (undated) DEVICE — ELECTRODE DUAL RETURN W/ CORD - (50/PK)

## (undated) DEVICE — TUBING CLEARLINK DUO-VENT - C-FLO (48EA/CA)

## (undated) DEVICE — SUCTION INSTRUMENT YANKAUER BULBOUS TIP W/O VENT (50EA/CA)

## (undated) DEVICE — DRAPE U ORTHOPEDIC - (10/BX)

## (undated) DEVICE — CANISTER SUCTION 3000ML MECHANICAL FILTER AUTO SHUTOFF MEDI-VAC NONSTERILE LF DISP  (40EA/CA)

## (undated) DEVICE — PAD PREP 24 X 48 CUFFED - (100/CA)

## (undated) DEVICE — MASK, LARYNGEAL AIRWAY #4

## (undated) DEVICE — BLADE 90X18X1.27MM SAW SAGITTAL

## (undated) DEVICE — PACK KNEE ARTHROSCOPY SM OR - (2EA/CA)

## (undated) DEVICE — BANDAGE ELASTIC STERILE VELCRO 6 X 5 YDS (25EA/CA)

## (undated) DEVICE — MASK ANESTHESIA ADULT  - (100/CA)

## (undated) DEVICE — GOWN WARMING STANDARD FLEX - (30/CA)

## (undated) DEVICE — PROTECTOR ULNA NERVE - (36PR/CA)

## (undated) DEVICE — DRAPE ARTHROSCOPE (ACL) - (10/CA)

## (undated) DEVICE — GLOVE, LITE (PAIR)

## (undated) DEVICE — DRAPE C-ARM LARGE 41IN X 74 IN - (10/BX 2BX/CA)

## (undated) DEVICE — CHLORAPREP 26 ML APPLICATOR - ORANGE TINT(25/CA)

## (undated) DEVICE — LENS/HOOD FOR SPACESUIT - (32/PK) PEEL AWAY FACE

## (undated) DEVICE — SYS BN CMNT HI VAC KT MXR BWL - (MIX-E-VAC II)  (10EA/CA)

## (undated) DEVICE — TRAY SRGPRP PVP IOD WT PRP - (20/CA)

## (undated) DEVICE — GLOVE BIOGEL PI ULTRATOUCH SZ 7.0 SURGICAL PF LF- POWDER FREE (50/BX 4BX/CA)

## (undated) DEVICE — GLOVE BIOGEL SZ 7.5 SURGICAL PF LTX - (50PR/BX 4BX/CA)

## (undated) DEVICE — GLOVE BIOGEL SZ 8 SURGICAL PF LTX - (50PR/BX 4BX/CA)

## (undated) DEVICE — SUTURE 2-0 VICRYL PLUS CT-1 36 (36PK/BX)"

## (undated) DEVICE — HANDPIECE 10FT INTPLS SCT PLS IRRIGATION HAND CONTROL SET (6/PK)

## (undated) DEVICE — KIT ROOM DECONTAMINATION

## (undated) DEVICE — SODIUM CHL IRRIGATION 0.9% 1000ML (12EA/CA)

## (undated) DEVICE — TUBING PUMP WITH CONNECTOR REDEUCE (1EA)

## (undated) DEVICE — SET EXTENSION WITH 2 PORTS (48EA/CA) ***PART #2C8610 IS A SUBSTITUTE*****

## (undated) DEVICE — PADDING CAST 6 IN STERILE - 6 X 4 YDS (24/CA)

## (undated) DEVICE — HEAD HOLDER JUNIOR/ADULT

## (undated) DEVICE — LEGGINGS IN-VIEW CLEAR POLY - (20PR/CA)

## (undated) DEVICE — GLOVE BIOGEL PI INDICATOR SZ 6.5 SURGICAL PF LF - (50/BX 4BX/CA)

## (undated) DEVICE — WATER IRRIG. STER. 1500 ML - (9/CA)

## (undated) DEVICE — TIP INTPLS HFLO ML ORFC BTRY - (12/CS)  FOR SURGILAV

## (undated) DEVICE — SYRINGE 30 ML LL (56/BX)

## (undated) DEVICE — DRAPE LARGE 3 QUARTER - (20/CA)

## (undated) DEVICE — GLOVE BIOGEL SZ 6.5 SURGICAL PF LTX (50PR/BX 4BX/CA)

## (undated) DEVICE — GLOVE BIOGEL PI INDICATOR SZ 7.5 SURGICAL PF LF -(50/BX 4BX/CA)

## (undated) DEVICE — KIT ANESTHESIA W/CIRCUIT & 3/LT BAG W/FILTER (20EA/CA)

## (undated) DEVICE — GLOVE BIOGEL SZ 7 SURGICAL PF LTX - (50PR/BX 4BX/CA)

## (undated) DEVICE — GLOVE BIOGEL PI ORTHO SZ 6 SURGICAL PF LF (40PR/BX)

## (undated) DEVICE — GLOVE SURGICAL PROTEXIS PI 8.0 LF - (50PR/BX)

## (undated) DEVICE — SUTURE GENERAL

## (undated) DEVICE — STOCKINET TUBULAR 6IN STERILE - 6 X 48YDS (25/CA)

## (undated) DEVICE — SUTURE 1 VICRYL PLUS CTX - 8 X 18 INCH (12/BX)

## (undated) DEVICE — SODIUM CHL. IRRIGATION 0.9% 3000ML (4EA/CA 65CA/PF)

## (undated) DEVICE — BLADE SAGITTAL SAW DUAL CUT 25.0 X 90.0 X 1.27MM (1/EA)

## (undated) DEVICE — KIT EVACUATER 3 SPRING PVC LF 1/8 DRAIN SIZE (10EA/CA)"

## (undated) DEVICE — SENSOR SPO2 NEO LNCS ADHESIVE (20/BX) SEE USER NOTES

## (undated) DEVICE — PAD UNIVERSAL MULTI USE (1/EA)

## (undated) DEVICE — ELECTRODE 850 FOAM ADHESIVE - HYDROGEL RADIOTRNSPRNT (50/PK)